# Patient Record
Sex: FEMALE | Race: WHITE | NOT HISPANIC OR LATINO | Employment: FULL TIME | URBAN - METROPOLITAN AREA
[De-identification: names, ages, dates, MRNs, and addresses within clinical notes are randomized per-mention and may not be internally consistent; named-entity substitution may affect disease eponyms.]

---

## 2017-03-08 ENCOUNTER — OFFICE VISIT (OUTPATIENT)
Dept: FAMILY MEDICINE | Facility: CLINIC | Age: 32
End: 2017-03-08
Payer: COMMERCIAL

## 2017-03-08 VITALS
TEMPERATURE: 97.9 F | BODY MASS INDEX: 23.3 KG/M2 | OXYGEN SATURATION: 97 % | SYSTOLIC BLOOD PRESSURE: 104 MMHG | HEIGHT: 66 IN | WEIGHT: 145 LBS | DIASTOLIC BLOOD PRESSURE: 76 MMHG | HEART RATE: 70 BPM | RESPIRATION RATE: 16 BRPM

## 2017-03-08 DIAGNOSIS — D22.9 ATYPICAL NEVUS: Primary | ICD-10-CM

## 2017-03-08 PROBLEM — Z13.6 CARDIOVASCULAR SCREENING; LDL GOAL LESS THAN 160: Status: ACTIVE | Noted: 2017-03-08

## 2017-03-08 PROCEDURE — 99203 OFFICE O/P NEW LOW 30 MIN: CPT | Performed by: NURSE PRACTITIONER

## 2017-03-08 NOTE — PROGRESS NOTES
SUBJECTIVE:                                                    Kathya Ac is a 31 year old female who presents to clinic today for the following health issues:    Infected Mole      Duration: notice it x 1.5 month    Description (location/character/radiation): a hard dark-black mole exploded with a brown color discharge under left breast.      Accompanying signs and symptoms: none    History (similar episodes/previous evaluation): None    Therapies tried and outcome: None     Had a small pinpoint mole for years, got scratched then infected.  Erupted with purulent drainage 2weeks ago, now chronically irritated.  2 close family members with unknown skin cancer in their 40's.  Would like this removed.      Problem list and histories reviewed & adjusted, as indicated.  Additional history: as documented    There is no problem list on file for this patient.    History reviewed. No pertinent past surgical history.    Social History   Substance Use Topics     Smoking status: Never Smoker     Smokeless tobacco: Not on file     Alcohol use No     Family History   Problem Relation Age of Onset     Family History Negative Mother      Chronic Obstructive Pulmonary Disease Father      Hypertension Father      Alzheimer Disease Maternal Grandmother      Colon Cancer Maternal Grandfather          No current outpatient prescriptions on file.     No Known Allergies    Reviewed and updated as needed this visit by clinical staff  Tobacco  Allergies  Meds  Med Hx  Surg Hx  Fam Hx  Soc Hx      Reviewed and updated as needed this visit by Provider  Tobacco  Allergies  Meds  Problems  Med Hx  Surg Hx  Fam Hx  Soc Hx          ROS:  Constitutional, HEENT, cardiovascular, pulmonary, gi and gu systems are negative, except as otherwise noted.    OBJECTIVE:                                                    /76 (BP Location: Left arm, Patient Position: Chair, Cuff Size: Adult Regular)  Pulse 70  Temp 97.9  F (36.6  C)  "(Oral)  Resp 16  Ht 5' 6.25\" (1.683 m)  Wt 145 lb (65.8 kg)  LMP  (LMP Unknown)  SpO2 97%  Breastfeeding? Yes  BMI 23.23 kg/m2  Body mass index is 23.23 kg/(m^2).  GENERAL: healthy, alert and no distress  RESP: lungs clear to auscultation - no rales, rhonchi or wheezes  CV: regular rate and rhythm, normal S1 S2, no S3 or S4, no murmur, click or rub, no peripheral edema and peripheral pulses strong  MS: no gross musculoskeletal defects noted, no edema  SKIN: 4mm bicolored brown atyp nevus with excoriation on left chest under breast          ASSESSMENT/PLAN:                                                        ICD-10-CM    1. 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    Will schedule procedure for 40 minutes.    See Patient Instructions    JNEIFER Stevens [facility]  "

## 2017-03-08 NOTE — NURSING NOTE
"Chief Complaint   Patient presents with     Mole       Initial /76 (BP Location: Left arm, Patient Position: Chair, Cuff Size: Adult Regular)  Pulse 70  Temp 97.9  F (36.6  C) (Oral)  Resp 16  Ht 5' 6.25\" (1.683 m)  Wt 145 lb (65.8 kg)  LMP  (LMP Unknown)  SpO2 97%  Breastfeeding? Yes  BMI 23.23 kg/m2 Estimated body mass index is 23.23 kg/(m^2) as calculated from the following:    Height as of this encounter: 5' 6.25\" (1.683 m).    Weight as of this encounter: 145 lb (65.8 kg).  Medication Reconciliation: complete     Zafar Parikh MA      "

## 2017-03-08 NOTE — Clinical Note
Please abstract the following data from this visit with this patient into the appropriate field in Epic:  Pap smear done on this date: 06/2016 (approximately), by this group: Samira Smiley, results were Normal.

## 2017-03-08 NOTE — MR AVS SNAPSHOT
"              After Visit Summary   3/8/2017    Kathya Ac    MRN: [id number]           Patient Information     Date Of Birth          1985        Visit Information        Provider Department      3/8/2017 12:00 PM Alondra Pham APRN CNP [facility]        Today's Diagnoses     Atypical nevus    -  1       Follow-ups after your visit        Your next 10 appointments already scheduled     Mar 15, 2017  8:20 AM CDT   Office Visit with JENIFER Stevens CNP   [facility] ([facility])    [address] [place] [id number] [phone]           Bring a current list of meds and any records pertaining to this visit.  For Physicals, please bring immunization records and any forms needing to be filled out.  Please arrive 10 minutes early to complete paperwork.              Who to contact     If you have questions or need follow up information about today's clinic visit or your schedule please contact [facility] directly at [phone].  Normal or non-critical lab and imaging results will be communicated to you by A4 Datahart, letter or phone within 4 business days after the clinic has received the results. If you do not hear from us within 7 days, please contact the clinic through [employer] or phone. If you have a critical or abnormal lab result, we will notify you by phone as soon as possible.  Submit refill requests through Cro[employer]Land or call your pharmacy and they will forward the refill request to us. Please allow 3 business days for your refill to be completed.          Additional Information About Your Visit        A4 DataharZivity Information     CroquetteLand lets you send messages to your doctor, view your test results, renew your prescriptions, schedule appointments and more. To sign up, go to [email or web address] . Click on \"Log in\" on the left side of the screen, which will take you to the Welcome page. Then click on \"Sign " "up Now\" on the right side of the page.     You will be asked to enter the access code listed below, as well as some personal information. Please follow the directions to create your username and password.     Your access code is: [email or web address]  Expires: 2017  1:19 PM     Your access code will  in 90 days. If you need help or a new code, please call your [facility] or [phone].        Care EveryWhere ID     This is your Care EveryWhere ID. This could be used by other organizations to access your [place] medical records  [id number]        Your Vitals Were     Pulse Temperature Respirations Height Last Period Pulse Oximetry    70 97.9  F (36.6  C) (Oral) 16 5' 6.25\" (1.683 m) (LMP Unknown) 97%    Breastfeeding? BMI (Body Mass Index)                Yes 23.23 kg/m2           Blood Pressure from Last 3 Encounters:   17 104/76    Weight from Last 3 Encounters:   17 145 lb (65.8 kg)              Today, you had the following     No orders found for display       Primary Care Provider    None Specified       No primary provider on file.        Thank you!     Thank you for choosing [facility]  for your care. Our goal is always to provide you with excellent care. Hearing back from our patients is one way we can continue to improve our services. Please take a few minutes to complete the written survey that you may receive in the mail after your visit with us. Thank you!             Your Updated Medication List - Protect others around you: Learn how to safely use, store and throw away your medicines at www.disposemymeds.org.      Notice  As of 3/8/2017  1:19 PM    You have not been prescribed any medications.      "

## 2017-03-15 ENCOUNTER — OFFICE VISIT (OUTPATIENT)
Dept: FAMILY MEDICINE | Facility: CLINIC | Age: 32
End: 2017-03-15
Payer: COMMERCIAL

## 2017-03-15 VITALS
WEIGHT: 143.25 LBS | OXYGEN SATURATION: 99 % | DIASTOLIC BLOOD PRESSURE: 72 MMHG | HEART RATE: 63 BPM | BODY MASS INDEX: 23.02 KG/M2 | SYSTOLIC BLOOD PRESSURE: 107 MMHG | TEMPERATURE: 97.7 F | HEIGHT: 66 IN | RESPIRATION RATE: 16 BRPM

## 2017-03-15 DIAGNOSIS — D22.9 ATYPICAL NEVUS: Primary | ICD-10-CM

## 2017-03-15 DIAGNOSIS — L91.8 SKIN TAG: ICD-10-CM

## 2017-03-15 PROCEDURE — 11200 RMVL SKIN TAGS UP TO&INC 15: CPT | Performed by: NURSE PRACTITIONER

## 2017-03-15 PROCEDURE — 11300 SHAVE SKIN LESION 0.5 CM/<: CPT | Mod: 59 | Performed by: NURSE PRACTITIONER

## 2017-03-15 NOTE — MR AVS SNAPSHOT
"              After Visit Summary   3/15/2017    Kathya Ac    MRN: [id number]           Patient Information     Date Of Birth          1985        Visit Information        Provider Department      3/15/2017 8:20 AM Alondra Pham APRN CNP [facility]        Today's Diagnoses     Atypical nevus    -  1    Skin tag           Follow-ups after your visit        Who to contact     If you have questions or need follow up information about today's clinic visit or your schedule please contact [facility] directly at [phone].  Normal or non-critical lab and imaging results will be communicated to you by Mixgarhart, letter or phone within 4 business days after the clinic has received the results. If you do not hear from us within 7 days, please contact the clinic through Mixgarhart or phone. If you have a critical or abnormal lab result, we will notify you by phone as soon as possible.  Submit refill requests through [employer]edia or call your pharmacy and they will forward the refill request to us. Please allow 3 business days for your refill to be completed.          Additional Information About Your Visit        MyChart Information     Geofeedia lets you send messages to your doctor, view your test results, renew your prescriptions, schedule appointments and more. To sign up, go to [email or web address] . Click on \"Log in\" on the left side of the screen, which will take you to the Welcome page. Then click on \"Sign up Now\" on the right side of the page.     You will be asked to enter the access code listed below, as well as some personal information. Please follow the directions to create your username and password.     Your access code is: [email or web address]  Expires: 2017  2:19 PM     Your access code will  in 90 days. If you need help or a new code, please call your [facility] or [phone].        Care EveryWhere ID     This is your Care EveryWhere ID. This could " "be used by other organizations to access your [place] medical records  [id number]        Your Vitals Were     Pulse Temperature Respirations Height Last Period Pulse Oximetry    63 97.7  F (36.5  C) (Oral) 16 5' 6.25\" (1.683 m) (LMP Unknown) 99%    Breastfeeding? BMI (Body Mass Index)                No 22.95 kg/m2           Blood Pressure from Last 3 Encounters:   03/15/17 107/72   03/08/17 104/76    Weight from Last 3 Encounters:   03/15/17 143 lb 4 oz (65 kg)   03/08/17 145 lb (65.8 kg)              We Performed the Following     BIOPSY SKIN/SUBQ/MUC MEM, SINGLE LESION     REMOVAL OF SKIN TAGS, FIRST 15        Primary Care Provider    None Specified       No primary provider on file.        Thank you!     Thank you for choosing [facility]  for your care. Our goal is always to provide you with excellent care. Hearing back from our patients is one way we can continue to improve our services. Please take a few minutes to complete the written survey that you may receive in the mail after your visit with us. Thank you!             Your Updated Medication List - Protect others around you: Learn how to safely use, store and throw away your medicines at www.disposemymeds.org.      Notice  As of 3/15/2017 11:59 PM    You have not been prescribed any medications.      "

## 2017-03-15 NOTE — PROGRESS NOTES
"  SUBJECTIVE:                                                    Kathya Ac is a 31 year old female who presents to clinic today for the following health issues:    1. Mole/Skin tag removal     S:  Kathya Ac is a.age female  who presents for is here for evaluation of nevus on her midline chest over her sternum. It has been present for 1 years and there has been a change in its appearance was pale brown now black.  Irritation has been present.    No surface changes such as bleeding crusting scaling or ulceration present. Pruritis, tenderness or pain have not been present.  No appearance of red white or blue color.    2 skin tags, left axilla and right neck.  Both approx 1 mm at the base.  Flesh toned.      No previous history of malignant skin lesions.  Past history of sun exposure.  Family history is Negative for skin malignancy.    O:  /72 (BP Location: Left arm, Patient Position: Chair, Cuff Size: Adult Regular)  Pulse 63  Temp 97.7  F (36.5  C) (Oral)  Resp 16  Ht 5' 6.25\" (1.683 m)  Wt 143 lb 4 oz (65 kg)  LMP  (LMP Unknown)  SpO2 99%  Breastfeeding? No  BMI 22.95 kg/m2    Medial chest wall Nevus is symmetric approximately 2mm in size, with regular borders and uniform black color.    2 skin tagsleft axilla and right neck, both 1mm at the base flesh toned.      suspicious    Procedure:  After informed consent was obtained from the patient, xylocaine 2% jelly was applied to the nevus on medial chest.  Area was then swabbed with betadine prep and infiltrated with 1% lidocaine with epinephrine. A shave biopsy was used to completely excise the lesion. Specimen was NOT submitted to pathology. Patient tolerated the procedure well. EBL minimal.  SKIN TA Small brown/skin colored excrescences attached by short broad to narrow stalk(s) consistent with benign skin tags    Instructed on skin care and sun avoidance.    Alondra Pham RN, CNP    "

## 2017-03-15 NOTE — NURSING NOTE
"Chief Complaint   Patient presents with     Lesion Removal       Initial /72 (BP Location: Left arm, Patient Position: Chair, Cuff Size: Adult Regular)  Pulse 63  Temp 97.7  F (36.5  C) (Oral)  Resp 16  Ht 5' 6.25\" (1.683 m)  Wt 143 lb 4 oz (65 kg)  LMP  (LMP Unknown)  SpO2 99%  Breastfeeding? No  BMI 22.95 kg/m2 Estimated body mass index is 22.95 kg/(m^2) as calculated from the following:    Height as of this encounter: 5' 6.25\" (1.683 m).    Weight as of this encounter: 143 lb 4 oz (65 kg).  Medication Reconciliation: complete     Zafar Parikh MA      "

## 2017-04-10 ENCOUNTER — TELEPHONE (OUTPATIENT)
Dept: FAMILY MEDICINE | Facility: CLINIC | Age: 32
End: 2017-04-10

## 2017-04-10 NOTE — TELEPHONE ENCOUNTER
Pt called in c/o 5 days of rt sided flank pain that comes and goes. Pain 8/10 when it hits-   Denies hematuria, fever, or chills- but she does get the shakes when the pain hits.  Also c/o epigastric pain that worsens with eating  Taking pepto-simethicone and miralax  Discussed poss gastritis and kidney stone-unsure how related as pt denies use of nsaids  Pt unable to come in today d/t meeting at work at 5  She will go to the [facility] as soon as she is done at work  Hours and address given to [facility]  Call back if worsening- in the meantime- go with clear liquids and try some maalox liquid for tummy. Avoid caffeine, solids or spicy.    Nirmala Guevara RN

## 2017-04-15 ENCOUNTER — OFFICE VISIT (OUTPATIENT)
Dept: URGENT CARE | Facility: URGENT CARE | Age: 32
End: 2017-04-15
Payer: COMMERCIAL

## 2017-04-15 VITALS
OXYGEN SATURATION: 99 % | HEART RATE: 96 BPM | DIASTOLIC BLOOD PRESSURE: 70 MMHG | WEIGHT: 138 LBS | TEMPERATURE: 98.2 F | SYSTOLIC BLOOD PRESSURE: 110 MMHG | BODY MASS INDEX: 22.11 KG/M2

## 2017-04-15 DIAGNOSIS — R10.13 ABDOMINAL PAIN, EPIGASTRIC: ICD-10-CM

## 2017-04-15 DIAGNOSIS — N10 ACUTE PYELONEPHRITIS: ICD-10-CM

## 2017-04-15 DIAGNOSIS — R10.13 DYSPEPSIA: Primary | ICD-10-CM

## 2017-04-15 LAB
ALBUMIN SERPL-MCNC: 4.1 G/DL (ref 3.4–5)
ALBUMIN UR-MCNC: NEGATIVE MG/DL
ALP SERPL-CCNC: 75 U/L (ref 40–150)
ALT SERPL W P-5'-P-CCNC: 15 U/L (ref 0–50)
ANION GAP SERPL CALCULATED.3IONS-SCNC: 10 MMOL/L (ref 3–14)
APPEARANCE UR: CLEAR
AST SERPL W P-5'-P-CCNC: 19 U/L (ref 0–45)
BACTERIA #/AREA URNS HPF: ABNORMAL /HPF
BASOPHILS # BLD AUTO: 0 10E9/L (ref 0–0.2)
BASOPHILS NFR BLD AUTO: 0.2 %
BILIRUB SERPL-MCNC: 0.8 MG/DL (ref 0.2–1.3)
BILIRUB UR QL STRIP: NEGATIVE
BUN SERPL-MCNC: 10 MG/DL (ref 7–30)
CALCIUM SERPL-MCNC: 9.7 MG/DL (ref 8.5–10.1)
CHLORIDE SERPL-SCNC: 100 MMOL/L (ref 94–109)
CO2 SERPL-SCNC: 29 MMOL/L (ref 20–32)
COLOR UR AUTO: YELLOW
CREAT SERPL-MCNC: 1.1 MG/DL (ref 0.52–1.04)
DIFFERENTIAL METHOD BLD: ABNORMAL
EOSINOPHIL # BLD AUTO: 1 10E9/L (ref 0–0.7)
EOSINOPHIL NFR BLD AUTO: 10.3 %
ERYTHROCYTE [DISTWIDTH] IN BLOOD BY AUTOMATED COUNT: 13.2 % (ref 10–15)
GFR SERPL CREATININE-BSD FRML MDRD: 59 ML/MIN/1.7M2
GLUCOSE SERPL-MCNC: 87 MG/DL (ref 70–99)
GLUCOSE UR STRIP-MCNC: NEGATIVE MG/DL
HCT VFR BLD AUTO: 47.6 % (ref 35–47)
HGB BLD-MCNC: 15.8 G/DL (ref 11.7–15.7)
HGB UR QL STRIP: NEGATIVE
KETONES UR STRIP-MCNC: ABNORMAL MG/DL
LEUKOCYTE ESTERASE UR QL STRIP: ABNORMAL
LYMPHOCYTES # BLD AUTO: 2.9 10E9/L (ref 0.8–5.3)
LYMPHOCYTES NFR BLD AUTO: 31.8 %
MCH RBC QN AUTO: 28.4 PG (ref 26.5–33)
MCHC RBC AUTO-ENTMCNC: 33.2 G/DL (ref 31.5–36.5)
MCV RBC AUTO: 86 FL (ref 78–100)
MONOCYTES # BLD AUTO: 0.6 10E9/L (ref 0–1.3)
MONOCYTES NFR BLD AUTO: 6.8 %
NEUTROPHILS # BLD AUTO: 4.7 10E9/L (ref 1.6–8.3)
NEUTROPHILS NFR BLD AUTO: 50.9 %
NITRATE UR QL: NEGATIVE
NON-SQ EPI CELLS #/AREA URNS LPF: ABNORMAL /LPF
PH UR STRIP: 8.5 PH (ref 5–7)
PLATELET # BLD AUTO: 310 10E9/L (ref 150–450)
POTASSIUM SERPL-SCNC: 4.2 MMOL/L (ref 3.4–5.3)
PROT SERPL-MCNC: 7.7 G/DL (ref 6.8–8.8)
RBC # BLD AUTO: 5.56 10E12/L (ref 3.8–5.2)
RBC #/AREA URNS AUTO: ABNORMAL /HPF (ref 0–2)
SODIUM SERPL-SCNC: 139 MMOL/L (ref 133–144)
SP GR UR STRIP: 1.02 (ref 1–1.03)
URN SPEC COLLECT METH UR: ABNORMAL
UROBILINOGEN UR STRIP-ACNC: 0.2 EU/DL (ref 0.2–1)
WBC # BLD AUTO: 9.3 10E9/L (ref 4–11)
WBC #/AREA URNS AUTO: ABNORMAL /HPF (ref 0–2)

## 2017-04-15 PROCEDURE — 81001 URINALYSIS AUTO W/SCOPE: CPT | Performed by: FAMILY MEDICINE

## 2017-04-15 PROCEDURE — 80053 COMPREHEN METABOLIC PANEL: CPT | Performed by: FAMILY MEDICINE

## 2017-04-15 PROCEDURE — 87338 HPYLORI STOOL AG IA: CPT | Performed by: FAMILY MEDICINE

## 2017-04-15 PROCEDURE — 85025 COMPLETE CBC W/AUTO DIFF WBC: CPT | Performed by: FAMILY MEDICINE

## 2017-04-15 PROCEDURE — 99214 OFFICE O/P EST MOD 30 MIN: CPT | Performed by: FAMILY MEDICINE

## 2017-04-15 PROCEDURE — 83690 ASSAY OF LIPASE: CPT | Performed by: FAMILY MEDICINE

## 2017-04-15 PROCEDURE — 36415 COLL VENOUS BLD VENIPUNCTURE: CPT | Performed by: FAMILY MEDICINE

## 2017-04-15 PROCEDURE — 82150 ASSAY OF AMYLASE: CPT | Performed by: FAMILY MEDICINE

## 2017-04-15 RX ORDER — SULFAMETHOXAZOLE/TRIMETHOPRIM 800-160 MG
1 TABLET ORAL 2 TIMES DAILY
Qty: 14 TABLET | Refills: 0 | Status: SHIPPED | OUTPATIENT
Start: 2017-04-15 | End: 2017-04-22

## 2017-04-15 NOTE — NURSING NOTE
"Kathya Ac is a 31 year old female.      Chief Complaint   Patient presents with     Urgent Care     Abdominal Pain     pt is here for abd pain that she has now had for 9 days - she has been taking Maalox and that is helping but is also giving her diarrhea   Pt is BREASTFEEDING and the baby is 7 months old.    Initial /70 (BP Location: Right arm, Patient Position: Chair, Cuff Size: Adult Regular)  Pulse 96  Temp 98.2  F (36.8  C) (Oral)  Wt 138 lb (62.6 kg)  SpO2 99%  BMI 22.11 kg/m2 Estimated body mass index is 22.11 kg/(m^2) as calculated from the following:    Height as of 3/15/17: 5' 6.25\" (1.683 m).    Weight as of this encounter: 138 lb (62.6 kg).  Medication Reconciliation: complete      Questioned patient about current smoking habits.  Pt. has never smoked.      Argenis Mireles CMA      "

## 2017-04-15 NOTE — PROGRESS NOTES
SUBJECTIVE  HPI:  Kathya Ac is a 31 year old female--she is breastfeeding-- who presents with the CC of 7-8 out of 10 sharp, stabbing off and on pain at the epigastric region of abdomen.  There has also been a constant sensation of gnawing at the painful area.     Pain is located in the epigastric area, with radiation to none.  However, patient has had off and on right flank pain for 15 days.  The pain at worst is a level 7-8 on a scale of 1-10.  Pain has been present for 9 days and is about the same.  EXACERBATING FACTORS: eating.    RELIEVING FACTORS: Maalox (partial relief).  ASSOCIATED SX: as listed above.   Patient has been eating bland foods without much relief. .    Past medical history:    No major medical problems.     No current outpatient prescriptions on file.     Social History   Substance Use Topics     Smoking status: Never Smoker     Smokeless tobacco: Not on file     Alcohol use No       ROS:  Review of systems negative except as stated above.    OBJECTIVE:  /70 (BP Location: Right arm, Patient Position: Chair, Cuff Size: Adult Regular)  Pulse 96  Temp 98.2  F (36.8  C) (Oral)  Wt 138 lb (62.6 kg)  SpO2 99%  BMI 22.11 kg/m2  GENERAL APPEARANCE: healthy, alert and no distress.  Patient is sitting comfortably.   EYES: Eyes grossly normal to inspection.  No obvious scleral icterus.   ABDOMEN: soft, normal bowel sounds, tenderness moderate (at the epigastrium), No hepatosplenomegaly.  No rebound/guarding/distension.   SKIN: no suspicious lesions or rashes  BACK;  No costovertebral angle pain with percussion.     LABS:   Results for orders placed or performed in visit on 04/15/17   *UA reflex to Microscopic and Culture ([place] [facility] (except [place] and [place])   Result Value Ref Range    Color Urine Yellow     Appearance Urine Clear     Glucose Urine Negative NEG mg/dL    Bilirubin Urine Negative NEG    Ketones Urine Trace (A) NEG mg/dL    Specific Gravity Urine 1.020  1.003 - 1.035    Blood Urine Negative NEG    pH Urine 8.5 (H) 5.0 - 7.0 pH    Protein Albumin Urine Negative NEG mg/dL    Urobilinogen Urine 0.2 0.2 - 1.0 EU/dL    Nitrite Urine Negative NEG    Leukocyte Esterase Urine Trace (A) NEG    Source Midstream Urine    CBC with platelets and differential   Result Value Ref Range    WBC 9.3 4.0 - 11.0 10e9/L    RBC Count 5.56 (H) 3.8 - 5.2 10e12/L    Hemoglobin 15.8 (H) 11.7 - 15.7 g/dL    Hematocrit 47.6 (H) 35.0 - 47.0 %    MCV 86 78 - 100 fl    MCH 28.4 26.5 - 33.0 pg    MCHC 33.2 31.5 - 36.5 g/dL    RDW 13.2 10.0 - 15.0 %    Platelet Count 310 150 - 450 10e9/L    Diff Method Automated Method     % Neutrophils 50.9 %    % Lymphocytes 31.8 %    % Monocytes 6.8 %    % Eosinophils 10.3 %    % Basophils 0.2 %    Absolute Neutrophil 4.7 1.6 - 8.3 10e9/L    Absolute Lymphocytes 2.9 0.8 - 5.3 10e9/L    Absolute Monocytes 0.6 0.0 - 1.3 10e9/L    Absolute Eosinophils 1.0 (H) 0.0 - 0.7 10e9/L    Absolute Basophils 0.0 0.0 - 0.2 10e9/L   Comprehensive metabolic panel   Result Value Ref Range    Sodium 139 133 - 144 mmol/L    Potassium 4.2 3.4 - 5.3 mmol/L    Chloride 100 94 - 109 mmol/L    Carbon Dioxide 29 20 - 32 mmol/L    Anion Gap 10 3 - 14 mmol/L    Glucose 87 70 - 99 mg/dL    Urea Nitrogen 10 7 - 30 mg/dL    Creatinine 1.10 (H) 0.52 - 1.04 mg/dL    GFR Estimate 59 (L) >60 mL/min/1.7m2    GFR Estimate If Black 71 >60 mL/min/1.7m2    Calcium 9.7 8.5 - 10.1 mg/dL    Bilirubin Total 0.8 0.2 - 1.3 mg/dL    Albumin 4.1 3.4 - 5.0 g/dL    Protein Total 7.7 6.8 - 8.8 g/dL    Alkaline Phosphatase 75 40 - 150 U/L    ALT 15 0 - 50 U/L    AST 19 0 - 45 U/L   Urine Microscopic   Result Value Ref Range    WBC Urine 2-5 (A) 0 - 2 /HPF    RBC Urine O - 2 0 - 2 /HPF    Squamous Epithelial /LPF Urine Few FEW /LPF    Bacteria Urine Few (A) NEG /HPF         ASSESSMENT:  Epigastric Pain  Dyspepsia  Right Flank Pain  Acute Pyelonephritis    PLAN:  Start Prilosec.  Continue eating a gentle  diet  follow up with the primary care provider in 7-10 days.   Rx:  Bactrim DS   See Orders in Epic  Pending labs:  H pylori Stool Antigen, Amylase, Lipase.   Go to the emergency room if there is worsening pain.     Flip Garland MD

## 2017-04-15 NOTE — PATIENT INSTRUCTIONS
follow up with primary care provider in 7-10 days.     Continue the gentle diet    Start Prilosec

## 2017-04-15 NOTE — MR AVS SNAPSHOT
"              After Visit Summary   4/15/2017    Kathya Ac    MRN: [id number]           Patient Information     Date Of Birth          1985        Visit Information        Provider Department      4/15/2017 2:40 PM Flip Garland MD [facility]        Today's Diagnoses     Dyspepsia    -  1    Abdominal pain, epigastric        Acute pyelonephritis          Care Instructions    follow up with primary care provider in 7-10 days.     Continue the gentle diet    Start Prilosec            Follow-ups after your visit        Who to contact     If you have questions or need follow up information about today's clinic visit or your schedule please contact [facility] directly at [phone].  Normal or non-critical lab and imaging results will be communicated to you by MyChart, letter or phone within 4 business days after the clinic has received the results. If you do not hear from us within 7 days, please contact the clinic through [employer] or phone. If you have a critical or abnormal lab result, we will notify you by phone as soon as possible.  Submit refill requests through Inf[employer]ami or call your pharmacy and they will forward the refill request to us. Please allow 3 business days for your refill to be completed.          Additional Information About Your Visit        MyChart Information     Infogami lets you send messages to your doctor, view your test results, renew your prescriptions, schedule appointments and more. To sign up, go to [email or web address] . Click on \"Log in\" on the left side of the screen, which will take you to the Welcome page. Then click on \"Sign up Now\" on the right side of the page.     You will be asked to enter the access code listed below, as well as some personal information. Please follow the directions to create your username and password.     Your access code is: [email or web address]  Expires: 2017  2:19 PM     Your access code will  in 90 days. If you " need help or a new code, please call your [place] clinic or [phone].        Care EveryWhere ID     This is your Care EveryWhere ID. This could be used by other organizations to access your [place] medical records  [id number]        Your Vitals Were     Pulse Temperature Pulse Oximetry BMI (Body Mass Index)          96 98.2  F (36.8  C) (Oral) 99% 22.11 kg/m2         Blood Pressure from Last 3 Encounters:   04/15/17 110/70   03/15/17 107/72   03/08/17 104/76    Weight from Last 3 Encounters:   04/15/17 138 lb (62.6 kg)   03/15/17 143 lb 4 oz (65 kg)   03/08/17 145 lb (65.8 kg)              We Performed the Following     *UA reflex to Microscopic and Culture ([place] and [facility] (except [place] and [place])     Amylase     CBC with platelets and differential     Comprehensive metabolic panel     H Pylori antigen, stool     Lipase     Urine Microscopic          Today's Medication Changes          These changes are accurate as of: 4/15/17  4:34 PM.  If you have any questions, ask your nurse or doctor.               Start taking these medicines.        Dose/Directions    sulfamethoxazole-trimethoprim 800-160 MG per tablet   Commonly known as:  BACTRIM DS/SEPTRA DS   Used for:  Acute pyelonephritis   Started by:  Flip Garland MD        Dose:  1 tablet   Take 1 tablet by mouth 2 times daily for 7 days   Quantity:  14 tablet   Refills:  0            Where to get your medicines      These medications were sent to [facility] [address] AT [facility] [address], [facility] [address]    Hours:  called pharm.  they do accept faxes Phone:  [phone]     sulfamethoxazole-trimethoprim 800-160 MG per tablet                Primary Care Provider    Fvl None       No address on file        Thank you!     Thank you for choosing [facility]  for your care. Our goal is always to provide you with excellent  care. Hearing back from our patients is one way we can continue to improve our services. Please take a few minutes to complete the written survey that you may receive in the mail after your visit with us. Thank you!             Your Updated Medication List - Protect others around you: Learn how to safely use, store and throw away your medicines at www.disposemymeds.org.          This list is accurate as of: 4/15/17  4:34 PM.  Always use your most recent med list.                   Brand Name Dispense Instructions for use    sulfamethoxazole-trimethoprim 800-160 MG per tablet    BACTRIM DS/SEPTRA DS    14 tablet    Take 1 tablet by mouth 2 times daily for 7 days

## 2017-04-16 ENCOUNTER — TELEPHONE (OUTPATIENT)
Dept: URGENT CARE | Facility: URGENT CARE | Age: 32
End: 2017-04-16

## 2017-04-16 LAB
AMYLASE SERPL-CCNC: 57 U/L (ref 30–110)
LIPASE SERPL-CCNC: 181 U/L (ref 73–393)

## 2017-04-16 NOTE — TELEPHONE ENCOUNTER
SUBJECTIVE:  The patient's April 15, 2017, Amylase was normal at 57 U/L (normal values are  U/L).      PLAN:  Please notify patient of this result.  Based on this lab, pancreatitis is less likely to be a cause of upper abdominal pain.      Flip Garland MD

## 2017-04-16 NOTE — TELEPHONE ENCOUNTER
[phone] ([place])   Called the patient and gave her the msg below - she will set up an appointment with a PMD to follow up on these issues.

## 2017-04-17 ENCOUNTER — TELEPHONE (OUTPATIENT)
Dept: FAMILY MEDICINE | Facility: CLINIC | Age: 32
End: 2017-04-17

## 2017-04-17 ENCOUNTER — HOSPITAL ENCOUNTER (EMERGENCY)
Facility: CLINIC | Age: 32
Discharge: HOME | End: 2017-04-17
Attending: EMERGENCY MEDICINE | Admitting: EMERGENCY MEDICINE
Payer: COMMERCIAL

## 2017-04-17 VITALS
BODY MASS INDEX: 22.18 KG/M2 | HEART RATE: 94 BPM | TEMPERATURE: 97.9 F | HEIGHT: 66 IN | OXYGEN SATURATION: 99 % | WEIGHT: 138 LBS | SYSTOLIC BLOOD PRESSURE: 119 MMHG | DIASTOLIC BLOOD PRESSURE: 82 MMHG | RESPIRATION RATE: 18 BRPM

## 2017-04-17 DIAGNOSIS — K29.00 ACUTE GASTRITIS WITHOUT HEMORRHAGE, UNSPECIFIED GASTRITIS TYPE: ICD-10-CM

## 2017-04-17 LAB
ALBUMIN SERPL-MCNC: 4.1 G/DL (ref 3.4–5)
ALBUMIN UR-MCNC: NEGATIVE MG/DL
ALP SERPL-CCNC: 93 U/L (ref 40–150)
ALT SERPL W P-5'-P-CCNC: 12 U/L (ref 0–50)
ANION GAP SERPL CALCULATED.3IONS-SCNC: 8 MMOL/L (ref 3–14)
APPEARANCE UR: CLEAR
AST SERPL W P-5'-P-CCNC: 5 U/L (ref 0–45)
BASOPHILS # BLD AUTO: 0 10E9/L (ref 0–0.2)
BASOPHILS NFR BLD AUTO: 0.3 %
BILIRUB SERPL-MCNC: 0.5 MG/DL (ref 0.2–1.3)
BILIRUB UR QL STRIP: NEGATIVE
BUN SERPL-MCNC: 11 MG/DL (ref 7–30)
CALCIUM SERPL-MCNC: 8.8 MG/DL (ref 8.5–10.1)
CHLORIDE SERPL-SCNC: 105 MMOL/L (ref 94–109)
CO2 SERPL-SCNC: 27 MMOL/L (ref 20–32)
COLOR UR AUTO: YELLOW
CREAT SERPL-MCNC: 0.97 MG/DL (ref 0.52–1.04)
DIFFERENTIAL METHOD BLD: ABNORMAL
EOSINOPHIL # BLD AUTO: 0.6 10E9/L (ref 0–0.7)
EOSINOPHIL NFR BLD AUTO: 7.4 %
ERYTHROCYTE [DISTWIDTH] IN BLOOD BY AUTOMATED COUNT: 13.2 % (ref 10–15)
GFR SERPL CREATININE-BSD FRML MDRD: 68 ML/MIN/1.7M2
GLUCOSE SERPL-MCNC: 74 MG/DL (ref 70–99)
GLUCOSE UR STRIP-MCNC: NEGATIVE MG/DL
H PYLORI AG STL QL IA: NORMAL
HCG UR QL: NEGATIVE
HCT VFR BLD AUTO: 45.1 % (ref 35–47)
HGB BLD-MCNC: 15.2 G/DL (ref 11.7–15.7)
HGB UR QL STRIP: NEGATIVE
IMM GRANULOCYTES # BLD: 0 10E9/L (ref 0–0.4)
IMM GRANULOCYTES NFR BLD: 0.1 %
INTERNAL QC OK POCT: YES
KETONES UR STRIP-MCNC: NEGATIVE MG/DL
LEUKOCYTE ESTERASE UR QL STRIP: NEGATIVE
LIPASE SERPL-CCNC: 105 U/L (ref 73–393)
LYMPHOCYTES # BLD AUTO: 2.9 10E9/L (ref 0.8–5.3)
LYMPHOCYTES NFR BLD AUTO: 39.5 %
MCH RBC QN AUTO: 29 PG (ref 26.5–33)
MCHC RBC AUTO-ENTMCNC: 33.7 G/DL (ref 31.5–36.5)
MCV RBC AUTO: 86 FL (ref 78–100)
MICRO REPORT STATUS: NORMAL
MONOCYTES # BLD AUTO: 0.3 10E9/L (ref 0–1.3)
MONOCYTES NFR BLD AUTO: 3.7 %
MUCOUS THREADS #/AREA URNS LPF: PRESENT /LPF
NEUTROPHILS # BLD AUTO: 3.6 10E9/L (ref 1.6–8.3)
NEUTROPHILS NFR BLD AUTO: 49 %
NITRATE UR QL: NEGATIVE
NRBC # BLD AUTO: 0 10*3/UL
NRBC BLD AUTO-RTO: 0 /100
PH UR STRIP: 7 PH (ref 5–7)
PLATELET # BLD AUTO: 263 10E9/L (ref 150–450)
POTASSIUM SERPL-SCNC: 4.3 MMOL/L (ref 3.4–5.3)
PROT SERPL-MCNC: 7.8 G/DL (ref 6.8–8.8)
RBC # BLD AUTO: 5.25 10E12/L (ref 3.8–5.2)
RBC #/AREA URNS AUTO: 3 /HPF (ref 0–2)
SODIUM SERPL-SCNC: 140 MMOL/L (ref 133–144)
SP GR UR STRIP: 1.02 (ref 1–1.03)
SPECIMEN SOURCE: NORMAL
SQUAMOUS #/AREA URNS AUTO: <1 /HPF (ref 0–1)
URN SPEC COLLECT METH UR: ABNORMAL
UROBILINOGEN UR STRIP-MCNC: NORMAL MG/DL (ref 0–2)
WBC # BLD AUTO: 7.4 10E9/L (ref 4–11)
WBC #/AREA URNS AUTO: <1 /HPF (ref 0–2)

## 2017-04-17 PROCEDURE — 25000128 H RX IP 250 OP 636: Performed by: EMERGENCY MEDICINE

## 2017-04-17 PROCEDURE — 99284 EMERGENCY DEPT VISIT MOD MDM: CPT | Mod: Z6 | Performed by: EMERGENCY MEDICINE

## 2017-04-17 PROCEDURE — 96360 HYDRATION IV INFUSION INIT: CPT | Performed by: EMERGENCY MEDICINE

## 2017-04-17 PROCEDURE — 85025 COMPLETE CBC W/AUTO DIFF WBC: CPT | Performed by: EMERGENCY MEDICINE

## 2017-04-17 PROCEDURE — 83690 ASSAY OF LIPASE: CPT | Performed by: EMERGENCY MEDICINE

## 2017-04-17 PROCEDURE — 25000132 ZZH RX MED GY IP 250 OP 250 PS 637: Performed by: EMERGENCY MEDICINE

## 2017-04-17 PROCEDURE — 81001 URINALYSIS AUTO W/SCOPE: CPT | Performed by: EMERGENCY MEDICINE

## 2017-04-17 PROCEDURE — 25000125 ZZHC RX 250: Performed by: EMERGENCY MEDICINE

## 2017-04-17 PROCEDURE — 80053 COMPREHEN METABOLIC PANEL: CPT | Performed by: EMERGENCY MEDICINE

## 2017-04-17 PROCEDURE — 99283 EMERGENCY DEPT VISIT LOW MDM: CPT | Mod: 25 | Performed by: EMERGENCY MEDICINE

## 2017-04-17 PROCEDURE — 81025 URINE PREGNANCY TEST: CPT | Performed by: EMERGENCY MEDICINE

## 2017-04-17 RX ORDER — SUCRALFATE 1 G/1
1 TABLET ORAL
Qty: 28 TABLET | Refills: 0 | Status: SHIPPED | OUTPATIENT
Start: 2017-04-17 | End: 2017-04-24

## 2017-04-17 RX ORDER — SUCRALFATE ORAL 1 G/10ML
1 SUSPENSION ORAL ONCE
Status: COMPLETED | OUTPATIENT
Start: 2017-04-17 | End: 2017-04-17

## 2017-04-17 RX ADMIN — LIDOCAINE HYDROCHLORIDE 30 ML: 20 SOLUTION ORAL; TOPICAL at 12:32

## 2017-04-17 RX ADMIN — SUCRALFATE 1 G: 1 SUSPENSION ORAL at 14:29

## 2017-04-17 RX ADMIN — SODIUM CHLORIDE 1000 ML: 9 INJECTION, SOLUTION INTRAVENOUS at 13:43

## 2017-04-17 ASSESSMENT — ENCOUNTER SYMPTOMS
ABDOMINAL PAIN: 1
COUGH: 0
BLOOD IN STOOL: 0
FEVER: 0
ANAL BLEEDING: 0
SHORTNESS OF BREATH: 0

## 2017-04-17 NOTE — ED AVS SNAPSHOT
[facility], Emergency Department    [address] [address]    Phone:  [phone]                                       Kathya Ac   MRN: [id number]    Department:  [facility], Emergency Department   Date of Visit:  4/17/2017           Patient Information     Date Of Birth          1985        Your diagnoses for this visit were:     Acute gastritis without hemorrhage, unspecified gastritis type        You were seen by Samy Tran MD.        Discharge Instructions       Please make an appointment to follow up with Your Primary Care Provider in 1-2 weeks for recheck.    Continue your Prilosec and other medications as directed.    Discharge References/Attachments     GASTRITIS (ADULT) (ENGLISH)      24 Hour Appointment Hotline       To make an appointment at any [facility] clinic, call [phone] ([phone]). If you don't have a family doctor or clinic, we will help you find one. [facility] clinics are conveniently located to serve the needs of you and your family.             Review of your medicines      START taking        Dose / Directions Last dose taken    sucralfate 1 GM tablet   Commonly known as:  CARAFATE   Dose:  1 g   Quantity:  28 tablet        Take 1 tablet (1 g) by mouth 4 times daily (before meals and nightly) for 7 days   Refills:  0          Our records show that you are taking the medicines listed below. If these are incorrect, please call your family doctor or clinic.        Dose / Directions Last dose taken    sulfamethoxazole-trimethoprim 800-160 MG per tablet   Commonly known as:  BACTRIM DS/SEPTRA DS   Dose:  1 tablet   Quantity:  14 tablet        Take 1 tablet by mouth 2 times daily for 7 days   Refills:  0                Prescriptions were sent or printed at these locations (1 Prescription)                   [facility] [address] [place] [address] - [address] AT [facility] [address],  "[place] [facility] [address]    Telephone:  [phone]   Fax:  [phone]   Hours:  called pharm.  they do accept faxes                E-Prescribed (1 of 1)         sucralfate (CARAFATE) 1 GM tablet                Procedures and tests performed during your visit     CBC with platelets differential    Comprehensive metabolic panel    Lipase    Peripheral IV: Standard    UA with Microscopic reflex to Culture    hCG qual urine POCT      Orders Needing Specimen Collection     None      Pending Results     No orders found from 4/15/2017 to 2017.            Pending Culture Results     No orders found from 4/15/2017 to 2017.            Thank you for choosing [facility]       Thank you for choosing [facility] for your care. Our goal is always to provide you with excellent care. Hearing back from our patients is one way we can continue to improve our services. Please take a few minutes to complete the written survey that you may receive in the mail after you visit with us. Thank you!        [employer] Information     [employer] lets you send messages to your doctor, view your test results, renew your prescriptions, schedule appointments and more. To sign up, go to [email or web address] . Click on \"Log in\" on the left side of the screen, which will take you to the Welcome page. Then click on \"Sign up Now\" on the right side of the page.     You will be asked to enter the access code listed below, as well as some personal information. Please follow the directions to create your username and password.     Your access code is: [email or web address]  Expires: 2017  2:19 PM     Your access code will  in 90 days. If you need help or a new code, please call your [facility] clinic or [phone].        Care EveryWhere ID     This is your Care EveryWhere ID. This could be used by other organizations to access your [facility] medical records  [id number]        After Visit Summary       This is your record. Keep this with you and " show to your community pharmacist(s) and doctor(s) at your next visit.

## 2017-04-17 NOTE — TELEPHONE ENCOUNTER
"Pt was seen in UC 4/15 and dx'd with acute pyelonephritis..Also started on omeprazole for stomach pain.     \"Pain pretty severe, yesterday I almost went to the ED\"    She has abd pain - can't eat anything - feels \"right underneath stomach\". Maalox not helping. Rates pain at 8-9 on 1-10 scale last night. . Pain now at 4. If eats pain worsens . Has had pain for 10 days.   Also right sided flank pain - rates this pain as 6 on 1-10 scale. Nausea started yesterday. Feels bloated.     She is drinking fluids. Urination is normal but orange in color    Advised to ED for pain with dx of pyelonephritis. She agrees.     Nidia Ac, RN, BSN       "

## 2017-04-17 NOTE — ED NOTES
Pt reports 10+ days intermitent acute/sharp epigastric pain, that is much worse w/ any PO intake. + pt was diagnosed w/ pylonephritis Saturday and started on Bactrim. + continued R flank pain. Denies urinary complaints.

## 2017-04-17 NOTE — ED AVS SNAPSHOT
[facility], [place], Emergency Department    [address] [place] [address]    Phone:  [phone]                                       Kathya Ac   MRN: [id number]    Department:  [facility], Emergency Department   Date of Visit:  4/17/2017           After Visit Summary Signature Page     I have received my discharge instructions, and my questions have been answered. I have discussed any challenges I see with this plan with the nurse or doctor.    ..........................................................................................................................................  Patient/Patient Representative Signature      ..........................................................................................................................................  Patient Representative Print Name and Relationship to Patient    ..................................................               ................................................  Date                                            Time    ..........................................................................................................................................  Reviewed by Signature/Title    ...................................................              ..............................................  Date                                                            Time

## 2017-04-17 NOTE — ED PROVIDER NOTES
History     Chief Complaint   Patient presents with     Abdominal Pain     Pt reports 10+ days intermitent acute/sharp epigastric pain, that is much worse w/ any PO intake. + pt was diagnosed w/ pylonephritis Saturday and started on Bactrim. + continued R flank pain. Denies urinary complaints.      HPI  Kathya Ac is a 31 year old female who presents to the ER with complaints of persistent epigastric and right flank pain. Patient states that her symptoms started approximately 10 days ago and states that she had been taking some Mylanta initially with some relief of her epigastric distress. Patient states, however, that this did not continue to work and she wound up seeing an urgent care center who tested her for a number of different things including H pylori. They finally diagnosed her with a pyelonephritis and placed her on Prilosec. The patient states that she has not been getting better over the last 2 days since starting the medications and presents here to the ER for evaluation. Patient states that she wound up lying in a fetal position for 2 hours because of her epigastric and substernal pain. She denies any coughing or shortness of breath. She denies any melena or bright blood per rectum. Patient denies any fevers.     I have reviewed the Medications, Allergies, Past Medical and Surgical History, and Social History in the Epic system.    History reviewed. No pertinent past medical history.    History reviewed. No pertinent surgical history.    Family History   Problem Relation Age of Onset     Family History Negative Mother      Chronic Obstructive Pulmonary Disease Father      Hypertension Father      Alzheimer Disease Maternal Grandmother      Colon Cancer Maternal Grandfather        Social History   Substance Use Topics     Smoking status: Never Smoker     Smokeless tobacco: Not on file     Alcohol use No     Previous Medications    SULFAMETHOXAZOLE-TRIMETHOPRIM (BACTRIM DS/SEPTRA DS) 800-160 MG PER  "TABLET    Take 1 tablet by mouth 2 times daily for 7 days   Prilosec     No Known Allergies    Review of Systems   Constitutional: Negative for fever.   Respiratory: Negative for cough and shortness of breath.    Gastrointestinal: Positive for abdominal pain (epigastric). Negative for anal bleeding and blood in stool.   All other systems reviewed and are negative.      Physical Exam   BP: 98/77  Pulse: 94  Temp: 97.3  F (36.3  C)  Resp: 16  Height: 167.6 cm (5' 6\")  Weight: 62.6 kg (138 lb)  SpO2: 95 %  Physical Exam   Constitutional: She is oriented to person, place, and time. She appears well-nourished. No distress.   HENT:   Head: Atraumatic.   Eyes: EOM are normal. Pupils are equal, round, and reactive to light.   Neck: Neck supple.   Cardiovascular: Normal heart sounds.    Pulmonary/Chest: Breath sounds normal.   Abdominal: Soft. There is tenderness (epigastrium). There is no rebound and no guarding.   Musculoskeletal: She exhibits no edema or tenderness.   Neurological: She is alert and oriented to person, place, and time. No cranial nerve deficit.   Skin: No rash noted.   Psychiatric: She has a normal mood and affect.       ED Course     ED Course     Procedures        Results for orders placed or performed during the hospital encounter of 04/17/17   CBC with platelets differential   Result Value Ref Range    WBC 7.4 4.0 - 11.0 10e9/L    RBC Count 5.25 (H) 3.8 - 5.2 10e12/L    Hemoglobin 15.2 11.7 - 15.7 g/dL    Hematocrit 45.1 35.0 - 47.0 %    MCV 86 78 - 100 fl    MCH 29.0 26.5 - 33.0 pg    MCHC 33.7 31.5 - 36.5 g/dL    RDW 13.2 10.0 - 15.0 %    Platelet Count 263 150 - 450 10e9/L    Diff Method Automated Method     % Neutrophils 49.0 %    % Lymphocytes 39.5 %    % Monocytes 3.7 %    % Eosinophils 7.4 %    % Basophils 0.3 %    % Immature Granulocytes 0.1 %    Nucleated RBCs 0 0 /100    Absolute Neutrophil 3.6 1.6 - 8.3 10e9/L    Absolute Lymphocytes 2.9 0.8 - 5.3 10e9/L    Absolute Monocytes 0.3 0.0 - 1.3 " 10e9/L    Absolute Eosinophils 0.6 0.0 - 0.7 10e9/L    Absolute Basophils 0.0 0.0 - 0.2 10e9/L    Abs Immature Granulocytes 0.0 0 - 0.4 10e9/L    Absolute Nucleated RBC 0.0    Comprehensive metabolic panel   Result Value Ref Range    Sodium 140 133 - 144 mmol/L    Potassium 4.3 3.4 - 5.3 mmol/L    Chloride 105 94 - 109 mmol/L    Carbon Dioxide 27 20 - 32 mmol/L    Anion Gap 8 3 - 14 mmol/L    Glucose 74 70 - 99 mg/dL    Urea Nitrogen 11 7 - 30 mg/dL    Creatinine 0.97 0.52 - 1.04 mg/dL    GFR Estimate 68 >60 mL/min/1.7m2    GFR Estimate If Black 82 >60 mL/min/1.7m2    Calcium 8.8 8.5 - 10.1 mg/dL    Bilirubin Total 0.5 0.2 - 1.3 mg/dL    Albumin 4.1 3.4 - 5.0 g/dL    Protein Total 7.8 6.8 - 8.8 g/dL    Alkaline Phosphatase 93 40 - 150 U/L    ALT 12 0 - 50 U/L    AST 5 0 - 45 U/L   Lipase   Result Value Ref Range    Lipase 105 73 - 393 U/L   UA with Microscopic reflex to Culture   Result Value Ref Range    Color Urine Yellow     Appearance Urine Clear     Glucose Urine Negative NEG mg/dL    Bilirubin Urine Negative NEG    Ketones Urine Negative NEG mg/dL    Specific Gravity Urine 1.016 1.003 - 1.035    Blood Urine Negative NEG    pH Urine 7.0 5.0 - 7.0 pH    Protein Albumin Urine Negative NEG mg/dL    Urobilinogen mg/dL Normal 0.0 - 2.0 mg/dL    Nitrite Urine Negative NEG    Leukocyte Esterase Urine Negative NEG    Source Catheterized Urine     WBC Urine <1 0 - 2 /HPF    RBC Urine 3 (H) 0 - 2 /HPF    Squamous Epithelial /HPF Urine <1 0 - 1 /HPF    Mucous Urine Present (A) NEG /LPF   hCG qual urine POCT   Result Value Ref Range    HCG Qual Urine Negative neg    Internal QC OK Yes        Labs Ordered and Resulted from Time of ED Arrival Up to the Time of Departure from the ED   CBC WITH PLATELETS DIFFERENTIAL - Abnormal; Notable for the following:        Result Value    RBC Count 5.25 (*)     All other components within normal limits   ROUTINE UA WITH MICROSCOPIC REFLEX TO CULTURE - Abnormal; Notable for the following:      RBC Urine 3 (*)     Mucous Urine Present (*)     All other components within normal limits   HCG QUAL URINE POCT - Normal   COMPREHENSIVE METABOLIC PANEL   LIPASE   PERIPHERAL IV CATHETER       Assessments & Plan (with Medical Decision Making)     I have reviewed the nursing notes.    Medications   sodium chloride (PF) 0.9% PF flush 3 mL (not administered)   sodium chloride (PF) 0.9% PF flush 3 mL (3 mLs Intracatheter Given 4/17/17 1344)   lidocaine (XYLOCAINE) 2 % 15 mL, alum & mag hydroxide-simethicone (MYLANTA ES/MAALOX  ES) 15 mL GI Cocktail (30 mLs Oral Given 4/17/17 1232)   sucralfate (CARAFATE) suspension 1 g (1 g Oral Given 4/17/17 1429)   0.9% sodium chloride BOLUS (0 mLs Intravenous Stopped 4/17/17 1429)       GI cocktail did improve her pain from a 4 to a 2 on a 1-10 scale.    There was a mistake between the lab and the ER and patient's lab results were delayed.  Patient left the ER prior to discharge.  I did call the patient to inform her of her lab results and to escribe a prescription in for her.    I have reviewed the findings, diagnosis, plan and need for follow up with the patient.    New Prescriptions    SUCRALFATE (CARAFATE) 1 GM TABLET    Take 1 tablet (1 g) by mouth 4 times daily (before meals and nightly) for 7 days    E-scribed to her local pharmacy    Final diagnoses:   Acute gastritis without hemorrhage, unspecified gastritis type     Please make an appointment to follow up with Your Primary Care Provider in 1-2 weeks for recheck.    Continue your Prilosec and other medications as directed.      Samy Tran MD    IPamela, am serving as a trained medical scribe to document services personally performed by Samy Tran MD, based on the provider's statements to me.      ISamy MD, was physically present and have reviewed and verified the accuracy of this note documented by Pamela Pickard.       4/17/2017   [facility], EMERGENCY DEPARTMENT      Samy Tran MD  04/17/17 8439

## 2017-04-17 NOTE — DISCHARGE INSTRUCTIONS
Please make an appointment to follow up with Your Primary Care Provider in 1-2 weeks for recheck.    Continue your Prilosec and other medications as directed.

## 2017-04-19 ENCOUNTER — OFFICE VISIT (OUTPATIENT)
Dept: FAMILY MEDICINE | Facility: CLINIC | Age: 32
End: 2017-04-19
Payer: COMMERCIAL

## 2017-04-19 VITALS
OXYGEN SATURATION: 98 % | WEIGHT: 135 LBS | BODY MASS INDEX: 21.79 KG/M2 | TEMPERATURE: 97.9 F | DIASTOLIC BLOOD PRESSURE: 66 MMHG | SYSTOLIC BLOOD PRESSURE: 110 MMHG | HEART RATE: 96 BPM

## 2017-04-19 DIAGNOSIS — K29.00 ACUTE GASTRITIS, PRESENCE OF BLEEDING UNSPECIFIED, UNSPECIFIED GASTRITIS TYPE: Primary | ICD-10-CM

## 2017-04-19 DIAGNOSIS — R10.11 RUQ ABDOMINAL PAIN: ICD-10-CM

## 2017-04-19 PROCEDURE — 99214 OFFICE O/P EST MOD 30 MIN: CPT | Performed by: FAMILY MEDICINE

## 2017-04-19 RX ORDER — DIPHENHYDRAMINE HYDROCHLORIDE AND LIDOCAINE HYDROCHLORIDE AND ALUMINUM HYDROXIDE AND MAGNESIUM HYDRO
5-10 KIT 3 TIMES DAILY PRN
Qty: 237 ML | Refills: 0 | Status: SHIPPED | OUTPATIENT
Start: 2017-04-19 | End: 2018-03-09

## 2017-04-19 RX ORDER — OMEPRAZOLE 40 MG/1
40 CAPSULE, DELAYED RELEASE ORAL
Qty: 60 CAPSULE | Refills: 1 | Status: SHIPPED | OUTPATIENT
Start: 2017-04-19 | End: 2017-04-20

## 2017-04-19 ASSESSMENT — ENCOUNTER SYMPTOMS
SORE THROAT: 0
BLOOD IN STOOL: 0
DIAPHORESIS: 0
FEVER: 0
CHILLS: 0
HEARTBURN: 1
NAUSEA: 1
VOMITING: 1
DYSURIA: 0
FLANK PAIN: 1
HEMATURIA: 0
COUGH: 0
DIARRHEA: 0
CONSTIPATION: 0
ABDOMINAL PAIN: 1
WEIGHT LOSS: 1
FREQUENCY: 0

## 2017-04-19 NOTE — NURSING NOTE
"Chief Complaint   Patient presents with     Abdominal Pain     follow-up       Initial /66 (BP Location: Right arm, Patient Position: Chair, Cuff Size: Adult Regular)  Pulse 96  Temp 97.9  F (36.6  C) (Oral)  Wt 135 lb (61.2 kg)  SpO2 98%  BMI 21.79 kg/m2 Estimated body mass index is 21.79 kg/(m^2) as calculated from the following:    Height as of 4/17/17: 5' 6\" (1.676 m).    Weight as of this encounter: 135 lb (61.2 kg).  Medication Reconciliation: complete     Lucille Parikh MA    "

## 2017-04-19 NOTE — PROGRESS NOTES
HPI  ED/UC Followup:    Facility:  [facility]-ED  Date of visit: 4/17/17  Reason for visit: Abdominal pain  Current Status: Pt still has pain and she has not eaten in the last 4 days. Keeping water down with small sips.  Tried to eat bread last night, vomited back up, felt severe sharp epigastric/right flank pain.  No black or bloody stools, no hematemesis.  She is stooling normally (for her is maybe twice per week).        She was seen in UC 4/15/17, at which point she had had some intermittent right flank pain and epigastric pain for 9 days.  She was advised to start prilosec, follow a bland diet, and was prescribed bactrim for possible pyelonephritis.  She continued to have pain, so she was seen in the ER on 4/17/17; she states that she was given carafate and GI cocktail, which mildly improved her symptoms.  She denies any fevers, chills, dysuria, frequency, hematuria.  She has a constant gnawing epigastric/right upper quadrant pain that becomes much more severe, sharp, and radiates to her right flank when she tries to eat anything.  She was in the fetal position last night after trying some bread, which she then vomited up.  She felt some relief after vomiting.  She has not been able to take the bactrim.  She is taking the prilosec 20mg once daily, but not seeing any improvement.      Review of Systems   Constitutional: Positive for malaise/fatigue and weight loss. Negative for chills, diaphoresis and fever.   HENT: Negative for congestion and sore throat.    Respiratory: Negative for cough.    Cardiovascular: Negative for chest pain.   Gastrointestinal: Positive for abdominal pain, heartburn, nausea and vomiting. Negative for blood in stool, constipation, diarrhea and melena.   Genitourinary: Positive for flank pain. Negative for dysuria, frequency, hematuria and urgency.   Skin: Negative for rash.       No Known Allergies  Current Outpatient Prescriptions   Medication     omeprazole (PRILOSEC) 40 MG capsule      magic mouthwash suspension (diphenhydramine, lidocaine, aluminum-magnesium & simethicone)     sucralfate (CARAFATE) 1 GM tablet     sulfamethoxazole-trimethoprim (BACTRIM DS/SEPTRA DS) 800-160 MG per tablet     No current facility-administered medications for this visit.      Active Ambulatory Problems     Diagnosis Date Noted     CARDIOVASCULAR SCREENING; LDL GOAL LESS THAN 160 03/08/2017     Resolved Ambulatory Problems     Diagnosis Date Noted     No Resolved Ambulatory Problems     No Additional Past Medical History       Physical Exam   Constitutional:   Ill-appearing, but not toxic.   HENT:   Nose: Nose normal.   Mouth/Throat: Oropharynx is clear and moist.   Eyes: Conjunctivae are normal. Pupils are equal, round, and reactive to light. Right eye exhibits no discharge. Left eye exhibits no discharge. No scleral icterus.   Neck: Normal range of motion. Neck supple. No thyromegaly present.   Cardiovascular: Normal rate, regular rhythm and normal heart sounds.  Exam reveals no gallop and no friction rub.    No murmur heard.  Pulmonary/Chest: Effort normal and breath sounds normal. No respiratory distress. She has no wheezes. She has no rales.   Abdominal: Soft. Bowel sounds are normal. She exhibits no distension and no mass. There is tenderness (epigastric and RUQ). There is no rebound and no guarding.   No CVA tenderness     Musculoskeletal: She exhibits no edema.   Lymphadenopathy:     She has no cervical adenopathy.   Neurological: She is alert.   Skin: Skin is warm and dry. No rash noted. She is not diaphoretic.   Vitals reviewed.    /66 (BP Location: Right arm, Patient Position: Chair, Cuff Size: Adult Regular)  Pulse 96  Temp 97.9  F (36.6  C) (Oral)  Wt 135 lb (61.2 kg)  SpO2 98%  BMI 21.79 kg/m2    A/P  Epigastric/RUQ pain: I reviewed her UC and ER notes and lab results; her urine in the UC  Showed no blood, no nitrites, and trace LE; her micro showed 2-5 WBCs, few squamous cells, few bacteria,  no RBCs.  Her creatinine was mildly elevated at 1.10, lipase and amylase were normal, cbc was normal other than mildly elevated h/h (probably due to volume contraction with dehydration), normal LFTs and lytes.  Her ER labs for CBC, CMP, lipase and UA were normal, her pregnancy test was negative.  She also had an h. Pylori stool test that was negative.  I think this is most likely to be severe gastritis vs possible PUD with constant gnawing pain that gets worse after eating, a little less likely to be GBD, less likely to be pancreatitis, and less likely to be pyelonephritis, given her urine samples.  I also considered bowel obstruction, but this seems less likely as well, given that she is passing stool/gas from below and no known risk factors.  Her abdominal exam today showed no rebound or guarding, her vitals are stable, and I do not think this is a surgical abdomen.  I advised it was fine to not take the bactrim.  I am increasing her PPI to 40mg BID; advised carafate 3-4 times daily to try and help her tolerate PO intake.  I will send in some GI cocktail as well to try and help her pain.  I reviewed reasons to go back to the ER, such as dizziness, unrelenting abd pain, fever, black or bloody stools, inability to pass stools, or if unable to tolerate fluids.  I will check an ultrasound to eval for GBD.  I will put in a referral for consultation with GI as well.

## 2017-04-19 NOTE — MR AVS SNAPSHOT
After Visit Summary   4/19/2017    Kathya Ac    MRN: [id number]           Patient Information     Date Of Birth          1985        Visit Information        Provider Department      4/19/2017 12:40 PM Adriana Sales MD [facility]        Today's Diagnoses     Acute gastritis, presence of bleeding unspecified, unspecified gastritis type    -  1    RUQ abdominal pain           Follow-ups after your visit        Additional Services     GASTROENTEROLOGY ADULT REF CONSULT ONLY       Preferred Location: [facility], [facility] [phone] and [facility] [phone]      Please be aware that coverage of these services is subject to the terms and limitations of your health insurance plan.  Call member services at your health plan with any benefit or coverage questions.  Any procedures must be performed at a [facility] facility OR coordinated by your clinic's referral office.    Please bring the following with you to your appointment:    (1) Any X-Rays, CTs or MRIs which have been performed.  Contact the facility where they were done to arrange for  prior to your scheduled appointment.    (2) List of current medications   (3) This referral request   (4) Any documents/labs given to you for this referral                  Who to contact     If you have questions or need follow up information about today's clinic visit or your schedule please contact [facility] directly at [phone].  Normal or non-critical lab and imaging results will be communicated to you by MyChart, letter or phone within 4 business days after the clinic has received the results. If you do not hear from us within 7 days, please contact the clinic through MyChart or phone. If you have a critical or abnormal lab result, we will notify you by phone as soon as possible.  Submit refill requests through [employer] or call your pharmacy and they will forward the refill request to us. Please allow 3  "business days for your refill to be completed.          Additional Information About Your Visit        MyChart Information     [employer] lets you send messages to your doctor, view your test results, renew your prescriptions, schedule appointments and more. To sign up, go to [email or web address] . Click on \"Log in\" on the left side of the screen, which will take you to the Welcome page. Then click on \"Sign up Now\" on the right side of the page.     You will be asked to enter the access code listed below, as well as some personal information. Please follow the directions to create your username and password.     Your access code is: [email or web address]  Expires: 2017  2:19 PM     Your access code will  in 90 days. If you need help or a new code, please call your [place] clinic or [phone].        Care EveryWhere ID     This is your Care EveryWhere ID. This could be used by other organizations to access your [place] medical records  [id number]        Your Vitals Were     Pulse Temperature Pulse Oximetry BMI (Body Mass Index)          96 97.9  F (36.6  C) (Oral) 98% 21.79 kg/m2         Blood Pressure from Last 3 Encounters:   17 110/66   17 119/82   04/15/17 110/70    Weight from Last 3 Encounters:   17 135 lb (61.2 kg)   17 138 lb (62.6 kg)   04/15/17 138 lb (62.6 kg)              We Performed the Following     GASTROENTEROLOGY ADULT REF CONSULT ONLY          Today's Medication Changes          These changes are accurate as of: 17 11:59 PM.  If you have any questions, ask your nurse or doctor.               Start taking these medicines.        Dose/Directions    lidocaine visc 2% & diphenhydramine 12.5mg/5mL & maalox/mylanta w/simethicone (1:1:1 v/v/v) Susp compounding kit   Used for:  Acute gastritis, presence of bleeding unspecified, unspecified gastritis type   Started by:  Adriana Sales MD        Dose:  5-10 mL   Take 5-10 mLs by mouth 3 times daily as needed for " mouth sores   Quantity:  237 mL   Refills:  0       omeprazole 40 MG capsule   Commonly known as:  priLOSEC   Used for:  Acute gastritis, presence of bleeding unspecified, unspecified gastritis type   Started by:  Adriana Sales MD        Dose:  40 mg   Take 1 capsule (40 mg) by mouth 2 times daily (before meals) Take 30-60 minutes before a meal.   Quantity:  60 capsule   Refills:  1         Stop taking these medicines if you haven't already. Please contact your care team if you have questions.     PRILOSEC OTC PO   Stopped by:  Adriana Sales MD                Where to get your medicines      These medications were sent to [facility] - [place], [place] - [address] AT [facility] OF [facility] [address], [facility] [address]    Hours:  called pharm.  they do accept faxes Phone:  [phone]     lidocaine visc 2% & diphenhydramine 12.5mg/5mL & maalox/mylanta w/simethicone (1:1:1 v/v/v) Susp compounding kit    omeprazole 40 MG capsule                Primary Care Provider    Fvl None       No address on file        Thank you!     Thank you for choosing [facility]  for your care. Our goal is always to provide you with excellent care. Hearing back from our patients is one way we can continue to improve our services. Please take a few minutes to complete the written survey that you may receive in the mail after your visit with us. Thank you!             Your Updated Medication List - Protect others around you: Learn how to safely use, store and throw away your medicines at www.disposemymeds.org.          This list is accurate as of: 4/19/17 11:59 PM.  Always use your most recent med list.                   Brand Name Dispense Instructions for use    lidocaine visc 2% & diphenhydramine 12.5mg/5mL & maalox/mylanta w/simethicone (1:1:1 v/v/v) Susp compounding kit     237 mL    Take 5-10 mLs by mouth 3 times daily as needed for mouth  sores       omeprazole 40 MG capsule    priLOSEC    60 capsule    Take 1 capsule (40 mg) by mouth 2 times daily (before meals) Take 30-60 minutes before a meal.       sucralfate 1 GM tablet    CARAFATE    28 tablet    Take 1 tablet (1 g) by mouth 4 times daily (before meals and nightly) for 7 days       sulfamethoxazole-trimethoprim 800-160 MG per tablet    BACTRIM DS/SEPTRA DS    14 tablet    Take 1 tablet by mouth 2 times daily for 7 days

## 2017-04-20 ENCOUNTER — TELEPHONE (OUTPATIENT)
Dept: GENERAL RADIOLOGY | Facility: CLINIC | Age: 32
End: 2017-04-20

## 2017-04-20 DIAGNOSIS — K27.9 PUD (PEPTIC ULCER DISEASE): Primary | ICD-10-CM

## 2017-04-20 DIAGNOSIS — K29.00 ACUTE GASTRITIS, PRESENCE OF BLEEDING UNSPECIFIED, UNSPECIFIED GASTRITIS TYPE: ICD-10-CM

## 2017-04-20 RX ORDER — OMEPRAZOLE 40 MG/1
40 CAPSULE, DELAYED RELEASE ORAL
Qty: 60 CAPSULE | Refills: 1 | Status: SHIPPED | OUTPATIENT
Start: 2017-04-20 | End: 2021-10-08

## 2017-04-20 NOTE — TELEPHONE ENCOUNTER
PA.  Reason is peptic ulcer disease.  Has tried sulcralfate, omeprazole 20mg daily and GI cocktail.  Just a two week course of bid dosing would be ok.

## 2017-04-20 NOTE — TELEPHONE ENCOUNTER
Rx was denied for below medication/s:    Med Prescribed:  OMEPRAZOLE 40 MG  Reason:  PA NEEDED  Recommendations from Insurance:  PA  Insurance Plan:   NOT GIVEN  Patient ID:  [id number]  BIN/RX#:  [id number]  Phone:  [phone]  Fax:       On current med list:  YES    Would you like to change Rx or start PA. If you would like to start PA please include any pertinent information as to why it is needed, other medications taken and reasons why other medication were discontinued.      Please forward to your MA pool.      Thank you,  Taylor Anderson RT (R)(M)

## 2017-04-20 NOTE — TELEPHONE ENCOUNTER
OCTAVIO and asked her ot call clinic back and ask for triage nurse. Message from her provider below.   Taylor Marquis RN

## 2017-04-20 NOTE — TELEPHONE ENCOUNTER
Please call her and check on her symptoms of epigastric abdominal pain.   Let me know if she has had further vomiting; I will likely recommend a CT scan.  She was going to make an appt with GI, please see if she was able to do that.   I will try a different dx code for the omeprazole.

## 2017-04-20 NOTE — TELEPHONE ENCOUNTER
Rx was denied for below medication/s:    Med Prescribed:  OMEPRAZOLE 40 MG  Reason:  PLAN DOES NOT COVER THIS MED AT BID DOSING  Recommendations from Insurance:  PA  Insurance Plan:  NOT GIVEN  Patient ID:  [id number]  BIN/RX#:  [id number]  Phone:  [phone]  Fax:       On current med list:  YES    Would you like to change Rx or start PA. If you would like to start PA please include any pertinent information as to why it is needed, other medications taken and reasons why other medication were discontinued.      Please forward to your MA pool.      Thank you,  Taylor ZAMORA (R)(M)

## 2017-04-21 NOTE — TELEPHONE ENCOUNTER
PA sent to [employer] via CMM. Waiting on approval/denial.    LOJA: [id number]    Deysi Parikh MA

## 2017-04-21 NOTE — TELEPHONE ENCOUNTER
Adriana RODRIGUEZ updates:  Vomiting stopped about 1 1/2 day ago but she will continue to monitor and let you know if it comes back.    Attempted to schedule with [facility] but so far unable. Will try [facility].    Let her know we would try a different DX code for the omeprazole so a PA may not need to be done.  Taylor Marquis RN

## 2017-04-27 NOTE — TELEPHONE ENCOUNTER
Prior authorization was submitting today.  Will hear back within 24 hours.    Lilia Burdick, [facility]

## 2017-04-27 NOTE — TELEPHONE ENCOUNTER
[facility] sent approval for Rx Omeprazole DR 40 mg from 4/27/2017 - 4/27/2018. Will need new PA in 2018 if still needs this med. Put in abstract. Gia Quan CMA

## 2017-04-27 NOTE — TELEPHONE ENCOUNTER
Sandra is the RX provider for this patient, [phone], option 2.  Called Prior Authorization line.  The PA is under review.  Will hear determination via fax within 24 hours.    Lilia Burdick, CMA

## 2017-08-21 ENCOUNTER — NURSE TRIAGE (OUTPATIENT)
Dept: NURSING | Facility: CLINIC | Age: 32
End: 2017-08-21

## 2017-08-21 ENCOUNTER — OFFICE VISIT (OUTPATIENT)
Dept: FAMILY MEDICINE | Facility: CLINIC | Age: 32
End: 2017-08-21
Payer: COMMERCIAL

## 2017-08-21 VITALS
OXYGEN SATURATION: 97 % | RESPIRATION RATE: 18 BRPM | HEART RATE: 79 BPM | WEIGHT: 136.38 LBS | BODY MASS INDEX: 22.01 KG/M2 | TEMPERATURE: 98.4 F | DIASTOLIC BLOOD PRESSURE: 72 MMHG | SYSTOLIC BLOOD PRESSURE: 114 MMHG

## 2017-08-21 DIAGNOSIS — N64.52 NIPPLE DISCHARGE, BLOODY: Primary | ICD-10-CM

## 2017-08-21 DIAGNOSIS — R23.3 EASY BRUISING: ICD-10-CM

## 2017-08-21 DIAGNOSIS — R63.4 WEIGHT LOSS: ICD-10-CM

## 2017-08-21 LAB
ERYTHROCYTE [DISTWIDTH] IN BLOOD BY AUTOMATED COUNT: 12.6 % (ref 10–15)
HCT VFR BLD AUTO: 41.8 % (ref 35–47)
HGB BLD-MCNC: 13.8 G/DL (ref 11.7–15.7)
INR PPP: 0.99 (ref 0.86–1.14)
MCH RBC QN AUTO: 28.6 PG (ref 26.5–33)
MCHC RBC AUTO-ENTMCNC: 33 G/DL (ref 31.5–36.5)
MCV RBC AUTO: 87 FL (ref 78–100)
PLATELET # BLD AUTO: 249 10E9/L (ref 150–450)
RBC # BLD AUTO: 4.82 10E12/L (ref 3.8–5.2)
WBC # BLD AUTO: 9.2 10E9/L (ref 4–11)

## 2017-08-21 PROCEDURE — 36415 COLL VENOUS BLD VENIPUNCTURE: CPT | Performed by: NURSE PRACTITIONER

## 2017-08-21 PROCEDURE — 85027 COMPLETE CBC AUTOMATED: CPT | Performed by: NURSE PRACTITIONER

## 2017-08-21 PROCEDURE — 85610 PROTHROMBIN TIME: CPT | Performed by: NURSE PRACTITIONER

## 2017-08-21 PROCEDURE — 84443 ASSAY THYROID STIM HORMONE: CPT | Performed by: NURSE PRACTITIONER

## 2017-08-21 PROCEDURE — 99214 OFFICE O/P EST MOD 30 MIN: CPT | Performed by: NURSE PRACTITIONER

## 2017-08-21 NOTE — MR AVS SNAPSHOT
After Visit Summary   8/21/2017    Kathya Ac    MRN: [id number]           Patient Information     Date Of Birth          1985        Visit Information        Provider Department      8/21/2017 1:30 PM Michaela Sierra NP [facility]        Today's Diagnoses     Nipple discharge, bloody    -  1    Easy bruising        Weight loss           Follow-ups after your visit        Additional Services     BREAST CENTER REFERRAL       Your provider has referred you to: [facility]: Breast Center at [facility] [phone]   [email or web address]    Please be aware that coverage of these services is subject to the terms and limitations of your health insurance plan.  Call member services at your health plan with any benefit or coverage questions.      Please bring the following with you to your appointment:    (1) Any X-Rays, CTs or MRIs which have been performed.  Contact the facility where they were done to arrange for  prior to your scheduled appointment.    (2) List of current medications   (3) This referral request   (4) Any documents/labs given to you for this referral                  Future tests that were ordered for you today     Open Future Orders        Priority Expected Expires Ordered    US Breast Left Complete 4 Quadrants Routine  8/21/2018 8/21/2017            Who to contact     If you have questions or need follow up information about today's clinic visit or your schedule please contact [facility] directly at [phone].  Normal or non-critical lab and imaging results will be communicated to you by MyChart, letter or phone within 4 business days after the clinic has received the results. If you do not hear from us within 7 days, please contact the clinic through MyChart or phone. If you have a critical or abnormal lab result, we will notify you by phone as soon as  "possible.  Submit refill requests through [employer] or call your pharmacy and they will forward the refill request to us. Please allow 3 business days for your refill to be completed.          Additional Information About Your Visit        [email or web address] Information     [employer] lets you send messages to your doctor, view your test results, renew your prescriptions, schedule appointments and more. To sign up, go to [email or web address] . Click on \"Log in\" on the left side of the screen, which will take you to the Welcome page. Then click on \"Sign up Now\" on the right side of the page.     You will be asked to enter the access code listed below, as well as some personal information. Please follow the directions to create your username and password.     Your access code is: [email or web address]  Expires: 2017  2:17 PM     Your access code will  in 90 days. If you need help or a new code, please call your [place] clinic or [phone].        Care EveryWhere ID     This is your Care EveryWhere ID. This could be used by other organizations to access your [facility] medical records  [id number]        Your Vitals Were     Pulse Temperature Respirations Pulse Oximetry BMI (Body Mass Index)       79 98.4  F (36.9  C) (Oral) 18 97% 22.01 kg/m2        Blood Pressure from Last 3 Encounters:   17 114/72   17 110/66   17 119/82    Weight from Last 3 Encounters:   17 136 lb 6 oz (61.9 kg)   17 135 lb (61.2 kg)   17 138 lb (62.6 kg)              We Performed the Following     BREAST CENTER REFERRAL     CBC with platelets     INR     TSH with free T4 reflex        Primary Care Provider Office Phone # Fax #    Adriana Sales [phone] [address] [place] [address]        Equal Access to Services     THIAGO RILEY : Maycol De Oliveira, waaxda luqadaha, qaybta kaalmateena ellis . So [facility] " [phone].    ATENCIÓN: Si pillo lutz, tiene a allen disposición servicios gratuitos de asistencia lingüística. Destini miguel [phone].    We comply with applicable federal civil rights laws and [place] laws. We do not discriminate on the basis of race, color, national origin, age, disability sex, sexual orientation or gender identity.            Thank you!     Thank you for choosing [facility]  for your care. Our goal is always to provide you with excellent care. Hearing back from our patients is one way we can continue to improve our services. Please take a few minutes to complete the written survey that you may receive in the mail after your visit with us. Thank you!             Your Updated Medication List - Protect others around you: Learn how to safely use, store and throw away your medicines at www.disposemymeds.org.          This list is accurate as of: 8/21/17  2:17 PM.  Always use your most recent med list.                   Brand Name Dispense Instructions for use Diagnosis    lidocaine visc 2% & diphenhydramine 12.5mg/5mL & maalox/mylanta w/simethicone (1:1:1 v/v/v) Susp compounding kit     237 mL    Take 5-10 mLs by mouth 3 times daily as needed for mouth sores    Acute gastritis, presence of bleeding unspecified, unspecified gastritis type       omeprazole 40 MG capsule    priLOSEC    60 capsule    Take 1 capsule (40 mg) by mouth 2 times daily (before meals) Take 30-60 minutes before a meal.    PUD (peptic ulcer disease)

## 2017-08-21 NOTE — NURSING NOTE
"Chief Complaint   Patient presents with     Breast Problem     this morning      Bleeding/Bruising     Bruising on legs x several months        Initial /72  Pulse 79  Temp 98.4  F (36.9  C) (Oral)  Resp 18  Wt 136 lb 6 oz (61.9 kg)  SpO2 97%  BMI 22.01 kg/m2 Estimated body mass index is 22.01 kg/(m^2) as calculated from the following:    Height as of 4/17/17: 5' 6\" (1.676 m).    Weight as of this encounter: 136 lb 6 oz (61.9 kg).  Medication Reconciliation: complete     Diamond Atkins MA      "

## 2017-08-21 NOTE — PROGRESS NOTES
SUBJECTIVE:   Kathya Ac is a 31 year old female who presents to clinic today for the following health issues:      Breast Problem   Leg Problem       Duration: blood this morning and bruising for several months     Description (location/character/radiation): bruising on leg and breast feeding; blood leakage.     Pt went to pump this morning and blood was leaking from breast, bright red and a lot.    Random large bruises on legs without explanation.    No epistaxis, hematuria, melena or hematochezia.  She has always had bleeding gums around a crowned front tooth when she brushes.    She takes a multivitamin.        She pumped late morning today and noticed blood in the breast milk, some stranding clots.  No nipple rashes, cracks, breast pain, lumps.    She has been breast feeding for 11 months.    No family history of breast cancer.              Problem list and histories reviewed & adjusted, as indicated.  Additional history: as documented        Reviewed and updated as needed this visit by clinical staff     Reviewed and updated as needed this visit by Provider         ROS:  C: NEGATIVE for fever, chills, change in weight  E/M: NEGATIVE for ear, mouth and throat problems  R: NEGATIVE for significant cough or SOB  BREAST: see HPI  CV: NEGATIVE for chest pain, palpitations or peripheral edema  GI: NEGATIVE for nausea, abdominal pain, heartburn, or change in bowel habits  MUSCULOSKELETAL: NEGATIVE for significant arthralgias or myalgia  NEURO: NEGATIVE for weakness, dizziness or paresthesias    OBJECTIVE:     /72  Pulse 79  Temp 98.4  F (36.9  C) (Oral)  Resp 18  Wt 136 lb 6 oz (61.9 kg)  SpO2 97%  BMI 22.01 kg/m2  Body mass index is 22.01 kg/(m^2).  GENERAL: healthy, alert and no distress  RESP: lungs clear to auscultation - no rales, rhonchi or wheezes  BREAST: normal without masses, tenderness or nipple discharge and no palpable axillary masses or adenopathy  CV: regular rate and rhythm, normal S1  S2, no S3 or S4, no murmur, click or rub, no peripheral edema and peripheral pulses strong  ABDOMEN: soft, nontender, no hepatosplenomegaly, no masses and bowel sounds normal  MS: no gross musculoskeletal defects noted, no edema  SKIN: few ecchymoses on legs  NEURO: Normal strength and tone, mentation intact and speech normal  PSYCH: mentation appears normal, affect normal/bright    Diagnostic Test Results:  No results found for this or any previous visit (from the past 24 hour(s)).    ASSESSMENT/PLAN:             1. Nipple discharge, bloody  Discussed that this is typically a benign finding in breastfeeding women, but will evaluate further with a breast US and consult at the Breast Center.    - US Breast Left Complete 4 Quadrants; Future  - BREAST CENTER REFERRAL    2. Easy bruising    - CBC with platelets  - INR    3. Weight loss    - TSH with free T4 reflex        Michaela Sierra NP  [facility]

## 2017-08-21 NOTE — TELEPHONE ENCOUNTER
Additional Information    Patient is breastfeeding    Negative: Baby is difficult to awaken or keep awake (Exception: baby needs normal sleep)    Negative: [1] Baby < 1 year old AND [2] very weak (doesn't move or make eye contact)    Negative: Sounds like a life-threatening emergency to the triager    Negative: Baby is formula fed    Negative: Baby spitting up is the main concern    Negative: Baby jaundice up is the main concern    Negative: [1] Baby < 12 weeks old AND [2] fever  > 100.4 F (38.0 C) rectally    Negative: Baby has suspected dehydration (e.g., no urine > 8 hours, brick dust urine 3 or more times, sunken soft spot, very dry mouth) (Exception: no urine > 12 hours on day 2 of life OR > 8 hours on day 3-4 of life and without other signs of dehydration)    Negative: [1] Baby day 2 of life AND [2] no urine > 12 hours AND [3] after given supplemental feeding    Negative: [1] Baby day 3-4 of life AND [2] no urine > 8 hours AND [3] after given supplemental feeding    Negative: [1] Baby < 1 month old ()  AND [2] starts to look or act sick in any way    Negative: [1] Baby < 1 month old AND [2] refuses to breastfeed AND [3] for > 6 hours    Negative: [1] Baby < 12 months AND [2] weak suck / weak muscles AND [3] new onset    Negative: Baby sounds very sick to the triager    Negative: Mother (patient) sounds very sick or weak to the triager    Negative: [1] Baby refuses to drink anything (including supplemental formula or expressed breastmilk) AND [2] for > 8 hours    Negative: Baby's skin and whites of the eyes look deep yellow or orange    Negative: [1] Breast looks infected (e.g., area of redness) AND [2] fever > 100.4 F (38.0 C)    Negative: [1] Baby day 2 of life AND [2] no urine > 12 hours    Negative: [1] Baby day 3 or 4 of life AND [2] no urine > 8 hours    Negative: [1] Baby day 2 of life AND [2] requires supplemental feeding to urinate every 12 hours    Negative: [1] Baby day 2 or 3 of life AND  [2] no stool in 24 hours AND [3] exclusively     Negative: [1] Baby day 3 or 4 of life AND [2] requires supplemental feeding to urinate every 8 hours    Negative: [1] Baby day 4 of life or later AND [2] bowel movements are < 3 per day (should be yellow-colored by day 5) (Exception:  normal infrequent stools after 4 weeks)    Negative: Wet diapers are < 6 per day  (Exception:  can be normal while milk volume is increasing during 1- 4 days of life)    Negative: [1] Baby < 1 month old AND [2] seems hungry after feedings (cries after nursing OR wants to feed over 12 times/day)    Negative: [1] Baby < 1 month old AND [2] needs to be repeatedly awakened for feedings (every 3 hours during the day) BUT [3] alert and feeds well when awakened    Negative: [1] Breast looks infected (e.g., area of redness) AND [2] no fever    Negative: [1] Breast pain AND [2] present > 7 days    Negative: Baby needs a formula or expressed breastmilk supplement during 1st month    Negative: [1] Baby 5 to 30 days old AND [2] doesn't seem to be gaining weight    Negative: [1] Baby > 1 month old AND [2] seems hungry after feedings OR doesn't seem to be gaining weight    Negative: Frequency of feedings to establish adequate milk volume, questions about    Negative: Length of feedings to establish adequate milk volume, questions about    Negative: Signs of an adequate milk volume, questions about    Negative: How to increase milk volume, questions about    Negative: Supplemental formula, questions about    Negative: Extra water, questions about    Negative: Engorgement (generalized swelling and pain) of both breasts, questions about    Negative: Blocked milk ducts (tender lumps in the breast), questions about    Negative: Sore or cracked nipples, questions about    Negative: [1] Spit up blood AND [2] sore, bleeding nipples    Negative: Maternal medications, questions about    Negative: Maternal smoking or tobacco use, questions about     "Negative: Maternal diet, questions about    Negative: Sick infants who are breastfeeding, questions about    Negative: Maternal Illness (mother is sick) and continuing breastfeeding, questions about    Negative: [1] Age > 4 weeks AND [2] normal infrequent stools (all triage questions negative)    Negative: Stools, all other questions about    Negative: Leaking milk from breast, questions about    Negative: Vitamin D and fluoride, questions about    Negative: Storage of pumped breastmilk, questions about    Negative: Overactive letdown that causes pulling away, coughing or choking    Negative: All breastfeeding topics (all Care Advice), questions about    Protocols used: BREAST SYMPTOMS-ADULT-AH, POSTPARTUM - BREASTFEEDING QUESTIONS-ADULT-AH  Patient states she has an 11 month old baby and is breast feeding.  \"This morning while I was pumping my left breast, all that came out was a couple of blood clots and bright red blood.\"  Patient denies any injury to nipple, no inflammation.  Patient also states she has some right leg bruising \"that just appeared\", denies any injury.  Transferred to central scheduling.  "

## 2017-08-22 LAB — TSH SERPL DL<=0.005 MIU/L-ACNC: 2.34 MU/L (ref 0.4–4)

## 2017-09-07 ENCOUNTER — OFFICE VISIT (OUTPATIENT)
Dept: URGENT CARE | Facility: URGENT CARE | Age: 32
End: 2017-09-07
Payer: COMMERCIAL

## 2017-09-07 VITALS
BODY MASS INDEX: 21.79 KG/M2 | TEMPERATURE: 98.1 F | SYSTOLIC BLOOD PRESSURE: 106 MMHG | HEART RATE: 68 BPM | WEIGHT: 135 LBS | OXYGEN SATURATION: 98 % | RESPIRATION RATE: 12 BRPM | DIASTOLIC BLOOD PRESSURE: 72 MMHG

## 2017-09-07 DIAGNOSIS — H92.01 EAR PAIN, RIGHT: Primary | ICD-10-CM

## 2017-09-07 PROCEDURE — 99213 OFFICE O/P EST LOW 20 MIN: CPT | Performed by: PHYSICIAN ASSISTANT

## 2017-09-07 RX ORDER — AMOXICILLIN 875 MG
875 TABLET ORAL 2 TIMES DAILY
Qty: 20 TABLET | Refills: 0 | Status: SHIPPED | OUTPATIENT
Start: 2017-09-07 | End: 2017-09-17

## 2017-09-08 NOTE — PROGRESS NOTES
HPI:  Kathya is a 32 yo female who presents for right ear pain and pressure x 2 weeks.  She reports sx are worsening.  She denies cold sx, runny nose or cough,  No fever.  She tried sudafed for 1 day for relief, but it did not resolve it.      ROS:  See HPI      PE:  Vitals & nursing notes reviewed.  B/P: 106/72, T: 98.1, P: 68, R: 12  Constitutional:  Alert, well nourished, well-developed, NAD  Head:  Atraumatic, normocephalic  Eyes:  Perrla, EOMI, conjunctiva:  pink  Sclera:  Anicteric  Ears:  Canals clear BL, RT TM - Mild erythema with opaque, likely purulent fluid seen  behind TM.  LT TM - Mild erythema    pearly BL  Throat:  No erythema, exudates, or edema to postoropharynx      ASSESSMENT:  1. Right ear pain  Comment:  Minor erythema but there does appear to be some purulent fluid seen behind TM.  Decision to tx.  Likely has some Eustachian tube dysfx as well.  Plan:  Amoxicillin  - see order  Sudafed for ETD.  Tylenol or advil for pain & fever PRN   F/U with  ENT if sx persist or worsen.

## 2017-09-08 NOTE — NURSING NOTE
"Chief Complaint   Patient presents with     Urgent Care     Ear Problem     right ear pain x 2 weeks and it seems to be geting worse.  Feels some pressure in her ear.       Initial /72 (BP Location: Right arm, Patient Position: Sitting, Cuff Size: Adult Regular)  Pulse 68  Temp 98.1  F (36.7  C) (Oral)  Resp 12  Wt 135 lb (61.2 kg)  SpO2 98%  BMI 21.79 kg/m2 Estimated body mass index is 21.79 kg/(m^2) as calculated from the following:    Height as of 4/17/17: 5' 6\" (1.676 m).    Weight as of this encounter: 135 lb (61.2 kg)..  BP completed using cuff size: regular  Deepthi Lawton R.N.    "

## 2017-09-28 ENCOUNTER — RADIANT APPOINTMENT (OUTPATIENT)
Dept: GENERAL RADIOLOGY | Facility: CLINIC | Age: 32
End: 2017-09-28
Attending: FAMILY MEDICINE
Payer: COMMERCIAL

## 2017-09-28 ENCOUNTER — OFFICE VISIT (OUTPATIENT)
Dept: FAMILY MEDICINE | Facility: CLINIC | Age: 32
End: 2017-09-28
Payer: COMMERCIAL

## 2017-09-28 VITALS
RESPIRATION RATE: 18 BRPM | HEART RATE: 73 BPM | OXYGEN SATURATION: 97 % | BODY MASS INDEX: 22.24 KG/M2 | WEIGHT: 137.8 LBS | SYSTOLIC BLOOD PRESSURE: 107 MMHG | DIASTOLIC BLOOD PRESSURE: 73 MMHG | TEMPERATURE: 97.9 F

## 2017-09-28 DIAGNOSIS — L40.9 PSORIASIS: ICD-10-CM

## 2017-09-28 DIAGNOSIS — Z23 NEED FOR PROPHYLACTIC VACCINATION AND INOCULATION AGAINST INFLUENZA: ICD-10-CM

## 2017-09-28 DIAGNOSIS — R07.81 RIB PAIN: ICD-10-CM

## 2017-09-28 DIAGNOSIS — H92.01 RIGHT EAR PAIN: ICD-10-CM

## 2017-09-28 DIAGNOSIS — Z00.00 ROUTINE GENERAL MEDICAL EXAMINATION AT A HEALTH CARE FACILITY: Primary | ICD-10-CM

## 2017-09-28 DIAGNOSIS — M54.6 ACUTE MIDLINE THORACIC BACK PAIN: ICD-10-CM

## 2017-09-28 DIAGNOSIS — G89.29 CHRONIC PAIN OF LEFT KNEE: ICD-10-CM

## 2017-09-28 DIAGNOSIS — M25.562 CHRONIC PAIN OF LEFT KNEE: ICD-10-CM

## 2017-09-28 DIAGNOSIS — R61 NIGHT SWEATS: ICD-10-CM

## 2017-09-28 PROBLEM — N80.9 ENDOMETRIOSIS: Status: ACTIVE | Noted: 2017-09-28

## 2017-09-28 LAB
BASOPHILS # BLD AUTO: 0 10E9/L (ref 0–0.2)
BASOPHILS NFR BLD AUTO: 0.4 %
CHOLEST SERPL-MCNC: 139 MG/DL
DIFFERENTIAL METHOD BLD: NORMAL
EOSINOPHIL # BLD AUTO: 0.2 10E9/L (ref 0–0.7)
EOSINOPHIL NFR BLD AUTO: 3.3 %
ERYTHROCYTE [DISTWIDTH] IN BLOOD BY AUTOMATED COUNT: 12.5 % (ref 10–15)
ERYTHROCYTE [SEDIMENTATION RATE] IN BLOOD BY WESTERGREN METHOD: 4 MM/H (ref 0–20)
GLUCOSE SERPL-MCNC: 72 MG/DL (ref 70–99)
HCT VFR BLD AUTO: 41 % (ref 35–47)
HDLC SERPL-MCNC: 74 MG/DL
HGB BLD-MCNC: 13.4 G/DL (ref 11.7–15.7)
LDLC SERPL CALC-MCNC: 59 MG/DL
LYMPHOCYTES # BLD AUTO: 2.1 10E9/L (ref 0.8–5.3)
LYMPHOCYTES NFR BLD AUTO: 40.6 %
MCH RBC QN AUTO: 28.8 PG (ref 26.5–33)
MCHC RBC AUTO-ENTMCNC: 32.7 G/DL (ref 31.5–36.5)
MCV RBC AUTO: 88 FL (ref 78–100)
MONOCYTES # BLD AUTO: 0.3 10E9/L (ref 0–1.3)
MONOCYTES NFR BLD AUTO: 5 %
NEUTROPHILS # BLD AUTO: 2.7 10E9/L (ref 1.6–8.3)
NEUTROPHILS NFR BLD AUTO: 50.7 %
NONHDLC SERPL-MCNC: 65 MG/DL
PLATELET # BLD AUTO: 245 10E9/L (ref 150–450)
RBC # BLD AUTO: 4.65 10E12/L (ref 3.8–5.2)
TRIGL SERPL-MCNC: 29 MG/DL
WBC # BLD AUTO: 5.2 10E9/L (ref 4–11)

## 2017-09-28 PROCEDURE — 82947 ASSAY GLUCOSE BLOOD QUANT: CPT | Performed by: FAMILY MEDICINE

## 2017-09-28 PROCEDURE — 36415 COLL VENOUS BLD VENIPUNCTURE: CPT | Performed by: FAMILY MEDICINE

## 2017-09-28 PROCEDURE — 71101 X-RAY EXAM UNILAT RIBS/CHEST: CPT | Mod: RT

## 2017-09-28 PROCEDURE — 85025 COMPLETE CBC W/AUTO DIFF WBC: CPT | Performed by: FAMILY MEDICINE

## 2017-09-28 PROCEDURE — 99395 PREV VISIT EST AGE 18-39: CPT | Mod: 25 | Performed by: FAMILY MEDICINE

## 2017-09-28 PROCEDURE — 85652 RBC SED RATE AUTOMATED: CPT | Performed by: FAMILY MEDICINE

## 2017-09-28 PROCEDURE — 72070 X-RAY EXAM THORAC SPINE 2VWS: CPT

## 2017-09-28 PROCEDURE — 80061 LIPID PANEL: CPT | Performed by: FAMILY MEDICINE

## 2017-09-28 PROCEDURE — 99213 OFFICE O/P EST LOW 20 MIN: CPT | Mod: 25 | Performed by: FAMILY MEDICINE

## 2017-09-28 PROCEDURE — 90686 IIV4 VACC NO PRSV 0.5 ML IM: CPT | Performed by: FAMILY MEDICINE

## 2017-09-28 PROCEDURE — 73562 X-RAY EXAM OF KNEE 3: CPT | Mod: 50

## 2017-09-28 PROCEDURE — 90471 IMMUNIZATION ADMIN: CPT | Performed by: FAMILY MEDICINE

## 2017-09-28 NOTE — PROGRESS NOTES
Injectable Influenza Immunization Documentation    1.  Is the person to be vaccinated sick today?   No    2. Does the person to be vaccinated have an allergy to a component   of the vaccine?   No    3. Has the person to be vaccinated ever had a serious reaction   to influenza vaccine in the past?   No    4. Has the person to be vaccinated ever had Guillain-Barré syndrome?   No    Form completed by patient.      Shelia Parikh MA

## 2017-09-28 NOTE — PROGRESS NOTES
"   SUBJECTIVE:   CC: Kathya Ac is an 31 year old woman who presents for preventive health visit.     Physical   Annual:     Getting at least 3 servings of Calcium per day::  NO    Bi-annual eye exam::  Yes    Dental care twice a year::  Yes    Sleep apnea or symptoms of sleep apnea::  None    Diet::  Regular (no restrictions)    Frequency of exercise::  2-3 days/week    Duration of exercise::  15-30 minutes    Taking medications regularly::  Yes    Medication side effects::  None    Additional concerns today::  YES (pain on right rib, and right back pain and right ear plugged )  psoriasis  Endometriosis  migraines    Right ear has been plugged for about one month; she went to a walk in clinic and was advised she had fluid behind her ear drum and was prescribed amoxicillin.  She took the antibiotics and did not have improvement in her symptoms.  Her hearing on that side is muffled; she has a constant discomfort in the ear, and occasional sharp pain elisa when changing elevation.  No discharge or itching.  No sinus symptoms.  No fevers or chills, but she has had a lot of night sweats over the past 6 weeks.   Sudafed has been mildly helpful for temporary relief.      Right rib and mid back pain: she has two very discrete points of pain that started at the same time, in her right lower anterior rib and in the middle of her back over her tspine.  No radiation of the pain.  Not pleuritic.  She can't think of any injury.  It is very tender to press on these spots, but otherwise the pain is constant.      Bilateral knee pain and stiffness L>R: intermittent for many years, the right knee occasionally gets stiff, the left knee gets stiff and also pops and is very painful intermittently.  She has a h/o psoriasis and states her rheumatologist \"Ran a lot of tests that all came back negative.\"  She denies any current rash and is not taking any medication at this time for her psoriasis.  She denies stiffness/arthralgias in other " joints.  She has had x-rays of the knees done in other health systems.     She is an RN.  She has a one year old daughter (who is also seen here) and is breastfeeding.  She has a four year old son with autism.  She is .   In her ROS she also states that she can't seem to keep on any weight; she states her thyroid was recently tested and was normal.      CV: no family h/o early CAD  Malignancy: her father was diagnosed this year with prostate cancer at age 56.  Her MGF had colon cancer.  Her maternal aunt and her MGM have had melanoma.  She denies any moles of concern.  Her pap is up to date.  Bone health: no current risk factors  Immunizations; needs flu shot; tdap done last year with birth of baby  Sexual health:  has had vasectomy.  No symptoms of concern.  Periods are regular.    Depression screen:    Today's PHQ-2 Score:   PHQ-2 ( 1999 Pfizer) 9/28/2017   Q1: Little interest or pleasure in doing things 0   Q2: Feeling down, depressed or hopeless 0   PHQ-2 Score 0   Q1: Little interest or pleasure in doing things Not at all   Q2: Feeling down, depressed or hopeless Not at all   PHQ-2 Score 0       Abuse: Current or Past(Physical, Sexual or Emotional)- No  Do you feel safe in your environment - Yes    Social History   Substance Use Topics     Smoking status: Never Smoker     Smokeless tobacco: Never Used     Alcohol use No     The patient does not drink >3 drinks per day nor >7 drinks per week.    Reviewed orders with patient.  Reviewed health maintenance and updated orders accordingly - Yes  Patient Active Problem List   Diagnosis     CARDIOVASCULAR SCREENING; LDL GOAL LESS THAN 160     History reviewed. No pertinent surgical history.    Social History   Substance Use Topics     Smoking status: Never Smoker     Smokeless tobacco: Never Used     Alcohol use No     Family History   Problem Relation Age of Onset     Family History Negative Mother      colon polyps     Chronic Obstructive Pulmonary  Disease Father      Hypertension Father      Prostate Cancer Father 56     Alzheimer Disease Maternal Grandmother      Melanoma Maternal Grandmother      Colon Cancer Maternal Grandfather      Melanoma Maternal Aunt          Current Outpatient Prescriptions   Medication Sig Dispense Refill     omeprazole (PRILOSEC) 40 MG capsule Take 1 capsule (40 mg) by mouth 2 times daily (before meals) Take 30-60 minutes before a meal. 60 capsule 1     magic mouthwash suspension (diphenhydramine, lidocaine, aluminum-magnesium & simethicone) Take 5-10 mLs by mouth 3 times daily as needed for mouth sores (Patient not taking: Reported on 9/28/2017) 237 mL 0     No Known Allergies      Mammogram not appropriate for this patient based on age.    Pertinent mammograms are reviewed under the imaging tab.  History of abnormal Pap smear: NO - age 21-29 PAP every 3 years recommended    Reviewed and updated as needed this visit by clinical staffTobacco  Allergies  Meds  Med Hx  Surg Hx  Fam Hx  Soc Hx        Reviewed and updated as needed this visit by Provider            ROS:  C: NEGATIVE for fever, chills; patient reporting night sweats x 6 weeks and trouble gaining weight (is breastfeeding)  I: NEGATIVE for worrisome rashes, moles or lesions  E: NEGATIVE for vision changes or irritation  ENT: NEGATIVE for mouth and throat problems; see HPI for ear symptoms  R: NEGATIVE for significant cough or SOB  B: NEGATIVE for masses, tenderness or discharge  CV: NEGATIVE for chest pain, palpitations or peripheral edema  GI: NEGATIVE for nausea, abdominal pain, heartburn, or change in bowel habits  : NEGATIVE for unusual urinary or vaginal symptoms. Periods are regular.  M: see HPI  N: NEGATIVE for weakness, dizziness or paresthesias  P: NEGATIVE for changes in mood or affect     OBJECTIVE:   /73 (BP Location: Right arm, Patient Position: Sitting, Cuff Size: Adult Regular)  Pulse 73  Temp 97.9  F (36.6  C) (Tympanic)  Resp 18  Wt 137  lb 12.8 oz (62.5 kg)  SpO2 97%  BMI 22.24 kg/m2  EXAM:  GENERAL: healthy, alert and no distress  EYES: Eyes grossly normal to inspection, PERRL and conjunctivae and sclerae normal  HENT: ear canals and TM's normal, nose and mouth without ulcers or lesions  NECK: no adenopathy, no asymmetry, masses, or scars and thyroid normal to palpation  RESP: lungs clear to auscultation - no rales, rhonchi or wheezes  BREAST: normal without masses, tenderness or nipple discharge and no palpable axillary masses or adenopathy  CV: regular rate and rhythm, normal S1 S2, no S3 or S4, no murmur, click or rub, no peripheral edema and peripheral pulses strong  ABDOMEN: soft, nontender, no hepatosplenomegaly, no masses and bowel sounds normal  MS: left knee with some tenderness lateral to the patella and over the quad tendon, full ROM, some clicking noted no joint line tenderness, no laxity with anterior/posterior drawer testing, no pain with varus/valgus stress, negative MacMurray.  Right knee exam is normal.  She has point tenderness over her right anterior lower rib and midline t spine no overlying edema, erythema, bruising or other abnormalities; otherwise no gross musculoskeletal defects noted, no edema  SKIN: no suspicious lesions or rashes  NEURO: Normal strength and tone, mentation intact and speech normal  PSYCH: mentation appears normal, affect normal/bright    ASSESSMENT/PLAN:   1. Routine general medical examination at a health care facility  CV: fasting labs per her request, no risk factors  Malignancy: normal pap 2016, repeat 2019.    Bone health: no risk factors  Immunizations: flu shot today  Sexual health: no concerns  Depression screen: neg  - Lipid Profile  - Glucose  - CBC with platelets and differential    2. Right ear pain  Discussed effusion can take several weeks to resolve; can try decongestants/short term afrin.  F/u with ENT if not resolving in the next 1-2 weeks.   - OTOLARYNGOLOGY REFERRAL    3. Rib  "pain  Unclear what this may be, musculoskeletal strain perhaps; with night sweats and weight loss will check plain films for bony lesion  - XR Ribs & Chest Right G/E 3 Views; Future    4. Chronic pain of left knee  Checking plain films, discussed would probably need to see her back or have her f/u with ortho given lack of time to further evaluate today.    5. Acute midline thoracic back pain  Plain films, as above.    6. Night sweats  Could be related to hormones/breastfeeding, reviewed her normal recent thyroid labs, could be related to rheumatologic disease, or anxiety.  Malignancy is considered and we are evaluating her other concerns accordingly.  - CBC with platelets and differential    7. Need for prophylactic vaccination and inoculation against influenza    - FLU VAC, SPLIT VIRUS IM > 3 YO (QUADRIVALENT) [05638]  - Vaccine Administration, Initial [19323]    COUNSELING:  Reviewed preventive health counseling, as reflected in patient instructions       Regular exercise       Healthy diet/nutrition       Colon cancer screening         reports that she has never smoked. She has never used smokeless tobacco.    Estimated body mass index is 22.24 kg/(m^2) as calculated from the following:    Height as of 4/17/17: 5' 6\" (1.676 m).    Weight as of this encounter: 137 lb 12.8 oz (62.5 kg).       Counseling Resources:  ATP IV Guidelines  Pooled Cohorts Equation Calculator  Breast Cancer Risk Calculator  FRAX Risk Assessment  ICSI Preventive Guidelines  Dietary Guidelines for Americans, 2010  USDA's MyPlate  ASA Prophylaxis  Lung CA Screening    Adriana Sales MD  [facility]  Answers for HPI/ROS submitted by the patient on 9/28/2017   PHQ-2 Score: 0    "

## 2017-09-28 NOTE — MR AVS SNAPSHOT
After Visit Summary   9/28/2017    Kathya Ac    MRN: [id number]           Patient Information     Date Of Birth          1985        Visit Information        Provider Department      9/28/2017 9:00 AM Adriana Sales MD [facility]        Today's Diagnoses     Routine general medical examination at a health care facility    -  1    Right ear pain        Rib pain        Chronic pain of left knee        Acute midline thoracic back pain        Night sweats          Care Instructions      Preventive Health Recommendations  Female Ages 26 - 39  Yearly exam:   See your health care provider every year in order to    Review health changes.     Discuss preventive care.      Review your medicines if you your doctor has prescribed any.    Until age 30: Get a Pap test every three years (more often if you have had an abnormal result).    After age 30: Talk to your doctor about whether you should have a Pap test every 3 years or have a Pap test with HPV screening every 5 years.   You do not need a Pap test if your uterus was removed (hysterectomy) and you have not had cancer.  You should be tested each year for STDs (sexually transmitted diseases), if you're at risk.   Talk to your provider about how often to have your cholesterol checked.  If you are at risk for diabetes, you should have a diabetes test (fasting glucose).  Shots: Get a flu shot each year. Get a tetanus shot every 10 years.   Nutrition:     Eat at least 5 servings of fruits and vegetables each day.    Eat whole-grain bread, whole-wheat pasta and brown rice instead of white grains and rice.    Talk to your provider about Calcium and Vitamin D.     Lifestyle    Exercise at least 150 minutes a week (30 minutes a day, 5 days of the week). This will help you control your weight and prevent disease.    Limit alcohol to one drink per day.    No smoking.     Wear sunscreen to prevent skin cancer.    See your dentist every  six months for an exam and cleaning.            Follow-ups after your visit        Additional Services     OTOLARYNGOLOGY REFERRAL       Your provider has referred you to: [facility]: [facility] (Otolaryngology) - [place] [phone]  [email or web address]  [facility]: [facility] Otolaryngology - [place] [phone]   [email or web address]    Please be aware that coverage of these services is subject to the terms and limitations of your health insurance plan.  Call member services at your health plan with any benefit or coverage questions.      Please bring the following with you to your appointment:    (1) Any X-Rays, CTs or MRIs which have been performed.  Contact the facility where they were done to arrange for  prior to your scheduled appointment.   (2) List of current medications  (3) This referral request   (4) Any documents/labs given to you for this referral                  Future tests that were ordered for you today     Open Future Orders        Priority Expected Expires Ordered    XR Ribs & Chest Right G/E 3 Views Routine 9/28/2017 9/28/2018 9/28/2017    XR Thoracic Spine 1 View Routine 9/28/2017 9/28/2018 9/28/2017    XR Knee Bilateral 1/2 Views Routine 9/28/2017 9/28/2018 9/28/2017            Who to contact     If you have questions or need follow up information about today's clinic visit or your schedule please contact [facility] directly at [phone].  Normal or non-critical lab and imaging results will be communicated to you by MyChart, letter or phone within 4 business days after the clinic has received the results. If you do not hear from us within 7 days, please contact the clinic through [facility] or phone. If you have a critical or abnormal lab result, we will notify you by phone as soon as possible.  Submit refill requests through mygola or call your pharmacy and they will forward the refill request to  us. Please allow 3 business days for your refill to be completed.          Additional Information About Your Visit        MyChart Information     [facility] gives you secure access to your electronic health record. If you see a primary care provider, you can also send messages to your care team and make appointments. If you have questions, please call your primary care clinic.  If you do not have a primary care provider, please call [phone] and they will assist you.        Care EveryWhere ID     This is your Care EveryWhere ID. This could be used by other organizations to access your [facility] medical records  [id number]        Your Vitals Were     Pulse Temperature Respirations Pulse Oximetry BMI (Body Mass Index)       73 97.9  F (36.6  C) (Tympanic) 18 97% 22.24 kg/m2        Blood Pressure from Last 3 Encounters:   09/28/17 107/73   09/07/17 106/72   08/21/17 114/72    Weight from Last 3 Encounters:   09/28/17 137 lb 12.8 oz (62.5 kg)   09/07/17 135 lb (61.2 kg)   08/21/17 136 lb 6 oz (61.9 kg)              We Performed the Following     CBC with platelets and differential     Glucose     Lipid Profile     OTOLARYNGOLOGY REFERRAL        Primary Care Provider Office Phone # Fax #    Adriana Sales [phone] [address] [place] [address]        Equal Access to Services     JESUS RILEY : Hadii aad ku hadasho Soomaali, waaxda luqadaha, qaybta kaalmada red, teena bliss . So [facility] [phone].    ATENCIÓN: Si habla español, tiene a allen disposición servicios gratuitos de asistencia lingüística. Destini al [phone].    We comply with applicable federal civil rights laws and [place] laws. We do not discriminate on the basis of race, color, national origin, age, disability sex, sexual orientation or gender identity.            Thank you!     Thank you for choosing [facility]  for your care. Our goal is always to provide  you with excellent care. Hearing back from our patients is one way we can continue to improve our services. Please take a few minutes to complete the written survey that you may receive in the mail after your visit with us. Thank you!             Your Updated Medication List - Protect others around you: Learn how to safely use, store and throw away your medicines at www.disposemymeds.org.          This list is accurate as of: 9/28/17  9:32 AM.  Always use your most recent med list.                   Brand Name Dispense Instructions for use Diagnosis    FIRST-MOUTHWASH BLM MT Susp compounding kit     237 mL    Take 5-10 mLs by mouth 3 times daily as needed for mouth sores    Acute gastritis, presence of bleeding unspecified, unspecified gastritis type       omeprazole 40 MG capsule    priLOSEC    60 capsule    Take 1 capsule (40 mg) by mouth 2 times daily (before meals) Take 30-60 minutes before a meal.    PUD (peptic ulcer disease)

## 2017-09-28 NOTE — NURSING NOTE
"Chief Complaint   Patient presents with     Physical       Initial /73 (BP Location: Right arm, Patient Position: Sitting, Cuff Size: Adult Regular)  Pulse 73  Temp 97.9  F (36.6  C) (Tympanic)  Resp 18  Wt 137 lb 12.8 oz (62.5 kg)  SpO2 97%  BMI 22.24 kg/m2 Estimated body mass index is 22.24 kg/(m^2) as calculated from the following:    Height as of 4/17/17: 5' 6\" (1.676 m).    Weight as of this encounter: 137 lb 12.8 oz (62.5 kg).  Medication Reconciliation: complete         hSelia Parikh MA      "

## 2018-03-09 ENCOUNTER — OFFICE VISIT (OUTPATIENT)
Dept: PEDIATRICS | Facility: CLINIC | Age: 33
End: 2018-03-09
Payer: COMMERCIAL

## 2018-03-09 VITALS
OXYGEN SATURATION: 100 % | HEART RATE: 63 BPM | TEMPERATURE: 98.6 F | BODY MASS INDEX: 25.01 KG/M2 | SYSTOLIC BLOOD PRESSURE: 106 MMHG | DIASTOLIC BLOOD PRESSURE: 62 MMHG | WEIGHT: 155.6 LBS | HEIGHT: 66 IN

## 2018-03-09 DIAGNOSIS — M25.562 ARTHRALGIA OF BOTH KNEES: Primary | ICD-10-CM

## 2018-03-09 DIAGNOSIS — L98.9 HAND LESION: ICD-10-CM

## 2018-03-09 DIAGNOSIS — M25.561 ARTHRALGIA OF BOTH KNEES: Primary | ICD-10-CM

## 2018-03-09 LAB
BASOPHILS # BLD AUTO: 0 10E9/L (ref 0–0.2)
BASOPHILS NFR BLD AUTO: 0.1 %
DIFFERENTIAL METHOD BLD: NORMAL
EOSINOPHIL # BLD AUTO: 0.1 10E9/L (ref 0–0.7)
EOSINOPHIL NFR BLD AUTO: 1.6 %
ERYTHROCYTE [DISTWIDTH] IN BLOOD BY AUTOMATED COUNT: 12.3 % (ref 10–15)
ERYTHROCYTE [SEDIMENTATION RATE] IN BLOOD BY WESTERGREN METHOD: 5 MM/H (ref 0–20)
HCT VFR BLD AUTO: 41.5 % (ref 35–47)
HGB BLD-MCNC: 13.5 G/DL (ref 11.7–15.7)
LYMPHOCYTES # BLD AUTO: 2.8 10E9/L (ref 0.8–5.3)
LYMPHOCYTES NFR BLD AUTO: 41.5 %
MCH RBC QN AUTO: 28.4 PG (ref 26.5–33)
MCHC RBC AUTO-ENTMCNC: 32.5 G/DL (ref 31.5–36.5)
MCV RBC AUTO: 87 FL (ref 78–100)
MONOCYTES # BLD AUTO: 0.4 10E9/L (ref 0–1.3)
MONOCYTES NFR BLD AUTO: 6.5 %
NEUTROPHILS # BLD AUTO: 3.4 10E9/L (ref 1.6–8.3)
NEUTROPHILS NFR BLD AUTO: 50.3 %
PLATELET # BLD AUTO: 243 10E9/L (ref 150–450)
RBC # BLD AUTO: 4.76 10E12/L (ref 3.8–5.2)
WBC # BLD AUTO: 6.7 10E9/L (ref 4–11)

## 2018-03-09 PROCEDURE — 36415 COLL VENOUS BLD VENIPUNCTURE: CPT | Performed by: PHYSICIAN ASSISTANT

## 2018-03-09 PROCEDURE — 85652 RBC SED RATE AUTOMATED: CPT | Performed by: PHYSICIAN ASSISTANT

## 2018-03-09 PROCEDURE — 86060 ANTISTREPTOLYSIN O TITER: CPT | Performed by: PHYSICIAN ASSISTANT

## 2018-03-09 PROCEDURE — 85025 COMPLETE CBC W/AUTO DIFF WBC: CPT | Performed by: PHYSICIAN ASSISTANT

## 2018-03-09 PROCEDURE — 86140 C-REACTIVE PROTEIN: CPT | Performed by: PHYSICIAN ASSISTANT

## 2018-03-09 PROCEDURE — 80053 COMPREHEN METABOLIC PANEL: CPT | Performed by: PHYSICIAN ASSISTANT

## 2018-03-09 PROCEDURE — 99214 OFFICE O/P EST MOD 30 MIN: CPT | Performed by: PHYSICIAN ASSISTANT

## 2018-03-09 RX ORDER — TRAMADOL HYDROCHLORIDE 50 MG/1
50 TABLET ORAL EVERY 6 HOURS PRN
Qty: 15 TABLET | Refills: 0 | Status: SHIPPED | OUTPATIENT
Start: 2018-03-09 | End: 2019-05-22

## 2018-03-09 NOTE — MR AVS SNAPSHOT
After Visit Summary   3/9/2018    Kathya Ac    MRN: [id number]           Patient Information     Date Of Birth          1985        Visit Information        Provider Department      3/9/2018 1:30 PM Andrade Morrison PA-C [facility]        Today's Diagnoses     Arthralgia of both knees    -  1    Hand lesion           Follow-ups after your visit        Additional Services     RHEUMATOLOGY REFERRAL       Your provider has referred you to: [facility]:  [facility]   [phone] [email or web address]    Please be aware that coverage of these services is subject to the terms and limitations of your health insurance plan.  Call member services at your health plan with any benefit or coverage questions.      Please bring the following with you to your appointment:    (1) Any X-Rays, CTs or MRIs which have been performed.  Contact the facility where they were done to arrange for  prior to your scheduled appointment.    (2) List of current medications   (3) This referral request   (4) Any documents/labs given to you for this referral                  Your next 10 appointments already scheduled     Apr 30, 2018  8:30 AM CDT   New Visit with Ar Whitney MD   [facility] ([facility])    [address] [address] [phone]              Who to contact     If you have questions or need follow up information about today's clinic visit or your schedule please contact [facility] directly at [phone].  Normal or non-critical lab and imaging results will be communicated to you by MyChart, letter or phone within 4 business days after the clinic has received the results. If you do not hear from us within 7 days, please contact the clinic through MyChart or phone. If you have a critical or abnormal lab result, we will notify you by phone as soon as possible.  Submit refill  "requests through [employer] or call your pharmacy and they will forward the refill request to us. Please allow 3 business days for your refill to be completed.          Additional Information About Your Visit        Cura TV[employer] Information     [employer] gives you secure access to your electronic health record. If you see a primary care provider, you can also send messages to your care team and make appointments. If you have questions, please call your primary care clinic.  If you do not have a primary care provider, please call [phone] and they will assist you.        Care EveryWhere ID     This is your Care EveryWhere ID. This could be used by other organizations to access your [place] medical records  [id number]        Your Vitals Were     Pulse Temperature Height Pulse Oximetry BMI (Body Mass Index)       63 98.6  F (37  C) (Oral) 5' 6\" (1.676 m) 100% 25.11 kg/m2        Blood Pressure from Last 3 Encounters:   03/09/18 106/62   09/28/17 107/73   09/07/17 106/72    Weight from Last 3 Encounters:   03/09/18 155 lb 9.6 oz (70.6 kg)   09/28/17 137 lb 12.8 oz (62.5 kg)   09/07/17 135 lb (61.2 kg)              We Performed the Following     Antistreptolysin O     CBC with platelets differential     Comprehensive metabolic panel     CRP inflammation     Erythrocyte sedimentation rate auto     RHEUMATOLOGY REFERRAL          Today's Medication Changes          These changes are accurate as of 3/9/18  4:12 PM.  If you have any questions, ask your nurse or doctor.               Start taking these medicines.        Dose/Directions    traMADol 50 MG tablet   Commonly known as:  ULTRAM   Used for:  Arthralgia of both knees   Started by:  Andrade Morrison PA-C        Dose:  50 mg   Take 1 tablet (50 mg) by mouth every 6 hours as needed for moderate pain   Quantity:  15 tablet   Refills:  0            Where to get your medicines      Some of these will need a paper prescription and others can be bought over the " counter.  Ask your nurse if you have questions.     Bring a paper prescription for each of these medications     traMADol 50 MG tablet                Primary Care Provider Office Phone # Fax #    Adriana Sales -[phone] [address]  [place] [address]        Equal Access to Services     THIAGO RILEY : Hadii aad ku hadasho Soomaali, waaxda luqadaha, qaybta kaalmada adeegyada, waxeren leivaezequielsilvino nunez. So [facility] [phone].    ATENCIÓN: Si habla español, tiene a allen disposición servicios gratuitos de asistencia lingüística. [facility] [phone].    We comply with applicable federal civil rights laws and [place] laws. We do not discriminate on the basis of race, color, national origin, age, disability, sex, sexual orientation, or gender identity.            Thank you!     Thank you for choosing [facility]  for your care. Our goal is always to provide you with excellent care. Hearing back from our patients is one way we can continue to improve our services. Please take a few minutes to complete the written survey that you may receive in the mail after your visit with us. Thank you!             Your Updated Medication List - Protect others around you: Learn how to safely use, store and throw away your medicines at www.disposemymeds.org.          This list is accurate as of 3/9/18  4:12 PM.  Always use your most recent med list.                   Brand Name Dispense Instructions for use Diagnosis    omeprazole 40 MG capsule    priLOSEC    60 capsule    Take 1 capsule (40 mg) by mouth 2 times daily (before meals) Take 30-60 minutes before a meal.    PUD (peptic ulcer disease)       traMADol 50 MG tablet    ULTRAM    15 tablet    Take 1 tablet (50 mg) by mouth every 6 hours as needed for moderate pain    Arthralgia of both knees

## 2018-03-09 NOTE — PROGRESS NOTES
"  SUBJECTIVE:   Kathya Ac is a 32 year old female who presents to clinic today for the following health issues:    Joint Pain    Onset: on/off several years    Description:   Location: bilateral knees  Character: Sharp and Dull ache    Intensity: moderate    Progression of Symptoms: worse    Accompanying Signs & Symptoms:  Other symptoms: blisters on hands/painful and occurs with exacerbation of knee pain    History:   Previous similar pain: YES--episodes occurs at least a few times yearly    Precipitating factors:   Trauma or overuse: no     Alleviating factors:  Improved by: nothing  Therapies Tried and outcome: none    Patient seen and evaluated by Rheum in [place] approximately one year ago.   Entire panel of labs completed and xrays of knees NEGATIVE.     History of psoriasis.   History of gastric ulcers due to ibuprofen use.  PMD.     ROS:  CONSTITUTIONAL: NEGATIVE for fever, chills, recent illnesses  ENT/MOUTH: NEGATIVE for ear, mouth and throat problems  RESP: NEGATIVE for significant cough or SOB  CV: NEGATIVE for chest pain, palpitations  GI: NEGATIVE for nausea, abdominal pain  MUSCULOSKELETAL: NEGATIVE for significant arthralgias or myalgia  NEURO: NEGATIVE for weakness, dizziness or paresthesias    OBJECTIVE:                                                    /62 (BP Location: Right arm, Cuff Size: Adult Regular)  Pulse 63  Temp 98.6  F (37  C) (Oral)  Ht 5' 6\" (1.676 m)  Wt 155 lb 9.6 oz (70.6 kg)  SpO2 100%  BMI 25.11 kg/m2  Body mass index is 25.11 kg/(m^2).   GENERAL: alert, no distress  Eyes:  Eyes grossly normal to inspection, PERRL and conjunctivae and sclerae normal  HENT: Mouth- no ulcers, no lesions  NECK: no tenderness, no adenopathy  RESP: lungs clear to auscultation - no rales, no rhonchi, no wheezes  CV: regular rates and rhythm, normal S1 S2, no S3 or S4 and no murmur, no click or rub  ABDOMEN: soft, no tenderness  SKIN:inspection of the right palmar aspect of hand reveals " multiple raised skin colored lesions     Diagnostic test results:  Results for orders placed or performed in visit on 03/09/18 (from the past 24 hour(s))   CBC with platelets differential   Result Value Ref Range    WBC 6.7 4.0 - 11.0 10e9/L    RBC Count 4.76 3.8 - 5.2 10e12/L    Hemoglobin 13.5 11.7 - 15.7 g/dL    Hematocrit 41.5 35.0 - 47.0 %    MCV 87 78 - 100 fl    MCH 28.4 26.5 - 33.0 pg    MCHC 32.5 31.5 - 36.5 g/dL    RDW 12.3 10.0 - 15.0 %    Platelet Count 243 150 - 450 10e9/L    Diff Method Automated Method     % Neutrophils 50.3 %    % Lymphocytes 41.5 %    % Monocytes 6.5 %    % Eosinophils 1.6 %    % Basophils 0.1 %    Absolute Neutrophil 3.4 1.6 - 8.3 10e9/L    Absolute Lymphocytes 2.8 0.8 - 5.3 10e9/L    Absolute Monocytes 0.4 0.0 - 1.3 10e9/L    Absolute Eosinophils 0.1 0.0 - 0.7 10e9/L    Absolute Basophils 0.0 0.0 - 0.2 10e9/L   Erythrocyte sedimentation rate auto   Result Value Ref Range    Sed Rate 5 0 - 20 mm/h        ASSESSMENT/PLAN:                                                    (M25.561,  M25.562) Arthralgia of both knees  (primary encounter diagnosis)  Comment: proceed with labs and rheum consultation. Patient given small quantity of pain medication to help relieve symptoms.   Plan: Comprehensive metabolic panel, CBC with         platelets differential, CRP inflammation,         Erythrocyte sedimentation rate auto,         Antistreptolysin O, traMADol (ULTRAM) 50 MG         tablet, RHEUMATOLOGY REFERRAL            (L98.9) Hand lesion  Comment:   Plan:     See Patient Instructions    Andrade Morrison PA-C  [facility]

## 2018-03-10 LAB
ALBUMIN SERPL-MCNC: 4 G/DL (ref 3.4–5)
ALP SERPL-CCNC: 48 U/L (ref 40–150)
ALT SERPL W P-5'-P-CCNC: 13 U/L (ref 0–50)
ANION GAP SERPL CALCULATED.3IONS-SCNC: 6 MMOL/L (ref 3–14)
AST SERPL W P-5'-P-CCNC: 14 U/L (ref 0–45)
BILIRUB SERPL-MCNC: 0.4 MG/DL (ref 0.2–1.3)
BUN SERPL-MCNC: 7 MG/DL (ref 7–30)
CALCIUM SERPL-MCNC: 8.4 MG/DL (ref 8.5–10.1)
CHLORIDE SERPL-SCNC: 108 MMOL/L (ref 94–109)
CO2 SERPL-SCNC: 28 MMOL/L (ref 20–32)
CREAT SERPL-MCNC: 0.81 MG/DL (ref 0.52–1.04)
CRP SERPL-MCNC: <2.9 MG/L (ref 0–8)
GFR SERPL CREATININE-BSD FRML MDRD: 82 ML/MIN/1.7M2
GLUCOSE SERPL-MCNC: 68 MG/DL (ref 70–99)
POTASSIUM SERPL-SCNC: 3.8 MMOL/L (ref 3.4–5.3)
PROT SERPL-MCNC: 7.1 G/DL (ref 6.8–8.8)
SODIUM SERPL-SCNC: 142 MMOL/L (ref 133–144)

## 2018-03-12 LAB — ASO AB SERPL-ACNC: <25 IU/ML (ref 0–120)

## 2018-09-11 ENCOUNTER — NURSE TRIAGE (OUTPATIENT)
Dept: NURSING | Facility: CLINIC | Age: 33
End: 2018-09-11

## 2018-09-11 ENCOUNTER — OFFICE VISIT (OUTPATIENT)
Dept: FAMILY MEDICINE | Facility: CLINIC | Age: 33
End: 2018-09-11
Payer: COMMERCIAL

## 2018-09-11 VITALS
TEMPERATURE: 98.4 F | RESPIRATION RATE: 16 BRPM | OXYGEN SATURATION: 97 % | HEIGHT: 66 IN | SYSTOLIC BLOOD PRESSURE: 99 MMHG | WEIGHT: 159 LBS | BODY MASS INDEX: 25.55 KG/M2 | HEART RATE: 78 BPM | DIASTOLIC BLOOD PRESSURE: 67 MMHG

## 2018-09-11 DIAGNOSIS — R19.7 DIARRHEA, UNSPECIFIED TYPE: Primary | ICD-10-CM

## 2018-09-11 LAB
BASOPHILS # BLD AUTO: 0 10E9/L (ref 0–0.2)
BASOPHILS NFR BLD AUTO: 0.3 %
DIFFERENTIAL METHOD BLD: NORMAL
EOSINOPHIL # BLD AUTO: 0.1 10E9/L (ref 0–0.7)
EOSINOPHIL NFR BLD AUTO: 1.3 %
ERYTHROCYTE [DISTWIDTH] IN BLOOD BY AUTOMATED COUNT: 13 % (ref 10–15)
HCT VFR BLD AUTO: 38.7 % (ref 35–47)
HGB BLD-MCNC: 12.5 G/DL (ref 11.7–15.7)
LYMPHOCYTES # BLD AUTO: 2.1 10E9/L (ref 0.8–5.3)
LYMPHOCYTES NFR BLD AUTO: 34.1 %
MCH RBC QN AUTO: 27.7 PG (ref 26.5–33)
MCHC RBC AUTO-ENTMCNC: 32.3 G/DL (ref 31.5–36.5)
MCV RBC AUTO: 86 FL (ref 78–100)
MONOCYTES # BLD AUTO: 0.5 10E9/L (ref 0–1.3)
MONOCYTES NFR BLD AUTO: 7.8 %
NEUTROPHILS # BLD AUTO: 3.4 10E9/L (ref 1.6–8.3)
NEUTROPHILS NFR BLD AUTO: 56.5 %
PLATELET # BLD AUTO: 273 10E9/L (ref 150–450)
RBC # BLD AUTO: 4.51 10E12/L (ref 3.8–5.2)
WBC # BLD AUTO: 6 10E9/L (ref 4–11)

## 2018-09-11 PROCEDURE — 36415 COLL VENOUS BLD VENIPUNCTURE: CPT | Performed by: NURSE PRACTITIONER

## 2018-09-11 PROCEDURE — 99213 OFFICE O/P EST LOW 20 MIN: CPT | Performed by: NURSE PRACTITIONER

## 2018-09-11 PROCEDURE — 85025 COMPLETE CBC W/AUTO DIFF WBC: CPT | Performed by: NURSE PRACTITIONER

## 2018-09-11 PROCEDURE — 80048 BASIC METABOLIC PNL TOTAL CA: CPT | Performed by: NURSE PRACTITIONER

## 2018-09-11 PROCEDURE — 87506 IADNA-DNA/RNA PROBE TQ 6-11: CPT | Performed by: NURSE PRACTITIONER

## 2018-09-11 PROCEDURE — 87177 OVA AND PARASITES SMEARS: CPT | Performed by: NURSE PRACTITIONER

## 2018-09-11 PROCEDURE — 87209 SMEAR COMPLEX STAIN: CPT | Performed by: NURSE PRACTITIONER

## 2018-09-11 NOTE — PROGRESS NOTES
"  SUBJECTIVE:   Kathya Ac is a 32 year old female who presents to clinic today for the following health issues:      \"On the first of September I got hit with lightheadedness.\" Symptoms starting 11 days ago with loose stools, abdominal pain, bloating. Reporting pain in mid upper abd today \"constant dull\" ache. \"Crampy.\" Reporting a couple of small loose to formed soft stools per day. Abdominal bloating. Intermittent chills. Symptoms waking from sleep during night.  Patient has tried antacids with no relief.  Rating current pain as \"mild.\" Intermittent nausea. Has tried imodium, tums and pepto bismol..       As above.  Kathya reports that when her symptoms started it seemed \"viral.\"  Her symptoms have not gone away.  Now her diarrhea stools are \"multiple times a day.\"  She has had 4 loose stools today so far.  No fever.  No vomiting.  No blood in her stool.  She is eating a regular diet.    Intermittent episodes of feeling lightheaded, dizzy, and nausea.    Gastritis last fall and she took omeprazole.  She is not taking omeprazole daily at this time.      Rheumatology:  Pain in joints, knees, and blisters in hands and mouth that corresond with the pain.  Her plan is to go back to RHeumatology.     Problem list and histories reviewed & adjusted, as indicated.  Additional history: as documented    Patient Active Problem List   Diagnosis     CARDIOVASCULAR SCREENING; LDL GOAL LESS THAN 160     Psoriasis     Migraine with aura and without status migrainosus, not intractable     Endometriosis     No past surgical history on file.    Social History   Substance Use Topics     Smoking status: Never Smoker     Smokeless tobacco: Never Used     Alcohol use No     Family History   Problem Relation Age of Onset     Family History Negative Mother      colon polyps     Chronic Obstructive Pulmonary Disease Father      Hypertension Father      Prostate Cancer Father 56     Alzheimer Disease Maternal Grandmother      Melanoma " "Maternal Grandmother      Colon Cancer Maternal Grandfather      Melanoma Maternal Aunt          Current Outpatient Prescriptions   Medication Sig Dispense Refill     omeprazole (PRILOSEC) 40 MG capsule Take 1 capsule (40 mg) by mouth 2 times daily (before meals) Take 30-60 minutes before a meal. (Patient not taking: Reported on 9/11/2018) 60 capsule 1     traMADol (ULTRAM) 50 MG tablet Take 1 tablet (50 mg) by mouth every 6 hours as needed for moderate pain (Patient not taking: Reported on 9/11/2018) 15 tablet 0     No Known Allergies  Recent Labs   Lab Test  03/09/18   1403  09/28/17   0939  08/21/17   1436  04/17/17   1333  04/15/17   1530   LDL   --   59   --    --    --    HDL   --   74   --    --    --    TRIG   --   29   --    --    --    ALT  13   --    --   12  15   CR  0.81   --    --   0.97  1.10*   GFRESTIMATED  82   --    --   68  59*   GFRESTBLACK  >90   --    --   82  71   POTASSIUM  3.8   --    --   4.3  4.2   TSH   --    --   2.34   --    --       BP Readings from Last 3 Encounters:   09/11/18 99/67   03/09/18 106/62   09/28/17 107/73    Wt Readings from Last 3 Encounters:   09/11/18 159 lb (72.1 kg)   03/09/18 155 lb 9.6 oz (70.6 kg)   09/28/17 137 lb 12.8 oz (62.5 kg)           Labs reviewed in EPIC    Reviewed and updated as needed this visit by clinical staff       Reviewed and updated as needed this visit by Provider         ROS:  Constitutional, HEENT, cardiovascular, pulmonary, GI, , musculoskeletal, neuro, skin, endocrine and psych systems are negative, except as otherwise noted.    OBJECTIVE:     BP 99/67  Pulse 78  Temp 98.4  F (36.9  C)  Resp 16  Ht 5' 6\" (1.676 m)  Wt 159 lb (72.1 kg)  LMP 08/27/2018  SpO2 97%  Breastfeeding? No  BMI 25.66 kg/m2  Body mass index is 25.66 kg/(m^2).  GENERAL: healthy, alert and no distress  EYES: Eyes grossly normal to inspection, PERRL and conjunctivae and sclerae normal  HENT: ear canals and TM's normal, nose and mouth without ulcers or " lesions  NECK: no adenopathy, no asymmetry, masses, or scars and thyroid normal to palpation  RESP: lungs clear to auscultation - no rales, rhonchi or wheezes  CV: regular rate and rhythm, normal S1 S2, no S3 or S4, no murmur, click or rub, no peripheral edema and peripheral pulses strong  ABDOMEN: soft, nontender, no hepatosplenomegaly, no masses and bowel sounds normal  SKIN: warm and dry  NEURO: Normal strength and tone, mentation intact and speech normal  PSYCH: mentation appears normal, affect normal/bright    Diagnostic Test Results:  Ordered/pending    ASSESSMENT/PLAN:     (R19.7) Diarrhea, unspecified type  (primary encounter diagnosis)  Comment:   Plan: CBC with platelets and differential, Basic         metabolic panel, Ova and Parasite Exam Routine,        Enteric Bacteria and Virus Panel by HERBETR Stool          Patient Instructions   Add flogen 3 one tablet per day for now.  Continue a BRATY diet: bananas, rice/pasta, applesauce, toast/bread, yogurt.  Clear fluids: water, gatorade, powerade.  Watch your symptoms.  If your symptoms are not back to normal within a few weeks, please be re-evaluated.      Treating Diarrhea    Diarrhea happens when you have loose, watery, or frequent bowel movements. It is a common problem with many causes. Most cases of diarrhea clear up on their own. But certain cases may need treatment. Be sure to see your healthcare provider if your symptoms do not improve within a few days.  Getting relief  Treatment of diarrhea depends on its cause. Diarrhea caused by bacterial or parasite infection is often treated with antibiotics. Diarrhea caused by other factors, such as a stomach virus, often improves with simple home treatment. The tips below may also help relieve your symptoms.    Drink plenty of fluids. This helps prevent too much fluid loss (dehydration). Water, clear soups, and electrolyte solutions are good choices. Avoid alcohol, coffee, tea, and milk. These can irritate your  intestines and make symptoms worse.    Suck on ice chips if drinking makes you queasy.    Return to your normal diet slowly. You may want to eat bland foods at first, such as rice and toast. Also, you may need to avoid certain foods for a while, such as dairy products. These can make symptoms worse. Ask your healthcare provider if there are any other foods you should avoid.    If you were prescribed antibiotics, take them as directed.    Do not take anti-diarrhea medicines without asking your healthcare provider first.  Call your healthcare provider   Call your healthcare provider if you have any of the following:     A fever of 100.4 F (38.0 C) or higher, or as directed by your healthcare provider    Severe pain    Worsening diarrhea or diarrhea for more than 2 days    Bloody vomit or stool    Signs of dehydration (dizziness, dry mouth and tongue, rapid pulse, dark urine)  Date Last Reviewed: 7/1/2016 2000-2017 [employer]. [address] [place] [address], [place] [address]. All rights reserved. This information is not intended as a substitute for professional medical care. Always follow your healthcare professional's instructions.                JENIFER Arzola [facility]

## 2018-09-11 NOTE — MR AVS SNAPSHOT
After Visit Summary   9/11/2018    Kathya Ac    MRN: [id number]           Patient Information     Date Of Birth          1985        Visit Information        Provider Department      9/11/2018 3:40 PM Arianna Bartholomew APRN [facility]        Today's Diagnoses     Diarrhea, unspecified type    -  1      Care Instructions    Add flogen 3 one tablet per day for now.  Continue a BRATY diet: bananas, rice/pasta, applesauce, toast/bread, yogurt.  Clear fluids: water, gatorade, powerade.  Watch your symptoms.  If your symptoms are not back to normal within a few weeks, please be re-evaluated.      Treating Diarrhea    Diarrhea happens when you have loose, watery, or frequent bowel movements. It is a common problem with many causes. Most cases of diarrhea clear up on their own. But certain cases may need treatment. Be sure to see your healthcare provider if your symptoms do not improve within a few days.  Getting relief  Treatment of diarrhea depends on its cause. Diarrhea caused by bacterial or parasite infection is often treated with antibiotics. Diarrhea caused by other factors, such as a stomach virus, often improves with simple home treatment. The tips below may also help relieve your symptoms.    Drink plenty of fluids. This helps prevent too much fluid loss (dehydration). Water, clear soups, and electrolyte solutions are good choices. Avoid alcohol, coffee, tea, and milk. These can irritate your intestines and make symptoms worse.    Suck on ice chips if drinking makes you queasy.    Return to your normal diet slowly. You may want to eat bland foods at first, such as rice and toast. Also, you may need to avoid certain foods for a while, such as dairy products. These can make symptoms worse. Ask your healthcare provider if there are any other foods you should avoid.    If you were prescribed antibiotics, take them as directed.    Do not take anti-diarrhea  medicines without asking your healthcare provider first.  Call your healthcare provider   Call your healthcare provider if you have any of the following:     A fever of 100.4 F (38.0 C) or higher, or as directed by your healthcare provider    Severe pain    Worsening diarrhea or diarrhea for more than 2 days    Bloody vomit or stool    Signs of dehydration (dizziness, dry mouth and tongue, rapid pulse, dark urine)  Date Last Reviewed: 7/1/2016 2000-2017 [employer]. [address] [place] [address]. All rights reserved. This information is not intended as a substitute for professional medical care. Always follow your healthcare professional's instructions.                Follow-ups after your visit        Your next 10 appointments already scheduled     Sep 12, 2018 11:00 AM CDT   Office Visit with Adriana Sales MD   [facility] ([facility])    [address] [place] [address] [phone]           Bring a current list of meds and any records pertaining to this visit. For Physicals, please bring immunization records and any forms needing to be filled out. Please arrive 10 minutes early to complete paperwork.              Future tests that were ordered for you today     Open Future Orders        Priority Expected Expires Ordered    Ova and Parasite Exam Routine Routine  9/11/2019 9/11/2018    Enteric Bacteria and Virus Panel by HERBERT Stool Routine  9/11/2019 9/11/2018            Who to contact     If you have questions or need follow up information about today's clinic visit or your schedule please contact [facility] directly at [phone].  Normal or non-critical lab and imaging results will be communicated to you by MyChart, letter or phone within 4 business days after the clinic has received the results. If you do not hear from us within 7 days, please contact the clinic through MyChart or phone. If you have a  "critical or abnormal lab result, we will notify you by phone as soon as possible.  Submit refill requests through [employer] or call your pharmacy and they will forward the refill request to us. Please allow 3 business days for your refill to be completed.          Additional Information About Your Visit        [employer] Information     [employer] gives you secure access to your electronic health record. If you see a primary care provider, you can also send messages to your care team and make appointments. If you have questions, please call your primary care clinic.  If you do not have a primary care provider, please call [phone] and they will assist you.        Care EveryWhere ID     This is your Care EveryWhere ID. This could be used by other organizations to access your [place] medical records  [id number]        Your Vitals Were     Pulse Temperature Respirations Height Last Period Pulse Oximetry    78 98.4  F (36.9  C) 16 5' 6\" (1.676 m) 08/27/2018 97%    Breastfeeding? BMI (Body Mass Index)                No 25.66 kg/m2           Blood Pressure from Last 3 Encounters:   09/11/18 99/67   03/09/18 106/62   09/28/17 107/73    Weight from Last 3 Encounters:   09/11/18 159 lb (72.1 kg)   03/09/18 155 lb 9.6 oz (70.6 kg)   09/28/17 137 lb 12.8 oz (62.5 kg)              We Performed the Following     Basic metabolic panel     CBC with platelets and differential        Primary Care Provider Office Phone # Fax #    Adriana Sales [phone]       [address] [place] [address]        Equal Access to Services     [facility]: Hadii aad ku hadasho Soomaali, waaxda luqadaha, qaybta kaalmada teena moon . So [facility] [phone].    ATENCIÓN: Si habla español, tiene a allen disposición servicios gratuitos de asistencia lingüística. Llame al [phone].    We comply with applicable federal civil rights laws and [place] laws. We do not discriminate on " the basis of race, color, national origin, age, disability, sex, sexual orientation, or gender identity.            Thank you!     Thank you for choosing [facility]  for your care. Our goal is always to provide you with excellent care. Hearing back from our patients is one way we can continue to improve our services. Please take a few minutes to complete the written survey that you may receive in the mail after your visit with us. Thank you!             Your Updated Medication List - Protect others around you: Learn how to safely use, store and throw away your medicines at www.disposemymeds.org.          This list is accurate as of 9/11/18  4:08 PM.  Always use your most recent med list.                   Brand Name Dispense Instructions for use Diagnosis    omeprazole 40 MG capsule    priLOSEC    60 capsule    Take 1 capsule (40 mg) by mouth 2 times daily (before meals) Take 30-60 minutes before a meal.    PUD (peptic ulcer disease)       traMADol 50 MG tablet    ULTRAM    15 tablet    Take 1 tablet (50 mg) by mouth every 6 hours as needed for moderate pain    Arthralgia of both knees

## 2018-09-11 NOTE — TELEPHONE ENCOUNTER
"\"On the first of September I got hit with lightheadedness.\" Symptoms starting 11 days ago with loose stools, abdominal pain, bloating. Reporting pain in mid upper abd today \"constant dull\" ache. \"Crampy.\" Reporting a couple of small loose to formed soft stools per day. Abdominal bloating. Intermittent chills. Symptoms waking from sleep during night.  Patient has tried antacids with no relief.  Rating current pain as \"mild.\" Intermittent nausea  Per guidelines advised to be seen with in 4 hours. Caller verbalized understanding. Denies further questions.    Alondra Tran RN  [facility]        .Reason for Disposition    [1] MILD-MODERATE pain AND [2] constant AND [3] present > 2 hours    Additional Information    Negative: Severe difficulty breathing (e.g., struggling for each breath, speaks in single words)    Negative: Shock suspected (e.g., cold/pale/clammy skin, too weak to stand, low BP, rapid pulse)    Negative: Difficult to awaken or acting confused  (e.g., disoriented, slurred speech)    Negative: Passed out (i.e., lost consciousness, collapsed and was not responding)    Negative: Visible sweat on face or sweat dripping down face    Negative: Sounds like a life-threatening emergency to the triager    Negative: Followed an abdomen (stomach) injury    Negative: Chest pain    Negative: [1] SEVERE pain (e.g., excruciating) AND [2] present > 1 hour    Negative: [1] Pain lasts > 10 minutes AND [2] age > 50    Negative: [1] Pain lasts > 10 minutes AND [2] age > 40 AND [3] associated chest, arm, neck, upper back or jaw pain    Negative: [1] Pain lasts > 10 minutes AND [2] age > 35 AND [3] at least one cardiac risk factor (i.e., hypertension, diabetes, obesity, smoker or strong family history of heart disease)    Negative: [1] Pain lasts > 10 minutes AND [2] history of heart disease (i.e., heart attack, bypass surgery, angina, angioplasty, CHF; not just a heart murmur)    Negative: [1] Pain lasts > 10 " "minutes AND [2] difficulty breathing    Negative: [1] Vomiting AND [2] contains red blood  (Exception: few streaks and only occurred once)    Negative: [1] Vomiting AND [2] contains black (\"coffee ground\") material    Negative: Blood in bowel movements  (Exception: Blood on surface of BM with constipation)    Negative: Black or tarry bowel movements  (Exception: chronic-unchanged  black-grey bowel movements AND is taking iron pills or Pepto-bismol)    Negative: [1] Pregnant > 24 weeks AND [2] hand or face swelling    Negative: Patient sounds very sick or weak to the triager    Protocols used: ABDOMINAL PAIN - UPPER-ADULT-AH      "

## 2018-09-11 NOTE — PATIENT INSTRUCTIONS
Add flogen 3 one tablet per day for now.  Continue a BRATY diet: bananas, rice/pasta, applesauce, toast/bread, yogurt.  Clear fluids: water, gatorade, powerade.  Watch your symptoms.  If your symptoms are not back to normal within a few weeks, please be re-evaluated.      Treating Diarrhea    Diarrhea happens when you have loose, watery, or frequent bowel movements. It is a common problem with many causes. Most cases of diarrhea clear up on their own. But certain cases may need treatment. Be sure to see your healthcare provider if your symptoms do not improve within a few days.  Getting relief  Treatment of diarrhea depends on its cause. Diarrhea caused by bacterial or parasite infection is often treated with antibiotics. Diarrhea caused by other factors, such as a stomach virus, often improves with simple home treatment. The tips below may also help relieve your symptoms.    Drink plenty of fluids. This helps prevent too much fluid loss (dehydration). Water, clear soups, and electrolyte solutions are good choices. Avoid alcohol, coffee, tea, and milk. These can irritate your intestines and make symptoms worse.    Suck on ice chips if drinking makes you queasy.    Return to your normal diet slowly. You may want to eat bland foods at first, such as rice and toast. Also, you may need to avoid certain foods for a while, such as dairy products. These can make symptoms worse. Ask your healthcare provider if there are any other foods you should avoid.    If you were prescribed antibiotics, take them as directed.    Do not take anti-diarrhea medicines without asking your healthcare provider first.  Call your healthcare provider   Call your healthcare provider if you have any of the following:     A fever of 100.4 F (38.0 C) or higher, or as directed by your healthcare provider    Severe pain    Worsening diarrhea or diarrhea for more than 2 days    Bloody vomit or stool    Signs of dehydration (dizziness, dry mouth and  tongue, rapid pulse, dark urine)  Date Last Reviewed: 7/1/2016 2000-2017 [employer]. [address] [place] [address], [place] [address]. All rights reserved. This information is not intended as a substitute for professional medical care. Always follow your healthcare professional's instructions.

## 2018-09-12 LAB
ANION GAP SERPL CALCULATED.3IONS-SCNC: 10 MMOL/L (ref 3–14)
BUN SERPL-MCNC: 5 MG/DL (ref 7–30)
CALCIUM SERPL-MCNC: 8.1 MG/DL (ref 8.5–10.1)
CHLORIDE SERPL-SCNC: 109 MMOL/L (ref 94–109)
CO2 SERPL-SCNC: 23 MMOL/L (ref 20–32)
CREAT SERPL-MCNC: 0.68 MG/DL (ref 0.52–1.04)
GFR SERPL CREATININE-BSD FRML MDRD: >90 ML/MIN/1.7M2
GLUCOSE SERPL-MCNC: 95 MG/DL (ref 70–99)
O+P STL MICRO: NORMAL
O+P STL MICRO: NORMAL
POTASSIUM SERPL-SCNC: 3.4 MMOL/L (ref 3.4–5.3)
SODIUM SERPL-SCNC: 142 MMOL/L (ref 133–144)
SPECIMEN SOURCE: NORMAL

## 2018-09-14 NOTE — PROGRESS NOTES
Kimnai Cantu,    This note is to let you know the results of your recent lab studies.    Your stool testing came back negative or normal.  There is no sign of an infection.  On your blood test, your CBC is normal.      On your metabolic panel, your electrolytes and blood sugar are normal.  1 of your kidney function studies, the urea nitrogen, came back slightly low at 5.  This is actually concerning when the level is high.  Your calcium level is slightly low.  I do recommend that you eat a diet rich in calcium sources.      I hope by now you are feeling better.  Let me know if there is anything else I can do for you, otherwise I expect things will get better on their own.    Arianna BURGESS CNP    This note was created using the Dragon [employer] Dictating Software and therefore should be read with the understanding of the potential for subtle errors in transcription.

## 2019-03-04 DIAGNOSIS — Z82.79 FAMILY HISTORY OF CHROMOSOMAL ANOMALY: ICD-10-CM

## 2019-03-04 DIAGNOSIS — Z81.8 FAMILY HISTORY OF AUTISM: ICD-10-CM

## 2019-03-04 DIAGNOSIS — Z82.79 FAMILY HISTORY OF CHROMOSOMAL ANOMALY: Primary | ICD-10-CM

## 2019-03-04 PROCEDURE — 36415 COLL VENOUS BLD VENIPUNCTURE: CPT | Performed by: PEDIATRICS

## 2019-03-04 PROCEDURE — 40000891 ZZHCL STATISTIC CGH PARENTAL FOLLOW UP 81228: Performed by: PEDIATRICS

## 2019-03-04 PROCEDURE — 40000803 ZZHCL STATISTIC DNA ISOL HIGH PURITY: Performed by: PEDIATRICS

## 2019-03-14 ENCOUNTER — TELEPHONE (OUTPATIENT)
Dept: CONSULT | Facility: CLINIC | Age: 34
End: 2019-03-14

## 2019-03-14 LAB — COPATH REPORT: NORMAL

## 2019-03-14 NOTE — TELEPHONE ENCOUNTER
I contacted Kathya to discuss the results of her recent genetic testing.  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  Her 's lab appointment will be cancelled as it is no longer needed.  Because Kathya is healthy with no history of developmental or social challenges, we believe this duplication is most likely a benign variant with no effect.  However, we cannot exclude the possibility of variable expression and/or reduced penetrance.  This leaves us without a clear answer for her children's concerns and further testing, specifically an autism panel, may be indicated.  Kathya expressed understanding and agreement with this plan.  On the family's behalf I will submit an insurance authorization for this testing and contact the family again at that time.  We remain available for additional questions or concerns in the meantime.       Argenis Hansen MS [facility]  Genetic Counselor  Division of Genetics and Metabolism

## 2019-05-21 ENCOUNTER — OFFICE VISIT (OUTPATIENT)
Dept: FAMILY MEDICINE | Facility: CLINIC | Age: 34
End: 2019-05-21
Payer: COMMERCIAL

## 2019-05-21 VITALS
RESPIRATION RATE: 16 BRPM | SYSTOLIC BLOOD PRESSURE: 113 MMHG | WEIGHT: 162 LBS | BODY MASS INDEX: 26.15 KG/M2 | HEART RATE: 76 BPM | OXYGEN SATURATION: 99 % | DIASTOLIC BLOOD PRESSURE: 78 MMHG | TEMPERATURE: 98.4 F

## 2019-05-21 DIAGNOSIS — R53.83 FATIGUE, UNSPECIFIED TYPE: ICD-10-CM

## 2019-05-21 DIAGNOSIS — M25.561 ARTHRALGIA OF BOTH KNEES: ICD-10-CM

## 2019-05-21 DIAGNOSIS — F41.9 ANXIETY: Primary | ICD-10-CM

## 2019-05-21 DIAGNOSIS — M25.562 ARTHRALGIA OF BOTH KNEES: ICD-10-CM

## 2019-05-21 DIAGNOSIS — F41.0 PANIC ATTACK: ICD-10-CM

## 2019-05-21 LAB
ALBUMIN SERPL-MCNC: 3.8 G/DL (ref 3.4–5)
ALP SERPL-CCNC: 43 U/L (ref 40–150)
ALT SERPL W P-5'-P-CCNC: 15 U/L (ref 0–50)
ANION GAP SERPL CALCULATED.3IONS-SCNC: 7 MMOL/L (ref 3–14)
AST SERPL W P-5'-P-CCNC: 12 U/L (ref 0–45)
BILIRUB SERPL-MCNC: 0.3 MG/DL (ref 0.2–1.3)
BUN SERPL-MCNC: 11 MG/DL (ref 7–30)
CALCIUM SERPL-MCNC: 9.1 MG/DL (ref 8.5–10.1)
CHLORIDE SERPL-SCNC: 108 MMOL/L (ref 94–109)
CO2 SERPL-SCNC: 26 MMOL/L (ref 20–32)
CREAT SERPL-MCNC: 0.91 MG/DL (ref 0.52–1.04)
ERYTHROCYTE [DISTWIDTH] IN BLOOD BY AUTOMATED COUNT: 14 % (ref 10–15)
GFR SERPL CREATININE-BSD FRML MDRD: 84 ML/MIN/{1.73_M2}
GLUCOSE SERPL-MCNC: 87 MG/DL (ref 70–99)
HCT VFR BLD AUTO: 40.4 % (ref 35–47)
HGB BLD-MCNC: 13.3 G/DL (ref 11.7–15.7)
MCH RBC QN AUTO: 27.9 PG (ref 26.5–33)
MCHC RBC AUTO-ENTMCNC: 32.9 G/DL (ref 31.5–36.5)
MCV RBC AUTO: 85 FL (ref 78–100)
PLATELET # BLD AUTO: 301 10E9/L (ref 150–450)
POTASSIUM SERPL-SCNC: 4 MMOL/L (ref 3.4–5.3)
PROT SERPL-MCNC: 7.4 G/DL (ref 6.8–8.8)
RBC # BLD AUTO: 4.76 10E12/L (ref 3.8–5.2)
SODIUM SERPL-SCNC: 141 MMOL/L (ref 133–144)
T4 FREE SERPL-MCNC: 0.9 NG/DL (ref 0.76–1.46)
TSH SERPL DL<=0.005 MIU/L-ACNC: 1.46 MU/L (ref 0.4–4)
WBC # BLD AUTO: 6.8 10E9/L (ref 4–11)

## 2019-05-21 PROCEDURE — 84439 ASSAY OF FREE THYROXINE: CPT | Performed by: FAMILY MEDICINE

## 2019-05-21 PROCEDURE — 80053 COMPREHEN METABOLIC PANEL: CPT | Performed by: FAMILY MEDICINE

## 2019-05-21 PROCEDURE — 85027 COMPLETE CBC AUTOMATED: CPT | Performed by: FAMILY MEDICINE

## 2019-05-21 PROCEDURE — 84443 ASSAY THYROID STIM HORMONE: CPT | Performed by: FAMILY MEDICINE

## 2019-05-21 PROCEDURE — 99214 OFFICE O/P EST MOD 30 MIN: CPT | Performed by: FAMILY MEDICINE

## 2019-05-21 PROCEDURE — 36415 COLL VENOUS BLD VENIPUNCTURE: CPT | Performed by: FAMILY MEDICINE

## 2019-05-21 RX ORDER — CLONAZEPAM 0.5 MG/1
0.5 TABLET, ORALLY DISINTEGRATING ORAL 2 TIMES DAILY PRN
Qty: 30 TABLET | Refills: 1 | Status: SHIPPED | OUTPATIENT
Start: 2019-05-21 | End: 2020-06-12

## 2019-05-21 ASSESSMENT — ANXIETY QUESTIONNAIRES
5. BEING SO RESTLESS THAT IT IS HARD TO SIT STILL: MORE THAN HALF THE DAYS
7. FEELING AFRAID AS IF SOMETHING AWFUL MIGHT HAPPEN: SEVERAL DAYS
2. NOT BEING ABLE TO STOP OR CONTROL WORRYING: NEARLY EVERY DAY
6. BECOMING EASILY ANNOYED OR IRRITABLE: MORE THAN HALF THE DAYS
7. FEELING AFRAID AS IF SOMETHING AWFUL MIGHT HAPPEN: SEVERAL DAYS
3. WORRYING TOO MUCH ABOUT DIFFERENT THINGS: NEARLY EVERY DAY
1. FEELING NERVOUS, ANXIOUS, OR ON EDGE: MORE THAN HALF THE DAYS
4. TROUBLE RELAXING: MORE THAN HALF THE DAYS
GAD7 TOTAL SCORE: 15
GAD7 TOTAL SCORE: 15

## 2019-05-21 NOTE — PROGRESS NOTES
Subjective     Kathya Ac is a 33 year old female who presents to clinic today for the following health issues:    HPI   Abnormal Mood Symptoms      Duration: ongoing     Description:  Depression: no   Anxiety: YES  Panic attacks:yes       Accompanying signs and symptoms: see PHQ-9 and ZACK scores    History (similar episodes/previous evaluation): pt report she was diagnose with anxiety before and took Paxil (side effects, felt hot and dizzy), zoloft (can't remember) and xanax (was too strong).    Precipitating or alleviating factors: None    Therapies tried and outcome: none    Additional: no energy and fatigue.     Worsening anxiety over the past 5 months; her two children are on the autism spectrum, and she is so busy going to their appointments and family counseling, she hasn't had time for her own.  She is having panic attacks, sometimes out of the blue, with associated dizziness, palpitations.  Afterwards, she feels fatigued.  She is not sleeping well secondary to her anxiety, which compounds her stress.  Things at work are changing, and she finds it more difficult to cope with this.  She requests a prescription for as-needed medication to help her cope with panic attacks and sleep.  See med history above.  No unexpected weight changes, no fevers, chills, no skin/hair/nail changes, no bowel changes.      Bilateral knee pain: she saw a rheumatologist once about two years ago; her serologies were reportedly negative but she had been advised to f/u and has not yet.  She continues to have the same bilateral knee pain and stiffness which is chronic, and she would like to re-consult.  She does have a h/o psoriasis.      {  Patient Active Problem List   Diagnosis     CARDIOVASCULAR SCREENING; LDL GOAL LESS THAN 160     Psoriasis     Migraine with aura and without status migrainosus, not intractable     Endometriosis     History reviewed. No pertinent surgical history.    Social History     Tobacco Use     Smoking  status: Never Smoker     Smokeless tobacco: Never Used   Substance Use Topics     Alcohol use: No     Family History   Problem Relation Age of Onset     Family History Negative Mother         colon polyps     Chronic Obstructive Pulmonary Disease Father      Hypertension Father      Prostate Cancer Father 56     Alzheimer Disease Maternal Grandmother      Melanoma Maternal Grandmother      Colon Cancer Maternal Grandfather      Melanoma Maternal Aunt          Current Outpatient Medications   Medication Sig Dispense Refill     clonazePAM (KLONOPIN) 0.5 MG ODT Take 1 tablet (0.5 mg) by mouth 2 times daily as needed for anxiety 30 tablet 1     omeprazole (PRILOSEC) 40 MG capsule Take 1 capsule (40 mg) by mouth 2 times daily (before meals) Take 30-60 minutes before a meal. 60 capsule 1     No Known Allergies    Reviewed and updated as needed this visit by Provider         Review of Systems   ROS COMP: Constitutional, HEENT, cardiovascular, pulmonary, GI, , musculoskeletal, neuro, skin, endocrine and psych systems are negative, except as otherwise noted.      Objective    /78 (BP Location: Right arm, Patient Position: Sitting, Cuff Size: Adult Regular)   Pulse 76   Temp 98.4  F (36.9  C) (Oral)   Resp 16   Wt 73.5 kg (162 lb)   SpO2 99%   BMI 26.15 kg/m    Body mass index is 26.15 kg/m .  Physical Exam   GENERAL APPEARANCE: healthy, alert and no distress  EYES: Eyes grossly normal to inspection, PERRL and conjunctivae and sclerae normal  HENT: ear canals and TM's normal and nose and mouth without ulcers or lesions  NECK: no adenopathy, no asymmetry, masses, or scars and thyroid normal to palpation  RESP: lungs clear to auscultation - no rales, rhonchi or wheezes  CV: regular rates and rhythm, normal S1 S2, no S3 or S4 and no murmur, click or rub  ABDOMEN: soft, nontender, without hepatosplenomegaly or masses and bowel sounds normal  SKIN: no suspicious lesions or rashes  NEURO: Normal strength and tone,  mentation intact and speech normal  PSYCH: mentation appears normal and affect normal/bright, mood is anxious, normal speech rate and rhythm, normal thought process and content, normal judgement and insight.           Assessment & Plan     1. Anxiety  In part, at least, situational.  Perhaps could consider FMLA to be able to accommodate all her appointments so she can have her own therapy appts as well.  At this time, she requests trying another as needed medication for panic attacks; will choose klonopin rather than xanax; could also consider hydroxyzine.  Advised to start with 1/2 tab to see how it will affect her.  Advised not to drive while taking this medication.  I reviewed the potential for addiction and abuse with this class of medication.  I discussed that monotherapy with benzodiazepines is not usually indicated, and if she is needing to take this medication more than about 1-2 times per week, then an serotonin specific reuptake inhibitor or SNRI should be considered.  If she's not done well on two SSRIs she mentioned, could try venlafaxine or similar, for example.  buspar would be another option.    - clonazePAM (KLONOPIN) 0.5 MG ODT; Take 1 tablet (0.5 mg) by mouth 2 times daily as needed for anxiety  Dispense: 30 tablet; Refill: 1  - TSH  - T4, free  - CBC with platelets    2. Panic attack  As above    3. Fatigue, unspecified type  Most likely secondary to mood and sleep symptoms as above, however, the differential is of course broad, and I will evaluate further for thyroid disease, anemia, and less likely renal or hepatic dysfunction.    - TSH  - T4, free  - CBC with platelets  - Comprehensive metabolic panel    4. Arthralgia of both knees  F/u with rheumatology as advised  - RHEUMATOLOGY REFERRAL           No follow-ups on file.    Adriana Sales MD  [facility]          Answers for HPI/ROS submitted by the patient on 5/21/2019   ZACK 7 TOTAL SCORE: 15

## 2019-05-22 ASSESSMENT — ANXIETY QUESTIONNAIRES: GAD7 TOTAL SCORE: 15

## 2019-12-30 ENCOUNTER — RECORDS - HEALTHEAST (OUTPATIENT)
Dept: GENERAL RADIOLOGY | Facility: CLINIC | Age: 34
End: 2019-12-30

## 2019-12-30 ENCOUNTER — OFFICE VISIT - HEALTHEAST (OUTPATIENT)
Dept: FAMILY MEDICINE | Facility: CLINIC | Age: 34
End: 2019-12-30

## 2019-12-30 DIAGNOSIS — M25.572 PAIN IN JOINT INVOLVING ANKLE AND FOOT, LEFT: ICD-10-CM

## 2019-12-30 DIAGNOSIS — M25.572 PAIN IN LEFT ANKLE AND JOINTS OF LEFT FOOT: ICD-10-CM

## 2019-12-30 DIAGNOSIS — F41.9 ANXIETY: ICD-10-CM

## 2019-12-30 ASSESSMENT — PATIENT HEALTH QUESTIONNAIRE - PHQ9: SUM OF ALL RESPONSES TO PHQ QUESTIONS 1-9: 6

## 2020-03-10 ENCOUNTER — HEALTH MAINTENANCE LETTER (OUTPATIENT)
Age: 35
End: 2020-03-10

## 2020-03-22 ENCOUNTER — VIRTUAL VISIT (OUTPATIENT)
Dept: FAMILY MEDICINE | Facility: OTHER | Age: 35
End: 2020-03-22

## 2020-03-23 ENCOUNTER — NURSE TRIAGE (OUTPATIENT)
Dept: NURSING | Facility: CLINIC | Age: 35
End: 2020-03-23

## 2020-03-23 NOTE — TELEPHONE ENCOUNTER
Patient states when she called [facility] per Virtual Visit recommendation she was directed to [facility].  [facility] told patient to request [facility] nurse be paged when she speaks with them and directly connected her to that number.    Additional Information    Negative: Bluish (or gray) lips or face    Negative: Severe difficulty breathing (e.g., struggling for each breath, speaks in single words)    Negative: Rapid onset of cough and has hives    Negative: Coughing started suddenly after medicine, an allergic food or bee sting    Negative: Difficulty breathing after exposure to flames, smoke, or fumes    Negative: Sounds like a life-threatening emergency to the triager    Negative: Previous asthma attacks and this feels like asthma attack    Negative: Chest pain present when not coughing    Negative: Difficulty breathing    Negative: Passed out (i.e., fainted, collapsed and was not responding)    Negative: Patient sounds very sick or weak to the triager    Negative: Coughed up > 1 tablespoon (15 ml) blood (Exception: blood-tinged sputum)    Negative: Fever > 103 F (39.4 C)    Negative: Fever > 101 F (38.3 C) and over 60 years of age    Negative: Fever > 100.0 F (37.8 C) and has diabetes mellitus or a weak immune system (e.g., HIV positive, cancer chemotherapy, organ transplant, splenectomy, chronic steroids)    Negative: Fever > 100.0 F (37.8 C) and bedridden (e.g., nursing home patient, stroke, chronic illness, recovering from surgery)    Negative: Increasing ankle swelling    Negative: Wheezing is present    Negative: SEVERE coughing spells (e.g., whooping sound after coughing, vomiting after coughing)    Negative: Coughing up corin-colored (reddish-brown) or blood-tinged sputum    Fever present > 3 days (72 hours)    Protocols used: COUGH-A-OH

## 2020-03-23 NOTE — PROGRESS NOTES
"Date: 2020 21:58:45  Clinician: Kia Anne  Clinician NPI: [id number]  Patient: Kathya Chino  Patient : 1985  Patient Address: [address] [place] [address]  Patient Phone: [phone]  Visit Protocol: URI  Patient Summary:  Kathya is a 34 year old ( : 1985 ) female who initiated a Visit for cold, sinus infection, or influenza. When asked the question \"Please sign me up to receive news, health information and promotions from [employer].\", Kathya responded \"Yes\".    Kathya states her symptoms started 1-2 days ago.   Her symptoms consist of myalgia, a cough, and chills. Kathya also feels feverish.   Symptom details     Cough: Kathya coughs every 5-10 minutes and her cough is not more bothersome at night. Phlegm does not come into her throat when she coughs. She does not believe her cough is caused by post-nasal drip.     Temperature: Her current temperature is 99.5 degrees Fahrenheit.      Kathya denies having ear pain, rhinitis, enlarged lymph nodes, facial pain or pressure, wheezing, sore throat, nasal congestion, malaise, teeth pain, and headache. She also denies taking antibiotic medication for the symptoms and having recent facial or sinus surgery in the past 60 days. She is not experiencing dyspnea.   Precipitating events  She has not recently been exposed to someone with influenza. Kathya has not been in close contact with any high risk individuals.   Pertinent COVID-19 (Coronavirus) information  Kathya has traveled internationally or to the areas where COVID-19 (Coronavirus) is widespread, including cruise ship travel in the last 14 days before the start of her symptoms. Countries or locations traveled as reported by the patient (free text): Jillian Rasheed has not had a close contact with a laboratory-confirmed COVID-19 patient within 14 days of symptom onset. She also has not had a close contact with a suspected COVID-19 patient within 14 days of symptom onset.   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 She does not provide direct patient care.   Pertinent medical history  Kathya does not get yeast infections when she takes antibiotics.   Kathya needs a return to work/school note.   Weight: 165 lbs   aKthya does not smoke or use smokeless tobacco.   She denies pregnancy and denies breastfeeding. She has menstruated in the past month.   Weight: 165 lbs    MEDICATIONS: ibuprofen oral, Tylenol oral, sulfasalazine oral, vitamin B complex-folic acid oral, methotrexate sodium oral, ALLERGIES: NKDA  Clinician Response:  Dear Kathya,   Based on the information you have provided, you do have symptoms that are consistent with Coronavirus (COVID-19).  The coronavirus causes mild to severe respiratory illness with the most common symptoms including fever, cough and difficulty breathing. Unfortunately, many viruses cause similar symptoms and it can be difficult to distinguish between viruses, especially in mild cases, so we are presuming that anyone with cough or fever has coronavirus at this time.  Coronavirus/COVID-19 has reached the point of community spread in [place], meaning that we are finding the virus in people with no known exposure risk for javier the virus. Given the increasing commonness of coronavirus in the community we are no longer testing patients who are not critically ill.  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  For everyone else who has cough or fever, you should assume you are infected with coronavirus. Since you will not be tested but have symptoms that may be consistent with coronavirus, the CDC recommends you stay in self-isolation until these three things have happened:    You have had no fever for at least 72 hours (that is three full days of no fever without the use of medicine that reduces fevers)    AND   Other symptoms have improved (for example, when  your cough or shortness of breath have improved)   AND   At least 7 days have passed since your symptoms first appeared.   How to Isolate:   Isolate yourself at home.  Do Not allow any visitors  Do Not go to work or school  Do Not go to [employer],  centers, shopping, or other public places.  Do Not shake hands.  Avoid close contact with others (hugging, kissing).   Protect Others:   Cover Your Mouth and Nose with a mask, disposable tissue or wash cloth to avoid spreading germs to others.  Wash your hands and face frequently with soap and water.   We know it can be scary to hear that you might have COVID-19. Our team can help track your symptoms and make sure you are doing ok over the next two weeks using a program called [employer] to keep in touch. When you receive an email from [employer], please consider enrolling in our monitoring program. There is no cost to you for monitoring. Here is a URL where you can learn more: [email or web address]  Managing Symptoms:   At this time, we primarily recommend Tylenol (Acetaminophen) for fever or pain. If you have liver or kidney problems, contact your primary care provider for instructions on use of tylenol. Adults can take 650 mg (two 325 mg pills) by mouth every 4-6 hours as needed OR 1,000 mg (two 500 mg pills) every 8 hours as needed. MAXIMUM DAILY DOSE: 3,000mg. For children, refer to dosing on bottle based on age or weight.   If you develop significant shortness of breath that prevents you from doing normal activities, please call 911 or proceed to the nearest emergency room and alert them immediately that you have been in self-isolation for possible coronavirus.  For more information about COVID19 and options for caring for yourself at home, please visit the CDC website at https://www.cdc.gov/coronavirus/2019-ncov/about/steps-when-sick.htmlFor more options for care at [facility], please visit our website at  [email or web address]    COVID-19 (Coronavirus) General Information  With the increase in the number of COVID-19 (Coronavirus) cases, we understand you may have some questions. Below is some helpful information on COVID-19 (Coronavirus).  How can I protect myself and others from the COVID-19 (Coronavirus)?  Because there is currently no vaccine to prevent infection, the best way to protect yourself is to avoid being exposed to this virus. Put distance between yourself and other people if COVID-19 (Coronavirus) is spreading in your community. The virus is thought to spread mainly from person-to-person.     Between people who are in close contact with one another (within about 6 about) for a prolonged period (10 minutes or longer).    Through respiratory droplets produced when an infected person coughs or sneezes.     The CDC recommends the following additional steps to protect yourself and others:     Wash your hands often with soap and water for at least 20 seconds, especially after blowing your nose, coughing, or sneezing; going to the bathroom; and before eating or preparing food.  Use an alcohol-based hand  that contains at least 60 percent alcohol if soap and water are not available.        Avoid touching your eyes, nose and mouth with unwashed hands.    Avoid close contact with people who are sick.    Stay home when you are sick.    Cover your cough or sneeze with a tissue, then throw the tissue in the trash.    Clean and disinfect frequently touched objects and surfaces.     You can help stop COVID-19 (Coronavirus) by knowing the signs and symptoms:     Fever    Cough    Shortness of breath     Contact your healthcare provider if   Develop symptoms   AND   Have been in close contact with a person known to have COVID-19 (Coronavirus) or live in or have recently traveled from an area with ongoing spread of COVID-19 (Coronavirus). Call ahead before you go to a doctor's office  or emergency room. Tell them about your recent travel and your symptoms.   For the most up to date information, visit the CDC's website.  Self-monitoring  Self-monitoring means people should monitor themselves for fever by taking their temperatures twice a day and remain alert for a cough or difficulty breathing.  It is important to check your health two times each day for 14 days after a potential exposure to a person with COVID-19 (Coronavirus) or after travel from a location where COVID-19 (Coronavirus) is widespread. If you have been exposed to a person with COVID-19 (Coronavirus), it may take up to 14 days to know if you will get sick. Follow the steps below to check and record your health.     Take your temperature with a thermometer twice a day, once in the morning and once in the evening, and watch for a cough or difficulty breathing for 14 days.    Write down your temperature and any COVID-19 symptoms you may have: feeling feverish, coughing, or difficulty breathing.    Stay home from work or school.    Do not take public transportation, taxis, or ride-shares.    Avoid crowded places (such as shopping centers and movie theaters) and limit your activities in public.    Keep your distance from others (about 6 feet or 2 meters).    If you get sick with fever, cough, or trouble breathing, contact your healthcare provider and tell them about your recent travel and/or your symptoms.    If you need to seek medical care for other reasons, such as dialysis, call ahead to your doctor and tell them about your recent travel.     Steps to help prevent the spread of COVID-19 (Coronavirus) if you are sick  If you are sick with COVID-19 (Coronavirus) or suspect you are infected with the virus that causes COVID-19 (Coronavirus), follow the steps below to help prevent the disease from spreading&nbsp;to people in your home and community.     Stay home except to get medical care. Home isolation may be started in consultation  "with your healthcare clinician.    Separate yourself from other people and animals in your home.    Call ahead before visiting your doctor if you have a medical appointment.    Wear a facemask when you are around other people.    Cover your cough and sneezes.    Clean your hands often.    Avoid sharing personal household items.    Clean and disinfect frequently touched objects and surfaces everyday.    You will need to have someone drop off medications or household supplies (if needed) at your house without coming inside or in contact with you or others living in your house.    Monitor your symptoms and seek prompt medical care if your illness is worsening (e.g. Difficulty breathing).    Discontinue home isolation only in consultation with your healthcare provider.     For more detailed and up to date information on what to do if you are sick, visit this link: What to Do If You Are Sick With Coronavirus Disease 2019 (COVID-19).  Do I need to be tested for COVID-19 (Coronavirus)?     At this time, the limited number of available tests are controlled by the state and local health departments and are being reserved for more seriously ill patients, those with known exposure to confirmed patients, and those with recent travel (within 14 days) to countries with high rates of COVID-19 (Coronavirus).    Decisions on which patients receive testing will be based on the local spread of COVID-19 (Coronavirus) as well as the symptoms. Your healthcare provider will make the final decision on whether you should be tested.    In the meantime, if you have concerns that you may have been exposed, it is reasonable to practice \"social distancing.\"&nbsp; If you are ill with a cold or flu-like illness, please monitor your symptoms and reach out to your healthcare provider if your symptoms worsen.    For more up to date information, visit this link: COVID-19 (Coronavirus) Frequently Asked Questions and Answers.      Diagnosis: " Cough  Diagnosis ICD: R05

## 2020-03-30 ENCOUNTER — RESULTS ONLY (OUTPATIENT)
Dept: LAB | Age: 35
End: 2020-03-30

## 2020-03-30 ENCOUNTER — OFFICE VISIT (OUTPATIENT)
Dept: URGENT CARE | Facility: URGENT CARE | Age: 35
End: 2020-03-30
Payer: COMMERCIAL

## 2020-03-30 DIAGNOSIS — Z20.822 SUSPECTED COVID-19 VIRUS INFECTION: Primary | ICD-10-CM

## 2020-03-30 LAB
SARS-COV-2 RNA SPEC QL NAA+PROBE: NORMAL
SPECIMEN SOURCE: NORMAL

## 2020-03-30 PROCEDURE — 99207 ZZC NO BILLABLE SERVICE THIS VISIT: CPT

## 2020-03-30 NOTE — PATIENT INSTRUCTIONS
[facility] Employee Health team will receive your Covid 19 test results in the next 1-4 days.  Once your results are received, Employee Health will call you with your test result and discuss your Return to Work timeline and criteria.

## 2020-03-31 LAB
COVID-19 VIRUS PCR TO MAYO - RESULT: NORMAL
SPECIMEN SOURCE: NORMAL

## 2020-05-20 ENCOUNTER — RESULTS ONLY (OUTPATIENT)
Dept: LAB | Age: 35
End: 2020-05-20

## 2020-05-20 ENCOUNTER — APPOINTMENT (OUTPATIENT)
Dept: LAB | Facility: CLINIC | Age: 35
End: 2020-05-20

## 2020-05-22 LAB
COVID-19 SPIKE RBD ABY TITER: NORMAL
COVID-19 SPIKE RBD ABY: NEGATIVE

## 2020-06-12 ENCOUNTER — VIRTUAL VISIT (OUTPATIENT)
Dept: FAMILY MEDICINE | Facility: CLINIC | Age: 35
End: 2020-06-12
Payer: COMMERCIAL

## 2020-06-12 DIAGNOSIS — F41.9 ANXIETY: ICD-10-CM

## 2020-06-12 DIAGNOSIS — F32.0 MILD MAJOR DEPRESSION (H): Primary | ICD-10-CM

## 2020-06-12 PROCEDURE — 99214 OFFICE O/P EST MOD 30 MIN: CPT | Mod: GT | Performed by: FAMILY MEDICINE

## 2020-06-12 PROCEDURE — 96127 BRIEF EMOTIONAL/BEHAV ASSMT: CPT | Performed by: FAMILY MEDICINE

## 2020-06-12 RX ORDER — HYDROXYZINE PAMOATE 25 MG/1
25 CAPSULE ORAL
COMMUNITY
Start: 2019-12-30 | End: 2021-03-01 | Stop reason: SINTOL

## 2020-06-12 RX ORDER — CLONAZEPAM 0.5 MG/1
0.5 TABLET, ORALLY DISINTEGRATING ORAL
Qty: 30 TABLET | Refills: 0 | Status: SHIPPED | OUTPATIENT
Start: 2020-06-12 | End: 2020-08-27

## 2020-06-12 RX ORDER — METHOTREXATE 2.5 MG/1
7 TABLET ORAL WEEKLY
COMMUNITY
Start: 2020-03-01 | End: 2021-08-20

## 2020-06-12 RX ORDER — SULFASALAZINE 500 MG/1
1000 TABLET, DELAYED RELEASE ORAL 2 TIMES DAILY
COMMUNITY
Start: 2019-12-11 | End: 2021-10-08

## 2020-06-12 RX ORDER — ESCITALOPRAM OXALATE 10 MG/1
10 TABLET ORAL DAILY
Qty: 30 TABLET | Refills: 1 | Status: SHIPPED | OUTPATIENT
Start: 2020-06-12 | End: 2020-08-13

## 2020-06-12 ASSESSMENT — PATIENT HEALTH QUESTIONNAIRE - PHQ9: SUM OF ALL RESPONSES TO PHQ QUESTIONS 1-9: 17

## 2020-06-12 NOTE — PROGRESS NOTES
"Kathya Ac is a 34 year old female who is being evaluated via a billable video visit.      The patient has been notified of following:     \"This video visit will be conducted via a call between you and your physician/provider. We have found that certain health care needs can be provided without the need for an in-person physical exam.  This service lets us provide the care you need with a video conversation.  If a prescription is necessary we can send it directly to your pharmacy.  If lab work is needed we can place an order for that and you can then stop by our lab to have the test done at a later time.    Video visits are billed at different rates depending on your insurance coverage.  Please reach out to your insurance provider with any questions.    If during the course of the call the physician/provider feels a video visit is not appropriate, you will not be charged for this service.\"    Patient has given verbal consent for Video visit? Yes    Will anyone else be joining your video visit? No      Subjective     Kathya Ac is a 34 year old female who presents today via video visit for the following health issues:    HPI  Insomnia      Duration: 4 to 6 weeks     Description  Frequency of insomnia:  nightly  Time to fall asleep: 2 hours  Middle of night awakening:  nightly  Early morning awakening:  nightly    Accompanying signs and symptoms:  fatigue and depression/mood changes    History  Similar episodes in past:  YES  Previous evaluation/sleep study:  no     Saw Mónica last summer for anxiety-- was given clonazepam #30 and did okay.  In December went to [facility] and asked for refill but would not refill the benzo- was given Vistaril instead.  Her dad  suddenly a month ago- they were able to have a small  but increased grief and depressed mood noted.  She did use Paxil 11 years ago- has some dizziness with it.    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  Had to home school first grade son through the pandemic with his distance learning.       Video Start Time: 920a        Patient Active Problem List   Diagnosis     CARDIOVASCULAR SCREENING; LDL GOAL LESS THAN 160     Psoriasis     Migraine with aura and without status migrainosus, not intractable     Endometriosis     Past Surgical History:   Procedure Laterality Date     ABDOMEN SURGERY  2004    Laparoscopy     BIOPSY  2004     EYE SURGERY  2014       Social History     Tobacco Use     Smoking status: Never Smoker     Smokeless tobacco: Never Used   Substance Use Topics     Alcohol use: No     Family History   Problem Relation Age of Onset     Family History Negative Mother         colon polyps     Chronic Obstructive Pulmonary Disease Father      Hypertension Father      Prostate Cancer Father 56     Coronary Artery Disease Father      Alzheimer Disease Maternal Grandmother      Melanoma Maternal Grandmother      Colon Cancer Maternal Grandfather      Melanoma Maternal Aunt          Current Outpatient Medications   Medication Sig Dispense Refill     clonazePAM (KLONOPIN) 0.5 MG ODT Take 1 tablet (0.5 mg) by mouth 2 times daily as needed for anxiety 30 tablet 1     methotrexate sodium 2.5 MG TABS        sulfaSALAzine ER (AZULFIDINE EN) 500 MG EC tablet        hydrOXYzine (VISTARIL) 25 MG capsule Take 25 mg by mouth       omeprazole (PRILOSEC) 40 MG capsule Take 1 capsule (40 mg) by mouth 2 times daily (before meals) Take 30-60 minutes before a meal. (Patient not taking: Reported on 6/12/2020) 60 capsule 1     No Known Allergies  Recent Labs   Lab Test 05/21/19  0757 09/11/18  1616 03/09/18  1403 09/28/17  0939 08/21/17  1436 04/17/17  1333   LDL  --   --   --  59  --   --    HDL  --   --   --  74  --   --    TRIG  --   --   --  29  --   --    ALT 15  --  13  --   --  12   CR 0.91 0.68 0.81  --   --  0.97   GFRESTIMATED 84 >90 82  --   --  68   GFRESTBLACK >90 >90 >90  --   --  82   POTASSIUM 4.0 3.4 3.8  --  "  --  4.3   TSH 1.46  --   --   --  2.34  --       BP Readings from Last 3 Encounters:   05/21/19 113/78   09/11/18 99/67   03/09/18 106/62    Wt Readings from Last 3 Encounters:   05/21/19 73.5 kg (162 lb)   09/11/18 72.1 kg (159 lb)   03/09/18 70.6 kg (155 lb 9.6 oz)                    Reviewed and updated as needed this visit by Provider         Review of Systems   Constitutional, HEENT, cardiovascular, pulmonary, GI, , musculoskeletal, neuro, skin, endocrine and psych systems are negative, except as otherwise noted.      Objective    There were no vitals taken for this visit.  Estimated body mass index is 26.15 kg/m  as calculated from the following:    Height as of 9/11/18: 1.676 m (5' 6\").    Weight as of 5/21/19: 73.5 kg (162 lb).  Physical Exam     GENERAL: Healthy, alert and no distress  EYES: Eyes grossly normal to inspection.  No discharge or erythema, or obvious scleral/conjunctival abnormalities.  RESP: No audible wheeze, cough, or visible cyanosis.  No visible retractions or increased work of breathing.    SKIN: Visible skin clear. No significant rash, abnormal pigmentation or lesions.  NEURO: Cranial nerves grossly intact.  Mentation and speech appropriate for age.  PSYCH: Mentation appears normal, affect normal/bright, judgement and insight intact, normal speech and appearance well-groomed.          Assessment & Plan     1. Anxiety  2. Mild major depression (H)    - clonazePAM (KLONOPIN) 0.5 MG ODT; Take 1 tablet (0.5 mg) by mouth nightly as needed for anxiety  Dispense: 30 tablet; Refill: 0  - escitalopram (LEXAPRO) 10 MG tablet; Take 1 tablet (10 mg) by mouth daily  Dispense: 30 tablet; Refill: 1     Patient agreeable to starting Lexapro 10 mg once daily with #30 clonazepam prn insomnia and ZACK.  Follow-up 4 weeks for med check and further titration prn.    Randi Peguero MD  [facility]      Video-Visit Details    Type of service:  Video Visit    Video End Time: 231q- " converted to phone as patient could not hear me well    Originating Location (pt. Location): Home    Distant Location (provider location):  [facility]     Platform used for Video Visit: Alesha    No follow-ups on file.       Randi Peguero MD

## 2020-08-13 ENCOUNTER — MYC MEDICAL ADVICE (OUTPATIENT)
Dept: FAMILY MEDICINE | Facility: CLINIC | Age: 35
End: 2020-08-13

## 2020-08-13 DIAGNOSIS — F32.0 MILD MAJOR DEPRESSION (H): ICD-10-CM

## 2020-08-13 DIAGNOSIS — F41.9 ANXIETY: ICD-10-CM

## 2020-08-13 RX ORDER — ESCITALOPRAM OXALATE 10 MG/1
10 TABLET ORAL DAILY
Qty: 30 TABLET | Refills: 0 | Status: SHIPPED | OUTPATIENT
Start: 2020-08-13 | End: 2020-08-30

## 2020-08-27 ENCOUNTER — OFFICE VISIT (OUTPATIENT)
Dept: FAMILY MEDICINE | Facility: CLINIC | Age: 35
End: 2020-08-27
Payer: COMMERCIAL

## 2020-08-27 VITALS
RESPIRATION RATE: 16 BRPM | OXYGEN SATURATION: 98 % | HEART RATE: 77 BPM | DIASTOLIC BLOOD PRESSURE: 72 MMHG | TEMPERATURE: 98.2 F | WEIGHT: 168 LBS | SYSTOLIC BLOOD PRESSURE: 112 MMHG | BODY MASS INDEX: 27.12 KG/M2

## 2020-08-27 DIAGNOSIS — Z12.4 SCREENING FOR CERVICAL CANCER: ICD-10-CM

## 2020-08-27 DIAGNOSIS — F41.9 ANXIETY: ICD-10-CM

## 2020-08-27 DIAGNOSIS — F32.0 MILD MAJOR DEPRESSION (H): ICD-10-CM

## 2020-08-27 DIAGNOSIS — N93.8 DUB (DYSFUNCTIONAL UTERINE BLEEDING): Primary | ICD-10-CM

## 2020-08-27 PROCEDURE — 99395 PREV VISIT EST AGE 18-39: CPT | Performed by: FAMILY MEDICINE

## 2020-08-27 PROCEDURE — 87624 HPV HI-RISK TYP POOLED RSLT: CPT | Performed by: FAMILY MEDICINE

## 2020-08-27 PROCEDURE — G0145 SCR C/V CYTO,THINLAYER,RESCR: HCPCS | Performed by: FAMILY MEDICINE

## 2020-08-27 PROCEDURE — 99214 OFFICE O/P EST MOD 30 MIN: CPT | Mod: 25 | Performed by: FAMILY MEDICINE

## 2020-08-27 RX ORDER — PREDNISONE 5 MG/1
TABLET ORAL
COMMUNITY
Start: 2019-12-11 | End: 2021-10-08

## 2020-08-27 RX ORDER — CLONAZEPAM 0.5 MG/1
0.5 TABLET, ORALLY DISINTEGRATING ORAL
Qty: 30 TABLET | Refills: 1 | Status: SHIPPED | OUTPATIENT
Start: 2020-08-27 | End: 2021-03-01

## 2020-08-27 NOTE — LETTER
My Depression Action Plan  Name: Kathya Ac   Date of Birth 1985  Date: 8/27/2020    My doctor: Adriana Sales   My clinic: [facility] [address] [place] [id number] [phone]          GREEN    ZONE   Good Control    What it looks like:     Things are going generally well. You have normal ups and downs. You may even feel depressed from time to time, but bad moods usually last less than a day.   What you need to do:  1. Continue to care for yourself (see self care plan)  2. Check your depression survival kit and update it as needed  3. Follow your physician s recommendations including any medication.  4. Do not stop taking medication unless you consult with your physician first.           YELLOW         ZONE Getting Worse    What it looks like:     Depression is starting to interfere with your life.     It may be hard to get out of bed; you may be starting to isolate yourself from others.    Symptoms of depression are starting to last most all day and this has happened for several days.     You may have suicidal thoughts but they are not constant.   What you need to do:     1. Call your care team. Your response to treatment will improve if you keep your care team informed of your progress. Yellow periods are signs an adjustment may need to be made.     2. Continue your self-care.  Just get dressed and ready for the day.  Don't give yourself time to talk yourself out of it.    3. Talk to someone in your support network.    4. Open up your Depression Self-Care Plan/Wellness Kit.           RED    ZONE Medical Alert - Get Help    What it looks like:     Depression is seriously interfering with your life.     You may experience these or other symptoms: You can t get out of bed most days, can t work or engage in other necessary activities, you have trouble taking care of basic hygiene, or basic responsibilities, thoughts of suicide or death that will not go  away, self-injurious behavior.     What you need to do:  1. Call your care team and request a same-day appointment. If they are not available (weekends or after hours) call your local crisis line, emergency room or 911.            Depression Self-Care Plan / Wellness Kit    Self-Care for Depression  Here s the deal. Your body and mind are really not as separate as most people think.  What you do and think affects how you feel and how you feel influences what you do and think. This means if you do things that people who feel good do, it will help you feel better.  Sometimes this is all it takes.  There is also a place for medication and therapy depending on how severe your depression is, so be sure to consult with your medical provider and/ or Behavioral Health Consultant if your symptoms are worsening or not improving.     In order to better manage my stress, I will:    Exercise  Get some form of exercise, every day. This will help reduce pain and release endorphins, the  feel good  chemicals in your brain. This is almost as good as taking antidepressants!  This is not the same as joining a gym and then never going! (they count on that by the way ) It can be as simple as just going for a walk or doing some gardening, anything that will get you moving.      Hygiene   Maintain good hygiene (get out of bed in the morning, make your bed, brush your teeth, take a shower, and get dressed like you were going to work, even if you are unemployed).  If your clothes don't fit try to get ones that do.    Diet  Strive to eat foods that are good for me, drink plenty of water, and avoid excessive sugar, caffeine, alcohol, and other mood-altering substances.  Some foods that are helpful in depression are: complex carbohydrates, B vitamins, flaxseed, fish or fish oil, fresh fruits and vegetables.    Psychotherapy  Agree to participate in Individual Therapy (if recommended).    Medication  If prescribed medications, I agree to take  them.  Missing doses can result in serious side effects.  I understand that drinking alcohol, or other illicit drug use, may cause potential side effects.  I will not stop my medication abruptly without first discussing it with my provider.    Staying Connected With Others  Stay in touch with my friends, family members, and my primary care provider/team.    Use your imagination  Be creative.  We all have a creative side; it doesn t matter if it s oil painting, sand castles, or mud pies! This will also kick up the endorphins.    Witness Beauty  (AKA stop and smell the roses) Take a look outside, even in mid-winter. Notice colors, textures. Watch the squirrels and birds.     Service to others  Be of service to others.  There is always someone else in need.  By helping others we can  get out of ourselves  and remember the really important things.  This also provides opportunities for practicing all the other parts of the program.    Humor  Laugh and be silly!  Adjust your TV habits for less news and crime-drama and more comedy.    Control your stress  Try breathing deep, massage therapy, biofeedback, and meditation. Find time to relax each day.     Crisis Text Line  http://www.crisistextline.org    The Crisis Text Line serves anyone, in any type of crisis, providing access to free, 24/7 support and information via the medium people already use and trust:    Here's how it works:  1.  Text 836-783 from anywhere in the [place], anytime, about any type of crisis.  2.  A live, trained Crisis Counselor receives the text and responds quickly.  3.  The volunteer Crisis Counselor will help you move from a 'hot moment to a cool moment'.    My support system    Clinic Contact:  Phone number:    Contact 1:  Phone number:    Contact 2:  Phone number:    Anglican/:  Phone number:    Therapist:  Phone number:    Local crisis center:    Phone number:    Other community support:  Phone number:

## 2020-08-27 NOTE — PROGRESS NOTES
Subjective         HPI   Presents today with increased DUB.  Hx of endometriosis but notes this bleeding is a different color and consistency.  She is also due for GYN exam so would like to complete this as well today.    She is also due for Follow-up after starting on medication for depression/ZAKC following the sudden and traumatic loss of her dad during the pandemic earlier this winter.  Trauma of losing dad also confronted her with increased grief losing first child at delivery as breech.  Had miscarriage with second pregnancy and now 2 healthy children at home.  Feels she has been doing better on the Lexapro- continues to use clonazepam for increased anxiety at night.    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  Had to home school first grade son through the pandemic with his distance learning.    Patient Active Problem List   Diagnosis     CARDIOVASCULAR SCREENING; LDL GOAL LESS THAN 160     Psoriasis     Migraine with aura and without status migrainosus, not intractable     Endometriosis     Past Surgical History:   Procedure Laterality Date     ABDOMEN SURGERY  2004    Laparoscopy     BIOPSY  2004     EYE SURGERY  2014       Social History     Tobacco Use     Smoking status: Never Smoker     Smokeless tobacco: Never Used   Substance Use Topics     Alcohol use: No     Family History   Problem Relation Age of Onset     Family History Negative Mother         colon polyps     Chronic Obstructive Pulmonary Disease Father      Hypertension Father      Prostate Cancer Father 56     Coronary Artery Disease Father      Alzheimer Disease Maternal Grandmother      Melanoma Maternal Grandmother      Colon Cancer Maternal Grandfather      Melanoma Maternal Aunt          Current Outpatient Medications   Medication Sig Dispense Refill     clonazePAM (KLONOPIN) 0.5 MG  ODT Take 1 tablet (0.5 mg) by mouth nightly as needed for anxiety 30 tablet 1     escitalopram (LEXAPRO) 10 MG tablet Take 1 tablet (10 mg) by mouth daily 90 tablet 1     hydrOXYzine (VISTARIL) 25 MG capsule Take 25 mg by mouth       methotrexate sodium 2.5 MG TABS Take 7 tablets by mouth once a week        sulfaSALAzine ER (AZULFIDINE EN) 500 MG EC tablet Take 1,000 mg by mouth 2 times daily        omeprazole (PRILOSEC) 40 MG capsule Take 1 capsule (40 mg) by mouth 2 times daily (before meals) Take 30-60 minutes before a meal. (Patient not taking: Reported on 6/12/2020) 60 capsule 1     predniSONE (DELTASONE) 5 MG tablet TK 2 TS PO QAM       No Known Allergies  Recent Labs   Lab Test 05/21/19  0757 09/11/18  1616 03/09/18  1403 09/28/17  0939 08/21/17  1436 04/17/17  1333   LDL  --   --   --  59  --   --    HDL  --   --   --  74  --   --    TRIG  --   --   --  29  --   --    ALT 15  --  13  --   --  12   CR 0.91 0.68 0.81  --   --  0.97   GFRESTIMATED 84 >90 82  --   --  68   GFRESTBLACK >90 >90 >90  --   --  82   POTASSIUM 4.0 3.4 3.8  --   --  4.3   TSH 1.46  --   --   --  2.34  --       BP Readings from Last 3 Encounters:   08/27/20 112/72   05/21/19 113/78   09/11/18 99/67    Wt Readings from Last 3 Encounters:   08/27/20 76.2 kg (168 lb)   05/21/19 73.5 kg (162 lb)   09/11/18 72.1 kg (159 lb)                    Reviewed and updated as needed this visit by Provider         Review of Systems   ROS COMP: Constitutional, HEENT, cardiovascular, pulmonary, GI, , musculoskeletal, neuro, skin, endocrine and psych systems are negative, except as otherwise noted.      Objective    /72   Pulse 77   Temp 98.2  F (36.8  C) (Tympanic)   Resp 16   Wt 76.2 kg (168 lb)   LMP 08/19/2020 (Approximate)   SpO2 98%   Breastfeeding No   BMI 27.12 kg/m      Physical Exam   GENERAL: healthy, alert and no distress  EYES: Eyes grossly normal to inspection, PERRL and conjunctivae and sclerae normal  HENT: ear canals and  TM's normal, nose and mouth without ulcers or lesions  NECK: no adenopathy, no asymmetry, masses, or scars and thyroid normal to palpation  RESP: lungs clear to auscultation - no rales, rhonchi or wheezes  CV: regular rate and rhythm, normal S1 S2, no S3 or S4, no murmur, click or rub, no peripheral edema and peripheral pulses strong  ABDOMEN: soft, nontender, no hepatosplenomegaly, no masses and bowel sounds normal  MS: no gross musculoskeletal defects noted, no edema  SKIN: no suspicious lesions or rashes  NEURO: Normal strength and tone, mentation intact and speech normal  PSYCH: mentation appears normal, affect normal/bright            Assessment & Plan     1. Screening for cervical cancer  2. DUB (dysfunctional uterine bleeding)    - US Pelvic Complete w Transvaginal; Future  - Pap imaged thin layer screen with HPV - recommended age 30 - 65  - HPV High Risk Types DNA Cervical    PAP updated today.  Will check U/S for further eval of DUB with hx of endometriosis.    3. Anxiety  4. Mild major depression (H)    - clonazePAM (KLONOPIN) 0.5 MG ODT; Take 1 tablet (0.5 mg) by mouth nightly as needed for anxiety  Dispense: 30 tablet; Refill: 1  - escitalopram (LEXAPRO) 10 MG tablet; Take 1 tablet (10 mg) by mouth daily  Dispense: 90 tablet; Refill: 1     Stable on current regimen- recommend EMDR therapy to help with trauma background to her depression/ZACK.    Randi Peguero MD  [facility]

## 2020-08-30 RX ORDER — ESCITALOPRAM OXALATE 10 MG/1
10 TABLET ORAL DAILY
Qty: 90 TABLET | Refills: 1 | Status: SHIPPED | OUTPATIENT
Start: 2020-08-30 | End: 2021-03-01 | Stop reason: DRUGHIGH

## 2020-09-01 LAB
COPATH REPORT: NORMAL
PAP: NORMAL

## 2020-09-02 ENCOUNTER — ANCILLARY PROCEDURE (OUTPATIENT)
Dept: ULTRASOUND IMAGING | Facility: CLINIC | Age: 35
End: 2020-09-02
Attending: FAMILY MEDICINE
Payer: COMMERCIAL

## 2020-09-02 DIAGNOSIS — N93.8 DUB (DYSFUNCTIONAL UTERINE BLEEDING): ICD-10-CM

## 2020-09-02 LAB
FINAL DIAGNOSIS: NORMAL
HPV HR 12 DNA CVX QL NAA+PROBE: NEGATIVE
HPV16 DNA SPEC QL NAA+PROBE: NEGATIVE
HPV18 DNA SPEC QL NAA+PROBE: NEGATIVE
SPECIMEN DESCRIPTION: NORMAL
SPECIMEN SOURCE CVX/VAG CYTO: NORMAL

## 2020-12-27 ENCOUNTER — HEALTH MAINTENANCE LETTER (OUTPATIENT)
Age: 35
End: 2020-12-27

## 2021-02-23 ENCOUNTER — E-VISIT (OUTPATIENT)
Dept: URGENT CARE | Facility: URGENT CARE | Age: 36
End: 2021-02-23
Payer: COMMERCIAL

## 2021-02-23 ENCOUNTER — OFFICE VISIT (OUTPATIENT)
Dept: URGENT CARE | Facility: URGENT CARE | Age: 36
End: 2021-02-23
Attending: PHYSICIAN ASSISTANT
Payer: COMMERCIAL

## 2021-02-23 DIAGNOSIS — Z20.822 SUSPECTED COVID-19 VIRUS INFECTION: ICD-10-CM

## 2021-02-23 DIAGNOSIS — J02.9 SORE THROAT: ICD-10-CM

## 2021-02-23 LAB
DEPRECATED S PYO AG THROAT QL EIA: NEGATIVE
SPECIMEN SOURCE: NORMAL
SPECIMEN SOURCE: NORMAL
STREP GROUP A PCR: NOT DETECTED

## 2021-02-23 PROCEDURE — 87651 STREP A DNA AMP PROBE: CPT | Performed by: PHYSICIAN ASSISTANT

## 2021-02-23 PROCEDURE — U0005 INFEC AGEN DETEC AMPLI PROBE: HCPCS | Performed by: PHYSICIAN ASSISTANT

## 2021-02-23 PROCEDURE — U0003 INFECTIOUS AGENT DETECTION BY NUCLEIC ACID (DNA OR RNA); SEVERE ACUTE RESPIRATORY SYNDROME CORONAVIRUS 2 (SARS-COV-2) (CORONAVIRUS DISEASE [COVID-19]), AMPLIFIED PROBE TECHNIQUE, MAKING USE OF HIGH THROUGHPUT TECHNOLOGIES AS DESCRIBED BY CMS-2020-01-R: HCPCS | Performed by: PHYSICIAN ASSISTANT

## 2021-02-23 PROCEDURE — 99N1174 PR STATISTIC STREP A RAPID: Performed by: PHYSICIAN ASSISTANT

## 2021-02-23 PROCEDURE — 99207 PR NO CHARGE LOS: CPT

## 2021-02-23 PROCEDURE — 99421 OL DIG E/M SVC 5-10 MIN: CPT | Performed by: PHYSICIAN ASSISTANT

## 2021-02-23 NOTE — PATIENT INSTRUCTIONS
Dear Kathya Ac,    Your symptoms show that you may have coronavirus (COVID-19). This illness can cause fever, cough and trouble breathing. Many people get a mild case and get better on their own. Some people can get very sick.    Because you also reported sore throat I would like to also test you for Strep Throat to determine if we need to treat you for that as well.    What should I do?  We would like to test you for Covid-19 virus and Strep Throat. I have placed orders for these tests.     For all employees or close contacts (except [employer] and [employer] - see below), go to your [employer] home page and scroll down to the section that says  You have an appointment that needs to be scheduled  and click the large green button that says  Schedule Now  and follow the steps to find the next available opening.  It is important that when you are asked what the reason for your appointment is that you mention you need BOTH Covid and Strep tests.  tests.     If you are unable to complete these steps or if you cannot find any available times, please call [phone] to schedule employee testing.     [employer] [facility] employees or close contacts, please call [phone].   [place] [employer]) employees or close contacts call [phone].    Return to work/school/ guidance:  Please let your workplace manager and staffing office know when your quarantine ends     We can t give you an exact date as it depends on the above. You can calculate this on your own or work with your manager/staffing office to calculate this. (For example if you were exposed on 10/4, you would have to quarantine for 14 full days. That would be through 10/18. You could return on 10/19.)      If you receive a positive COVID-19 test result, follow the guidance of the those who are giving you the results. Usually the return to work is 10 (or in some cases 20 days from symptom onset.) If you work at [employer], you must also be cleared by Employee  Occupational Health and Safety to return to work.        If you receive a negative COVID-19 test result and did not have a high risk exposure to someone with a known positive COVID-19 test, you can return to work once you're free of fever for 24 hours without fever-reducing medication and your symptoms are improving or resolved.      If you receive a negative COVID-19 test and If you had a high risk exposure to someone who has tested positive for COVID-19 then you can return to work 14 days after your last contact with the positive individual    Note: If you have ongoing exposure to the covid positive person, this quarantine period may be more than 14 days. (For example, if you are continued to be exposed to your child who tested positive and cannot isolate from them, then the quarantine of 7-14 days can't start until your child is no longer contagious. This is typically 10 days from onset of the child's symptoms. So the total duration may be 17-24 days in this case.)    Sign up for [employer].   We know it's scary to hear that you might have COVID-19. We want to track your symptoms to make sure you're okay over the next 2 weeks. Please look for an email from [employer]--this is a free, online program that we'll use to keep in touch. To sign up, follow the link in the email you will receive. Learn more at [email or web address]    How can I take care of myself?    Get lots of rest. Drink extra fluids (unless a doctor has told you not to)    Take Tylenol (acetaminophen) or ibuprofen for fever or pain. If you have liver or kidney problems, ask your family doctor if it's okay to take Tylenol o ibuprofen    If you have other health problems (like cancer, heart failure, an organ transplant or severe kidney disease): Call your specialty clinic if you don't feel better in the next 2 days.    Know when to call 911. Emergency warning signs include:  o Trouble breathing or shortness of breath  o Pain or  pressure in the chest that doesn't go away  o Feeling confused like you haven't felt before, or not being able to wake up  o Bluish-colored lips or face    Where can I get more information?  [facility] - About COVID-19:   [email or web address]    CDC - What to Do If You're Sick:   www.cdc.gov/coronavirus/2019-ncov/about/steps-when-sick.html

## 2021-02-24 LAB
SARS-COV-2 RNA RESP QL NAA+PROBE: NOT DETECTED
SPECIMEN SOURCE: NORMAL

## 2021-03-01 ENCOUNTER — VIRTUAL VISIT (OUTPATIENT)
Dept: FAMILY MEDICINE | Facility: CLINIC | Age: 36
End: 2021-03-01
Payer: COMMERCIAL

## 2021-03-01 DIAGNOSIS — F32.0 MILD MAJOR DEPRESSION (H): ICD-10-CM

## 2021-03-01 DIAGNOSIS — F41.9 ANXIETY: ICD-10-CM

## 2021-03-01 PROCEDURE — 99214 OFFICE O/P EST MOD 30 MIN: CPT | Mod: TEL | Performed by: FAMILY MEDICINE

## 2021-03-01 RX ORDER — CLONAZEPAM 0.5 MG/1
0.5 TABLET, ORALLY DISINTEGRATING ORAL
Qty: 30 TABLET | Refills: 2 | Status: SHIPPED | OUTPATIENT
Start: 2021-03-01 | End: 2021-05-28

## 2021-03-01 RX ORDER — IBUPROFEN 200 MG
2 CAPSULE ORAL DAILY
COMMUNITY

## 2021-03-01 RX ORDER — ESCITALOPRAM OXALATE 20 MG/1
20 TABLET ORAL DAILY
Qty: 30 TABLET | Refills: 1 | Status: SHIPPED | OUTPATIENT
Start: 2021-03-01 | End: 2021-04-29

## 2021-03-01 ASSESSMENT — PATIENT HEALTH QUESTIONNAIRE - PHQ9
SUM OF ALL RESPONSES TO PHQ QUESTIONS 1-9: 10
SUM OF ALL RESPONSES TO PHQ QUESTIONS 1-9: 10
10. IF YOU CHECKED OFF ANY PROBLEMS, HOW DIFFICULT HAVE THESE PROBLEMS MADE IT FOR YOU TO DO YOUR WORK, TAKE CARE OF THINGS AT HOME, OR GET ALONG WITH OTHER PEOPLE: VERY DIFFICULT

## 2021-03-01 ASSESSMENT — ANXIETY QUESTIONNAIRES
4. TROUBLE RELAXING: SEVERAL DAYS
3. WORRYING TOO MUCH ABOUT DIFFERENT THINGS: SEVERAL DAYS
5. BEING SO RESTLESS THAT IT IS HARD TO SIT STILL: NEARLY EVERY DAY
GAD7 TOTAL SCORE: 13
GAD7 TOTAL SCORE: 13
6. BECOMING EASILY ANNOYED OR IRRITABLE: MORE THAN HALF THE DAYS
1. FEELING NERVOUS, ANXIOUS, OR ON EDGE: NEARLY EVERY DAY
7. FEELING AFRAID AS IF SOMETHING AWFUL MIGHT HAPPEN: SEVERAL DAYS
GAD7 TOTAL SCORE: 13
7. FEELING AFRAID AS IF SOMETHING AWFUL MIGHT HAPPEN: SEVERAL DAYS
2. NOT BEING ABLE TO STOP OR CONTROL WORRYING: MORE THAN HALF THE DAYS

## 2021-03-01 NOTE — PROGRESS NOTES
Kathya is a 35 year old who is being evaluated via a billable telephone visit.      What phone number would you like to be contacted at? [phone]  How would you like to obtain your AVS? MyChart    Assessment & Plan     Anxiety  Mild major depression (H)    - clonazePAM (KLONOPIN) 0.5 MG ODT; Take 1 tablet (0.5 mg) by mouth nightly as needed for anxiety  - escitalopram (LEXAPRO) 20 MG tablet; Take 1 tablet (20 mg) by mouth daily    Increased stressors with worsening mood and insomnia.  Notes increased emotional eating at highest weight currently.  Would like to increase Lexapro to 20mg once daily and refill of clonazepam.  Will Follow-up in 4 weeks for med check-- will consider Topamax/phentermine prn to help with weight prn.  Patient voices agreement of plan.    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             Review of Systems   Constitutional, HEENT, cardiovascular, pulmonary, GI, , musculoskeletal, neuro, skin, endocrine and psych systems are negative, except as otherwise noted.      Objective           Vitals:  No vitals were obtained today due to virtual visit.    Physical Exam   healthy, alert and no distress  PSYCH: Alert and oriented times 3; coherent speech, normal   rate and volume, able to articulate logical thoughts, able   to abstract reason, no tangential  thoughts, no hallucinations   or delusions  Her affect is normal and pleasant  RESP: No cough, no audible wheezing, able to talk in full sentences  Remainder of exam unable to be completed due to telephone visits                Phone call duration: 7 minutes  Answers for HPI/ROS submitted by the patient on 3/1/2021   If you checked off any problems, how difficult have these problems made it for you to do your work, take care of things at home, or get along with other people?: Very difficult  PHQ9 TOTAL SCORE: 10  ZACK 7 TOTAL SCORE: 13

## 2021-03-02 ASSESSMENT — ANXIETY QUESTIONNAIRES: GAD7 TOTAL SCORE: 13

## 2021-04-08 ENCOUNTER — TRANSFERRED RECORDS (OUTPATIENT)
Dept: HEALTH INFORMATION MANAGEMENT | Facility: CLINIC | Age: 36
End: 2021-04-08

## 2021-04-08 LAB
ALT SERPL-CCNC: 28 IU/L (ref 5–35)
AST SERPL-CCNC: 23 U/L (ref 5–34)
CREAT SERPL-MCNC: 0.76 MG/DL (ref 0.5–1.3)

## 2021-04-28 DIAGNOSIS — F32.0 MILD MAJOR DEPRESSION (H): ICD-10-CM

## 2021-04-28 DIAGNOSIS — F41.9 ANXIETY: ICD-10-CM

## 2021-04-29 RX ORDER — ESCITALOPRAM OXALATE 20 MG/1
20 TABLET ORAL DAILY
Qty: 30 TABLET | Refills: 0 | Status: SHIPPED | OUTPATIENT
Start: 2021-04-29 | End: 2021-05-28

## 2021-05-26 ASSESSMENT — PATIENT HEALTH QUESTIONNAIRE - PHQ9: SUM OF ALL RESPONSES TO PHQ QUESTIONS 1-9: 6

## 2021-05-27 NOTE — PROGRESS NOTES
"    Assessment & Plan     Anxiety  Mild major depression (H)    - escitalopram (LEXAPRO) 20 MG tablet; Take 1 tablet (20 mg) by mouth daily   - clonazePAM (KLONOPIN) 0.5 MG ODT; Take 1 tablet (0.5 mg) by mouth nightly as needed for anxiety    Stable on current regimen at this time- no change.  Meds refilled.    Binge eating disorder  Class 2 obesity    - phentermine (ADIPEX-P) 15 MG capsule; Take 1 capsule (15 mg) by mouth every morning  - COMPREHENSIVE WEIGHT MANAGEMENT    Patient agreeable to trial of phentermine 15 mg daily.  [facility] to decide if needs dose increase before I transition out of Primary Care.  Referred to Medical Weight Management to continue medication if working well for patient.  Discussed considering Topamax as additional med to consider if plateaus or finds phentermine ineffective alone.    30 minutes spent on the date of the encounter doing chart review, patient visit and documentation        BMI:   Estimated body mass index is 32.11 kg/m  as calculated from the following:    Height as of this encounter: 1.702 m (5' 7\").    Weight as of this encounter: 93 kg (205 lb).           No follow-ups on file.    Randi Peguero MD  [facility]    Leslee Rasheed is a 35 year old who presents for the following health issues     HPI   Med check after increase of Lexapro to 20 mg in March-- feels sleep has improved and has not needed to use clonazepam regularly since this dose increase.    Still with increased stressors.  Continues to struggle with weight and binge-eating.  Would like to consider medication at this time for weight management.      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  Lives with  and 2 kids ages 6 son and 3 year old daughter- both with ASD. 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               Review of Systems   Constitutional, HEENT, cardiovascular, pulmonary, GI, , musculoskeletal, neuro, skin, endocrine and psych systems are negative, except as otherwise noted.      Objective    /82   Pulse 78   Temp 97.5  F (36.4  C) (Tympanic)   Ht 1.702 m (5' 7\")   Wt 93 kg (205 lb)   LMP 05/14/2021 (Approximate)   SpO2 98%   Breastfeeding No   BMI 32.11 kg/m    Body mass index is 32.11 kg/m .  Physical Exam   GENERAL: healthy, alert and no distress  EYES: Eyes grossly normal to inspection, PERRL and conjunctivae and sclerae normal  NECK: no adenopathy, no asymmetry, masses, or scars and thyroid normal to palpation  MS: no gross musculoskeletal defects noted, no edema  SKIN: no suspicious lesions or rashes  NEURO: Normal strength and tone, mentation intact and speech normal  PSYCH: mentation appears normal, affect normal/bright                Answers for HPI/ROS submitted by the patient on 5/28/2021   If you checked off any problems, how difficult have these problems made it for you to do your work, take care of things at home, or get along with other people?: Somewhat difficult  PHQ9 TOTAL SCORE: 6  ZACK 7 TOTAL SCORE: 8    "

## 2021-05-28 ENCOUNTER — OFFICE VISIT (OUTPATIENT)
Dept: FAMILY MEDICINE | Facility: CLINIC | Age: 36
End: 2021-05-28
Payer: COMMERCIAL

## 2021-05-28 VITALS
DIASTOLIC BLOOD PRESSURE: 82 MMHG | HEIGHT: 67 IN | SYSTOLIC BLOOD PRESSURE: 116 MMHG | TEMPERATURE: 97.5 F | WEIGHT: 205 LBS | BODY MASS INDEX: 32.18 KG/M2 | OXYGEN SATURATION: 98 % | HEART RATE: 78 BPM

## 2021-05-28 DIAGNOSIS — F50.819 BINGE EATING DISORDER: Primary | ICD-10-CM

## 2021-05-28 DIAGNOSIS — F41.9 ANXIETY: ICD-10-CM

## 2021-05-28 DIAGNOSIS — F32.0 MILD MAJOR DEPRESSION (H): ICD-10-CM

## 2021-05-28 DIAGNOSIS — E66.812 CLASS 2 OBESITY: ICD-10-CM

## 2021-05-28 PROCEDURE — 99214 OFFICE O/P EST MOD 30 MIN: CPT | Performed by: FAMILY MEDICINE

## 2021-05-28 RX ORDER — CLONAZEPAM 0.5 MG/1
0.5 TABLET, ORALLY DISINTEGRATING ORAL
Qty: 30 TABLET | Refills: 2 | Status: SHIPPED | OUTPATIENT
Start: 2021-05-28 | End: 2022-03-29

## 2021-05-28 RX ORDER — ESCITALOPRAM OXALATE 20 MG/1
20 TABLET ORAL DAILY
Qty: 90 TABLET | Refills: 3 | Status: SHIPPED | OUTPATIENT
Start: 2021-05-28 | End: 2021-11-24

## 2021-05-28 RX ORDER — ESCITALOPRAM OXALATE 20 MG/1
20 TABLET ORAL DAILY
Qty: 90 TABLET | Refills: 3 | Status: SHIPPED | OUTPATIENT
Start: 2021-05-28 | End: 2021-05-28

## 2021-05-28 RX ORDER — PHENTERMINE HYDROCHLORIDE 15 MG/1
15 CAPSULE ORAL EVERY MORNING
Qty: 30 CAPSULE | Refills: 3 | Status: SHIPPED | OUTPATIENT
Start: 2021-05-28 | End: 2021-08-20

## 2021-05-28 ASSESSMENT — PATIENT HEALTH QUESTIONNAIRE - PHQ9
10. IF YOU CHECKED OFF ANY PROBLEMS, HOW DIFFICULT HAVE THESE PROBLEMS MADE IT FOR YOU TO DO YOUR WORK, TAKE CARE OF THINGS AT HOME, OR GET ALONG WITH OTHER PEOPLE: SOMEWHAT DIFFICULT
SUM OF ALL RESPONSES TO PHQ QUESTIONS 1-9: 6
SUM OF ALL RESPONSES TO PHQ QUESTIONS 1-9: 6

## 2021-05-28 ASSESSMENT — ANXIETY QUESTIONNAIRES
1. FEELING NERVOUS, ANXIOUS, OR ON EDGE: MORE THAN HALF THE DAYS
7. FEELING AFRAID AS IF SOMETHING AWFUL MIGHT HAPPEN: SEVERAL DAYS
4. TROUBLE RELAXING: SEVERAL DAYS
GAD7 TOTAL SCORE: 8
GAD7 TOTAL SCORE: 8
7. FEELING AFRAID AS IF SOMETHING AWFUL MIGHT HAPPEN: SEVERAL DAYS
GAD7 TOTAL SCORE: 8
3. WORRYING TOO MUCH ABOUT DIFFERENT THINGS: SEVERAL DAYS
5. BEING SO RESTLESS THAT IT IS HARD TO SIT STILL: SEVERAL DAYS
2. NOT BEING ABLE TO STOP OR CONTROL WORRYING: SEVERAL DAYS
6. BECOMING EASILY ANNOYED OR IRRITABLE: SEVERAL DAYS

## 2021-05-28 ASSESSMENT — MIFFLIN-ST. JEOR: SCORE: 1667.5

## 2021-05-29 ASSESSMENT — PATIENT HEALTH QUESTIONNAIRE - PHQ9: SUM OF ALL RESPONSES TO PHQ QUESTIONS 1-9: 6

## 2021-05-29 ASSESSMENT — ANXIETY QUESTIONNAIRES: GAD7 TOTAL SCORE: 8

## 2021-06-02 ENCOUNTER — RECORDS - HEALTHEAST (OUTPATIENT)
Dept: ADMINISTRATIVE | Facility: CLINIC | Age: 36
End: 2021-06-02

## 2021-06-03 VITALS
SYSTOLIC BLOOD PRESSURE: 98 MMHG | WEIGHT: 175.19 LBS | BODY MASS INDEX: 28.28 KG/M2 | OXYGEN SATURATION: 99 % | DIASTOLIC BLOOD PRESSURE: 60 MMHG | HEART RATE: 76 BPM | RESPIRATION RATE: 16 BRPM

## 2021-06-04 NOTE — PROGRESS NOTES
Chief Complaint   Patient presents with     Medication Management     Pt needs refills     Ankle Injury       HPI: Patient presents today for 2 major issues.  The first is right ankle pain.  She fell off of a ladder 48 hours ago and sustained an inversion injury in her left ankle.  She was lifting drywall that time.  Since that time she is brought an over-the-counter brace and treated with over-the-counter medications.  She is noticed swelling and bruising.    She also has substantial history of anxiety.  She notes that she is taken clonazepam in the past and would like to have refills of this.  Her other physician is allegedly retiring.  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     In addition, she has psoriatic arthritis and sees rheumatology.  Is been stable.    ROS: 10 point system review complete notable for ankle pain which is acute, anxiety particularly when issues occur at home.    SH:    reports that she has never smoked. She has never used smokeless tobacco. She reports that she does not drink alcohol or use drugs.      FH: The Patient's family history is not on file.     Meds:  Kathya has a current medication list which includes the following prescription(s): omeprazole, clonazepam, hydroxyzine pamoate, prednisone, and sulfasalazine.    O:  BP 98/60   Pulse 76   Resp 16   Wt 175 lb 3 oz (79.5 kg)   SpO2 99%   BMI 27.44 kg/m    Alert conversant no acute distress  Skin pink and dry  Psych oriented improved good memory insight judgment  Neuro-moves all 4 extremities pupils are equal  Vascular-normal vascular function of the right lower extremity  Neuro-normal neurological activity the right lower extremity  Musculoskeletal- the left ankle has market ecchymosis and swelling with pain over the anterior talofibular ligament    X-ray-bony architecture is intact, there is soft tissue swelling laterally but no evidence of fracture displacement    A/P:   1. Pain in joint involving  ankle and foot, left  Severe ankle sprain.  Will immobilize in a cam walker and follow-up as needed.  Continue ibuprofen.  Ice and rest as needed  - XR Ankle Left 3 or More VWS; Future    2. Anxiety  We discussed her anxiety in detail.  Recommended counseling.  Declined lorazepam or benzodiazepines but will add Vistaril as a PRN basis.  - hydrOXYzine pamoate (VISTARIL) 25 MG capsule; Take 1 capsule (25 mg total) by mouth daily as needed for anxiety.  Dispense: 30 capsule; Refill: 0    Follow-up 1 year

## 2021-07-10 ENCOUNTER — RECORDS - HEALTHEAST (OUTPATIENT)
Dept: ADMINISTRATIVE | Facility: OTHER | Age: 36
End: 2021-07-10

## 2021-07-13 ENCOUNTER — VIRTUAL VISIT (OUTPATIENT)
Dept: FAMILY MEDICINE | Facility: CLINIC | Age: 36
End: 2021-07-13
Payer: COMMERCIAL

## 2021-07-13 DIAGNOSIS — E66.811 CLASS 1 OBESITY DUE TO EXCESS CALORIES WITH BODY MASS INDEX (BMI) OF 30.0 TO 30.9 IN ADULT, UNSPECIFIED WHETHER SERIOUS COMORBIDITY PRESENT: Primary | ICD-10-CM

## 2021-07-13 DIAGNOSIS — E66.09 CLASS 1 OBESITY DUE TO EXCESS CALORIES WITH BODY MASS INDEX (BMI) OF 30.0 TO 30.9 IN ADULT, UNSPECIFIED WHETHER SERIOUS COMORBIDITY PRESENT: Primary | ICD-10-CM

## 2021-07-13 DIAGNOSIS — Z71.3 NUTRITIONAL COUNSELING: ICD-10-CM

## 2021-07-13 PROCEDURE — 99203 OFFICE O/P NEW LOW 30 MIN: CPT | Mod: 95 | Performed by: DIETITIAN, REGISTERED

## 2021-07-13 NOTE — PROGRESS NOTES
Kathya Chino is a 35 year old female being evaluated for a billed video visit    How would you like to obtain AVS? My Chart   If dropped from the video visit, the video invitation should be resent to: Text: [phone]  Will anyone else be joining your video visit:  No    Video Start Time: 3:22 pm       Medical  Weight Loss Follow-Up Diet Evaluation  Assessment:  Kathya is presenting today for a follow up weight management nutrition consultation. Pt has had an initial appointment with Dr. Cooper.   Weight loss medication: Phentermine. Also prescribed the Topamax (to help with Diet Coke habit)   Current Weight: 194 lbs  BMI: There is no height or weight on file to calculate BMI.  Ideal body weight: 61.6 kg (135 lb 12.9 oz)  Adjusted ideal body weight: 72.2 kg (159 lb 1.3 oz)    Estimated RMR (Richardson-St Nelson equation):   1620 kcals x 1.3 (light active) = 2106 kcals (for weight maintenance)     Recommended Protein Intake: 60-80 grams of protein/day  Patient Active Problem List:  Patient Active Problem List   Diagnosis     CARDIOVASCULAR SCREENING; LDL GOAL LESS THAN 160     Psoriasis     Migraine with aura and without status migrainosus, not intractable     Endometriosis     Diabetes: No     Food Allergies/Food Intolerances:  Avocado-extreme gastritis    Vitamin/Mineral Supplements: Vitamin D 10,000 international unit(s)/day     Dietary Recall:  Breakfast: Nothing   Lunch:depends upon the week-eating out or  salad from the vending machine with french dressing  Dinner:de-stress-tends to emotional eat; hamburger with french fries and veggies or spaghetti with fruit and/or vegetable  Typical snacks: sweet tooth-anything that is either sweet or salty (rice pudding)  Eating out: 2 times/month   Beverages: Diet Pop, minimal water (needs to increase), Hot Water   Exercise: outdoor activity as much as possible (yardwork especially), does have an Elliptical Machine (however limited use), Hiking (on the weekends with her  [employer]   Nutrition Diagnosis:    Overweight/Obesity (NC 3.3) related to overeating and poor lifestyle habits as evidenced by patient's subjective statements (excessive energy intake) and BMI of 30.38 kg/m2.     Intervention:  1. Food and/or nutrient delivery: balanced meals, adequate protein   2. Nutrition education: 150 calorie Snack List, protein intake, carbohydrate consumption    3. Nutrition counseling: exercise    Monitoring/Evaluation:    Goals:  1. Focus on protein first, aiming for at least 60-80 grams of protein/day.   2. Choose snacks that are 100-150 calories if physically hungry, otherwise try doing an activity or hobby if it is stress or emotion related hunger.     Patient to follow up in 1 months(s) with bariatrician and prn with S[facility]ENE    Video Visit Details:     Type of Service: Video Visit  Video End Time: 3:56 pm   Originating Location: Work  Distant Location (Provider Location): [facility]    Platform Used for Video: Rosalva Kathleen MS SELENE LD

## 2021-08-11 ENCOUNTER — TRANSFERRED RECORDS (OUTPATIENT)
Dept: HEALTH INFORMATION MANAGEMENT | Facility: CLINIC | Age: 36
End: 2021-08-11

## 2021-08-11 LAB
ALT SERPL-CCNC: 13 LU/L (ref 5–35)
AST SERPL-CCNC: 20 U/L (ref 5–34)
CREATININE (EXTERNAL): 0.74 MG/DL (ref 0.5–1.3)

## 2021-08-20 ENCOUNTER — VIRTUAL VISIT (OUTPATIENT)
Dept: FAMILY MEDICINE | Facility: CLINIC | Age: 36
End: 2021-08-20
Payer: COMMERCIAL

## 2021-08-20 VITALS — BODY MASS INDEX: 28.66 KG/M2 | WEIGHT: 183 LBS

## 2021-08-20 DIAGNOSIS — N94.6 DYSMENORRHEA: ICD-10-CM

## 2021-08-20 DIAGNOSIS — F41.1 ANXIETY STATE: ICD-10-CM

## 2021-08-20 DIAGNOSIS — E66.3 OVERWEIGHT (BMI 25.0-29.9): Primary | ICD-10-CM

## 2021-08-20 DIAGNOSIS — G43.109 MIGRAINE WITH AURA AND WITHOUT STATUS MIGRAINOSUS, NOT INTRACTABLE: ICD-10-CM

## 2021-08-20 PROBLEM — Z13.6 CARDIOVASCULAR SCREENING; LDL GOAL LESS THAN 160: Status: RESOLVED | Noted: 2017-03-08 | Resolved: 2021-08-20

## 2021-08-20 PROBLEM — M25.50 PAIN IN JOINT, MULTIPLE SITES: Status: ACTIVE | Noted: 2021-08-20

## 2021-08-20 PROBLEM — E66.09 CLASS 1 OBESITY DUE TO EXCESS CALORIES WITH BODY MASS INDEX (BMI) OF 30.0 TO 30.9 IN ADULT, UNSPECIFIED WHETHER SERIOUS COMORBIDITY PRESENT: Status: ACTIVE | Noted: 2017-09-28

## 2021-08-20 PROBLEM — L91.8 SKIN TAG: Status: ACTIVE | Noted: 2021-07-09

## 2021-08-20 PROBLEM — L40.50 PSORIATIC ARTHRITIS (H): Status: ACTIVE | Noted: 2017-09-28

## 2021-08-20 PROBLEM — E66.811 CLASS 1 OBESITY DUE TO EXCESS CALORIES WITH BODY MASS INDEX (BMI) OF 30.0 TO 30.9 IN ADULT, UNSPECIFIED WHETHER SERIOUS COMORBIDITY PRESENT: Status: ACTIVE | Noted: 2017-09-28

## 2021-08-20 PROCEDURE — 99213 OFFICE O/P EST LOW 20 MIN: CPT | Mod: 95 | Performed by: FAMILY MEDICINE

## 2021-08-20 RX ORDER — PHENTERMINE HYDROCHLORIDE 30 MG/1
30 CAPSULE ORAL EVERY MORNING
Qty: 90 CAPSULE | Refills: 0 | Status: SHIPPED | OUTPATIENT
Start: 2021-08-20 | End: 2021-10-08

## 2021-08-20 RX ORDER — TOPIRAMATE SPINKLE 25 MG/1
25 CAPSULE ORAL 2 TIMES DAILY
COMMUNITY
End: 2021-08-20

## 2021-08-20 RX ORDER — TOPIRAMATE SPINKLE 25 MG/1
25 CAPSULE ORAL 2 TIMES DAILY
Qty: 90 CAPSULE | Refills: 1 | Status: SHIPPED | OUTPATIENT
Start: 2021-08-20 | End: 2021-10-08

## 2021-08-20 NOTE — PROGRESS NOTES
"Type of service:  Video Visit    Kathya Chino is a 35 year old female who is being evaluated via a billable video visit.      How would you like to obtain your AVS? MyChart  If dropped from the video visit, the video invitation should be resent by: Text to cell phone: [phone]  Will anyone else be joining your video visit? No    Video Start Time: 4:11 PM  Video End Time (time video stopped): 4:31 PM  Originating Location (pt. Location): Home  Distant Location (provider location):  [facility]   Platform used for Video Visit: [employer]    Assessment and Plan:     Overweight (BMI 25.0-29.9)  35 year old year old female in clinic today to discuss treatment of the following conditions through diet and lifestyle modification and weight loss:  1. Overweight (BMI 25.0-29.9)    2. Migraine with aura and without status migrainosus, not intractable    3. Dysmenorrhea      The patient's weight loss result since the last visit was successful based on weight loss and dietary changes.  Rate of weight loss has fluctuated.  Working to enjoy/drink more water.  Nutritionally restricting carbohydrates.  Effect of topiramate?  Trial of 25 mg or 75 mg?  Starting BMI was >30.    - continue phentermine / topiramate.     - 6 week follow-up   - reviewed nutrition.     Return in about 6 weeks (around 10/1/2021) for Weight loss follow-up visit.    Chief Complaint   Patient presents with     Weight Loss     Subjective  Patient presents for treatment of chronic, comorbid conditions using intensive therapeutic lifestyle interventions including nutrition, physical activity, and behavior management.   - she has been taking topiramate.  Increased cravings for sugary things.    She has eliminated diet coke (down from 6 cans per day.)  Starting to feel better.     - she has not gone off the medication.  She is concerned that she is still craving Diet Coke. More water in my system.  \"Very thirsty.\" More water.  Irritation " "in the stomach.  Some MINGO.  Late morning breakfast.  \"Premier protein with lunch.\"  Shaky: caffeine?  Less sugar and carb.    - working to add more protein.     - movement: \"every other day\" 30 minutes of exercise. Walking.     Reviewed history:  Allergies   Problems             Objective:  Wt 83 kg (183 lb)   LMP 07/20/2021 (Approximate)   Breastfeeding No   BMI 28.66 kg/m    Change from start of program: % Weight Change: -5.67 %       Vitals - Patient Reported  Weight (Patient Reported): 83 kg (183 lb)    Body mass index is 28.66 kg/m .  Patient's last menstrual period was 07/20/2021 (approximate).  Physical Exam  Nursing note reviewed.   Constitutional:       General: She is not in acute distress.     Appearance: Normal appearance. She is not ill-appearing.   HENT:      Head: Normocephalic and atraumatic.   Eyes:      Extraocular Movements: Extraocular movements intact.      Conjunctiva/sclera: Conjunctivae normal.   Pulmonary:      Effort: Pulmonary effort is normal.   Neurological:      Mental Status: She is alert and oriented to person, place, and time.   Psychiatric:         Attention and Perception: Attention normal.         Mood and Affect: Mood normal.         Speech: Speech normal.         Thought Content: Thought content normal.         This note has been dictated using voice recognition software. Any grammatical or context distortions are unintentional and inherent to the software    "

## 2021-08-20 NOTE — ASSESSMENT & PLAN NOTE
35 year old year old female in clinic today to discuss treatment of the following conditions through diet and lifestyle modification and weight loss:  1. Overweight (BMI 25.0-29.9)    2. Migraine with aura and without status migrainosus, not intractable    3. Dysmenorrhea      The patient's weight loss result since the last visit was successful based on weight loss and dietary changes.  Rate of weight loss has fluctuated.  Working to enjoy/drink more water.  Nutritionally restricting carbohydrates.  Effect of topiramate?  Trial of 25 mg or 75 mg?  Starting BMI was >30.    - continue phentermine / topiramate.     - 6 week follow-up   - reviewed nutrition.

## 2021-10-08 ENCOUNTER — VIRTUAL VISIT (OUTPATIENT)
Dept: FAMILY MEDICINE | Facility: CLINIC | Age: 36
End: 2021-10-08
Payer: COMMERCIAL

## 2021-10-08 VITALS — BODY MASS INDEX: 26.94 KG/M2 | WEIGHT: 172 LBS

## 2021-10-08 DIAGNOSIS — E66.3 OVERWEIGHT (BMI 25.0-29.9): ICD-10-CM

## 2021-10-08 DIAGNOSIS — F33.0 MAJOR DEPRESSIVE DISORDER, RECURRENT EPISODE, MILD (H): Primary | ICD-10-CM

## 2021-10-08 PROCEDURE — 99214 OFFICE O/P EST MOD 30 MIN: CPT | Mod: 95 | Performed by: FAMILY MEDICINE

## 2021-10-08 RX ORDER — TOPIRAMATE SPINKLE 25 MG/1
25 CAPSULE ORAL DAILY
Qty: 90 CAPSULE | Refills: 1 | Status: SHIPPED | OUTPATIENT
Start: 2021-10-08 | End: 2022-01-21

## 2021-10-08 RX ORDER — PHENTERMINE HYDROCHLORIDE 30 MG/1
30 CAPSULE ORAL EVERY MORNING
Qty: 90 CAPSULE | Refills: 0 | Status: SHIPPED | OUTPATIENT
Start: 2021-10-08 | End: 2021-11-24

## 2021-10-08 RX ORDER — BUPROPION HYDROCHLORIDE 150 MG/1
150 TABLET ORAL EVERY MORNING
Qty: 30 TABLET | Refills: 1 | Status: SHIPPED | OUTPATIENT
Start: 2021-10-08 | End: 2021-11-24

## 2021-10-08 NOTE — PROGRESS NOTES
"Type of service:  Video Visit    Kathya Chino is a 35 year old female who is being evaluated via a billable video visit.      How would you like to obtain your AVS? MyChart  If dropped from the video visit, the video invitation should be resent by: Text to cell phone: [phone]  Will anyone else be joining your video visit? No    Video Start Time: 12:06 PM  Video End Time (time video stopped): 12:29 PM  Originating Location (pt. Location): Home  Distant Location (provider location):  [facility]   Platform used for Video Visit: [employer]    Assessment and Plan:     Overweight (BMI 25.0-29.9)  35 year old year old female in clinic today to discuss treatment of the following conditions through diet and lifestyle modification and weight loss:  1. Major depressive disorder, recurrent episode, mild (H)    2. Overweight (BMI 25.0-29.9)      The patient's weight loss result since the last visit was successful based on ongoing weight loss.  She is eating well and thinking about the patterns of eating.  She is identified that anxiety will sometimes result in her \"binging\" and she has been working to recognize triggers.  Mental health as discussed elsewhere.  She is down approximately 15% over the summer months.  Energy okay.  No obvious side effects.  She is on the topiramate capsules which seems to give a longer/more effective effect.    Major depressive disorder, recurrent episode, mild (H)  This was my first conversation with this patient about anxiety/depression.  Recent PTSD experience with COVID-19 and death of family member.  Job stresses as well.  She is on maximal doses of Escitalopram.  In the past, she has had side effects and lack of efficacy from both Paxil and sertraline.  Infrequent use of clonazepam.  Most recent refill was back in May.  -Continue escitalopram 20 mg.  -Augment with bupropion extended release 150 mg.  We discussed that this can be anxiety provoking, in particular in " "combination with phentermine.  She understands this potential side effect.  If ineffective, consider trial of buspirone or aripiprazole as an alternative.  Consider SNRI at this has not been tried as well.  -Follow-up in 3-4 weeks (video format okay).    31 minutes spent on the date of the encounter doing chart review, history and exam, documentation and further activities per the note    Return in about 4 weeks (around 11/5/2021) for Anxiety focused visit, (video visit).    Chief Complaint   Patient presents with     Weight Loss     Subjective  Patient presents for treatment of chronic, comorbid conditions using intensive therapeutic lifestyle interventions including nutrition, physical activity, and behavior management.   - successes: doing well despite anxiety/depression.  Topiramate helps to avoid sleeps.  Less in the evening.  Terry grahams \"doesn't taste right.\"  Binging gets worse with anxiety.  \"I have coped in the past. Like a habit.\"   - no diet coke for ~3 months.    Anxiety and depression:   - history of anxiety.  History of PTSD.  Increasingly struggling with symptoms of depression.  She has been on the current dose of 20 mg for ~12+ months.  Life has been more stressful.  Clonazepam is used rarely for sleep.  She has bene having panic attacks every 1-2 days.  Racing heart, lightheadedness, sense of feeling like she would pass out.  Struggles to communicate.     - lightheaded on paxil.  Zoloft ineffective.    - she does not relate to medication.      Reviewed history:                Objective:  Wt 78 kg (172 lb)   LMP 09/17/2021   Breastfeeding No   BMI 26.94 kg/m    Change from start of program:       Vitals - Patient Reported  Weight (Patient Reported): 78 kg (172 lb)    Body mass index is 26.94 kg/m .  Patient's last menstrual period was 09/17/2021.  Physical Exam  Nursing note reviewed.   Constitutional:       General: She is not in acute distress.     Appearance: Normal appearance. She is not " ill-appearing.   HENT:      Head: Normocephalic and atraumatic.   Eyes:      Extraocular Movements: Extraocular movements intact.      Conjunctiva/sclera: Conjunctivae normal.   Pulmonary:      Effort: Pulmonary effort is normal.   Neurological:      Mental Status: She is alert and oriented to person, place, and time.   Psychiatric:         Attention and Perception: Attention normal.         Mood and Affect: Mood normal.         Speech: Speech normal.         Thought Content: Thought content normal.         This note has been dictated using voice recognition software. Any grammatical or context distortions are unintentional and inherent to the software

## 2021-10-08 NOTE — ASSESSMENT & PLAN NOTE
This was my first conversation with this patient about anxiety/depression.  Recent PTSD experience with COVID-19 and death of family member.  Job stresses as well.  She is on maximal doses of Escitalopram.  In the past, she has had side effects and lack of efficacy from both Paxil and sertraline.  Infrequent use of clonazepam.  Most recent refill was back in May.  -Continue escitalopram 20 mg.  -Augment with bupropion extended release 150 mg.  We discussed that this can be anxiety provoking, in particular in combination with phentermine.  She understands this potential side effect.  If ineffective, consider trial of buspirone or aripiprazole as an alternative.  Consider SNRI at this has not been tried as well.  -Follow-up in 3-4 weeks (video format okay).

## 2021-10-08 NOTE — ASSESSMENT & PLAN NOTE
"35 year old year old female in clinic today to discuss treatment of the following conditions through diet and lifestyle modification and weight loss:  1. Major depressive disorder, recurrent episode, mild (H)    2. Overweight (BMI 25.0-29.9)      The patient's weight loss result since the last visit was successful based on ongoing weight loss.  She is eating well and thinking about the patterns of eating.  She is identified that anxiety will sometimes result in her \"binging\" and she has been working to recognize triggers.  Mental health as discussed elsewhere.  She is down approximately 15% over the summer months.  Energy okay.  No obvious side effects.  She is on the topiramate capsules which seems to give a longer/more effective effect.  "

## 2021-10-09 ENCOUNTER — HEALTH MAINTENANCE LETTER (OUTPATIENT)
Age: 36
End: 2021-10-09

## 2021-11-23 ASSESSMENT — ANXIETY QUESTIONNAIRES
3. WORRYING TOO MUCH ABOUT DIFFERENT THINGS: SEVERAL DAYS
7. FEELING AFRAID AS IF SOMETHING AWFUL MIGHT HAPPEN: SEVERAL DAYS
6. BECOMING EASILY ANNOYED OR IRRITABLE: SEVERAL DAYS
7. FEELING AFRAID AS IF SOMETHING AWFUL MIGHT HAPPEN: SEVERAL DAYS
2. NOT BEING ABLE TO STOP OR CONTROL WORRYING: MORE THAN HALF THE DAYS
1. FEELING NERVOUS, ANXIOUS, OR ON EDGE: MORE THAN HALF THE DAYS
GAD7 TOTAL SCORE: 9
5. BEING SO RESTLESS THAT IT IS HARD TO SIT STILL: SEVERAL DAYS
8. IF YOU CHECKED OFF ANY PROBLEMS, HOW DIFFICULT HAVE THESE MADE IT FOR YOU TO DO YOUR WORK, TAKE CARE OF THINGS AT HOME, OR GET ALONG WITH OTHER PEOPLE?: SOMEWHAT DIFFICULT
4. TROUBLE RELAXING: SEVERAL DAYS
GAD7 TOTAL SCORE: 9
GAD7 TOTAL SCORE: 9

## 2021-11-23 ASSESSMENT — PATIENT HEALTH QUESTIONNAIRE - PHQ9
SUM OF ALL RESPONSES TO PHQ QUESTIONS 1-9: 6
10. IF YOU CHECKED OFF ANY PROBLEMS, HOW DIFFICULT HAVE THESE PROBLEMS MADE IT FOR YOU TO DO YOUR WORK, TAKE CARE OF THINGS AT HOME, OR GET ALONG WITH OTHER PEOPLE: SOMEWHAT DIFFICULT
SUM OF ALL RESPONSES TO PHQ QUESTIONS 1-9: 6

## 2021-11-24 ENCOUNTER — VIRTUAL VISIT (OUTPATIENT)
Dept: FAMILY MEDICINE | Facility: CLINIC | Age: 36
End: 2021-11-24
Payer: COMMERCIAL

## 2021-11-24 DIAGNOSIS — E66.3 OVERWEIGHT (BMI 25.0-29.9): ICD-10-CM

## 2021-11-24 DIAGNOSIS — F33.0 MAJOR DEPRESSIVE DISORDER, RECURRENT EPISODE, MILD (H): ICD-10-CM

## 2021-11-24 DIAGNOSIS — L40.50 PSORIATIC ARTHRITIS (H): ICD-10-CM

## 2021-11-24 DIAGNOSIS — F32.0 MILD MAJOR DEPRESSION (H): ICD-10-CM

## 2021-11-24 DIAGNOSIS — F41.9 ANXIETY: ICD-10-CM

## 2021-11-24 PROCEDURE — 99214 OFFICE O/P EST MOD 30 MIN: CPT | Mod: 95 | Performed by: FAMILY MEDICINE

## 2021-11-24 RX ORDER — ESCITALOPRAM OXALATE 20 MG/1
20 TABLET ORAL DAILY
Qty: 90 TABLET | Refills: 3 | COMMUNITY
Start: 2021-11-24 | End: 2022-12-19

## 2021-11-24 RX ORDER — BUPROPION HYDROCHLORIDE 300 MG/1
300 TABLET ORAL EVERY MORNING
Qty: 30 TABLET | Refills: 2 | Status: SHIPPED | OUTPATIENT
Start: 2021-11-24 | End: 2021-12-01

## 2021-11-24 RX ORDER — PHENTERMINE HYDROCHLORIDE 30 MG/1
30 CAPSULE ORAL EVERY MORNING
Qty: 90 CAPSULE | Refills: 0 | Status: SHIPPED | OUTPATIENT
Start: 2021-11-24 | End: 2021-12-22

## 2021-11-24 ASSESSMENT — ANXIETY QUESTIONNAIRES: GAD7 TOTAL SCORE: 9

## 2021-11-24 ASSESSMENT — PATIENT HEALTH QUESTIONNAIRE - PHQ9: SUM OF ALL RESPONSES TO PHQ QUESTIONS 1-9: 6

## 2021-11-24 NOTE — PROGRESS NOTES
Type of service:  Video Visit    Kathya Chino is a 35 year old female who is being evaluated via a billable video visit.      How would you like to obtain your AVS? MyChart  If dropped from the video visit, the video invitation should be resent by: Text to cell phone: [phone]  Will anyone else be joining your video visit? No    Video Start Time: 1:02 PM  Video End Time (time video stopped): 1:25 PM  Originating Location (pt. Location): Home  Distant Location (provider location):  [facility]   Platform used for Video Visit: [employer]    Assessment and Plan:     Major depressive disorder, recurrent episode, mild (H)  Anxiety is the more problematic that depressive symptoms.  Wellbutrin helpful.  Continue current dose.  Refill sent to pharmacy.     Overweight (BMI 25.0-29.9)  35 year old year old female in clinic today to discuss treatment of the following conditions through diet and lifestyle modification and weight loss:  1. Major depressive disorder, recurrent episode, mild (H)    2. Overweight (BMI 25.0-29.9)    3. Anxiety    4. Mild major depression (H)      The patient's weight loss result since the last visit was successful based on weight stability.  Anxiety improved.  Better decisions.  Weight continues to decrease.  Now down ~50 lbs.   - continue phentermine / topiramate.     - headaches improved through topiramate (?).     - no change to medications.    - 3 month follow-up    Psoriatic arthritis (H)  Doing well. Pain controlled.       Return in about 3 months (around 2/24/2022) for Weight loss follow-up visit.    Chief Complaint   Patient presents with     Anxiety     Follow UP     Weight Loss     Follow UP     Subjective  Patient presents for treatment of chronic, comorbid conditions using intensive therapeutic lifestyle interventions including nutrition, physical activity, and behavior management.   - successes: Wellbutrin has been helpful with racing thoughts. Less anxiety.   "No side effects.  Anxiety improved.  Less us of clonazepam.  She wonders if lexapro should be decreased.  Symptoms of depression have subsided.  Weight: she has not noticed a change in appetite.  She feels like her thoughts are slower.  \"Able to think clearer.\"  Sleep is better.  Less binging.  She has lost weight. Most    - struggles: \"doing okay.\"    - movement: more stamina.  \"Body feels better.\"    - nutritional plan: doing well.   - hunger/cravings: controlled.   - medication benefits: helpful. side effects: none   - headaches: improved on topiramate.    - \"My quality of life has improved.\"    Reviewed history:   Meds  Problems            Objective:  LMP 11/10/2021 (Approximate)   Breastfeeding No   Change from start of program:         Vitals - Patient Reported  Weight (Patient Reported): 73.9 kg (163 lb)        There is no height or weight on file to calculate BMI.  Patient's last menstrual period was 11/10/2021 (approximate).  Physical Exam  Nursing note reviewed.   Constitutional:       General: She is not in acute distress.     Appearance: Normal appearance. She is not ill-appearing.   HENT:      Head: Normocephalic and atraumatic.   Eyes:      Extraocular Movements: Extraocular movements intact.      Conjunctiva/sclera: Conjunctivae normal.   Pulmonary:      Effort: Pulmonary effort is normal.   Neurological:      Mental Status: She is alert and oriented to person, place, and time.   Psychiatric:         Attention and Perception: Attention normal.         Mood and Affect: Mood normal.         Speech: Speech normal.         Thought Content: Thought content normal.         This note has been dictated using voice recognition software. Any grammatical or context distortions are unintentional and inherent to the software    "

## 2021-11-24 NOTE — ASSESSMENT & PLAN NOTE
35 year old year old female in clinic today to discuss treatment of the following conditions through diet and lifestyle modification and weight loss:  1. Major depressive disorder, recurrent episode, mild (H)    2. Overweight (BMI 25.0-29.9)    3. Anxiety    4. Mild major depression (H)      The patient's weight loss result since the last visit was successful based on weight stability.  Anxiety improved.  Better decisions.  Weight continues to decrease.  Now down ~50 lbs.   - continue phentermine / topiramate.     - headaches improved through topiramate (?).     - no change to medications.    - 3 month follow-up

## 2021-11-24 NOTE — ASSESSMENT & PLAN NOTE
Anxiety is the more problematic that depressive symptoms.  Wellbutrin helpful.  Continue current dose.  Refill sent to pharmacy.

## 2021-12-01 DIAGNOSIS — F33.0 MAJOR DEPRESSIVE DISORDER, RECURRENT EPISODE, MILD (H): Primary | ICD-10-CM

## 2021-12-01 RX ORDER — BUPROPION HYDROCHLORIDE 150 MG/1
150 TABLET ORAL EVERY MORNING
Qty: 30 TABLET | Refills: 1 | Status: SHIPPED | OUTPATIENT
Start: 2021-12-01 | End: 2022-02-07

## 2021-12-20 DIAGNOSIS — E66.3 OVERWEIGHT (BMI 25.0-29.9): ICD-10-CM

## 2021-12-22 RX ORDER — PHENTERMINE HYDROCHLORIDE 30 MG/1
CAPSULE ORAL
Qty: 90 CAPSULE | Refills: 0 | Status: SHIPPED | OUTPATIENT
Start: 2021-12-22 | End: 2022-01-21

## 2021-12-22 NOTE — TELEPHONE ENCOUNTER
Routing refill request to provider for review/approval because:  Drug not on the G refill protocol   Controlled Substance Request.    Last Written Prescription Date:  11/24/2021  Last Fill Quantity: 90,  # refills: 0   Last office visit provider:  11/24/2021 with Dr Cooper.    Requested Prescriptions   Pending Prescriptions Disp Refills     phentermine (ADIPEX-P) 30 MG capsule [Pharmacy Med Name: PHENTERMINE HCL 30MG CAPSULES] 90 capsule      Sig: TAKE 1 CAPSULE(30 MG) BY MOUTH EVERY MORNING       There is no refill protocol information for this order          Marie Hernandez 12/22/21 12:03 AM

## 2022-01-21 ENCOUNTER — VIRTUAL VISIT (OUTPATIENT)
Dept: FAMILY MEDICINE | Facility: CLINIC | Age: 37
End: 2022-01-21
Payer: COMMERCIAL

## 2022-01-21 DIAGNOSIS — L40.50 PSORIATIC ARTHRITIS (H): ICD-10-CM

## 2022-01-21 DIAGNOSIS — E66.3 OVERWEIGHT (BMI 25.0-29.9): ICD-10-CM

## 2022-01-21 DIAGNOSIS — Z82.41 FAMILY HISTORY OF SUDDEN CARDIAC DEATH IN FATHER: ICD-10-CM

## 2022-01-21 DIAGNOSIS — F43.21 GRIEF: ICD-10-CM

## 2022-01-21 DIAGNOSIS — F33.0 MAJOR DEPRESSIVE DISORDER, RECURRENT EPISODE, MILD (H): ICD-10-CM

## 2022-01-21 DIAGNOSIS — Z82.41 FAMILY HISTORY OF SUDDEN CARDIAC DEATH IN BROTHER: Primary | ICD-10-CM

## 2022-01-21 PROCEDURE — 99215 OFFICE O/P EST HI 40 MIN: CPT | Mod: 95 | Performed by: FAMILY MEDICINE

## 2022-01-21 RX ORDER — ALPRAZOLAM 0.5 MG
0.5 TABLET ORAL
Qty: 10 TABLET | Refills: 0 | Status: SHIPPED | OUTPATIENT
Start: 2022-01-21 | End: 2022-03-29

## 2022-01-21 RX ORDER — PHENTERMINE HYDROCHLORIDE 30 MG/1
30 CAPSULE ORAL EVERY MORNING
Qty: 30 CAPSULE | Refills: 0 | Status: SHIPPED | OUTPATIENT
Start: 2022-01-21 | End: 2022-02-07

## 2022-01-21 ASSESSMENT — ANXIETY QUESTIONNAIRES
1. FEELING NERVOUS, ANXIOUS, OR ON EDGE: NEARLY EVERY DAY
IF YOU CHECKED OFF ANY PROBLEMS ON THIS QUESTIONNAIRE, HOW DIFFICULT HAVE THESE PROBLEMS MADE IT FOR YOU TO DO YOUR WORK, TAKE CARE OF THINGS AT HOME, OR GET ALONG WITH OTHER PEOPLE: VERY DIFFICULT
GAD7 TOTAL SCORE: 20
5. BEING SO RESTLESS THAT IT IS HARD TO SIT STILL: MORE THAN HALF THE DAYS
6. BECOMING EASILY ANNOYED OR IRRITABLE: NEARLY EVERY DAY
3. WORRYING TOO MUCH ABOUT DIFFERENT THINGS: NEARLY EVERY DAY
7. FEELING AFRAID AS IF SOMETHING AWFUL MIGHT HAPPEN: NEARLY EVERY DAY
2. NOT BEING ABLE TO STOP OR CONTROL WORRYING: NEARLY EVERY DAY

## 2022-01-21 ASSESSMENT — PATIENT HEALTH QUESTIONNAIRE - PHQ9
SUM OF ALL RESPONSES TO PHQ QUESTIONS 1-9: 15
5. POOR APPETITE OR OVEREATING: NEARLY EVERY DAY

## 2022-01-21 NOTE — ASSESSMENT & PLAN NOTE
In process of grieving following death of her brother.  Discussed sleep. Will utilize xanax for the next 1-2 weeks.  She will hold clonazepam.  Wellbutrin helps but with jitters at 300mg.  For now will continue wellbutrin xl 150mg for now.  Continue escitalopram.  Will plan to drop phentermine and increase wellbutrin in 30 or 60 days.

## 2022-01-21 NOTE — ASSESSMENT & PLAN NOTE
Doing well with weight loss.  Not on topiramate.  More cravings for sugar (more emotional).  She describes side effects while topiramate including shortness of breath.  They then resolved.  It did reduce her cravings for sugar.  I consider this more of an intolerance than an actual side effect.  We could consider restart of topiramate in the future with copious amounts of water.  I wonder if there is some type of blood electrolyte abnormality as topiramate can shift chloride levels.   - continue phentermine at 30 mg.  Will consider decreasing to 15 mg in 1-2 months.

## 2022-01-21 NOTE — PROGRESS NOTES
Type of service:  Video Visit    Kathya Chino is a 36 year old female who is being evaluated via a billable video visit.      How would you like to obtain your AVS? MyChart  If dropped from the video visit, the video invitation should be resent by: Text to cell phone: [phone]  Will anyone else be joining your video visit? No    Video Start Time: 1:45 PM  Video End Time (time video stopped): 2:31 PM  Originating Location (pt. Location): Home  Distant Location (provider location):  [facility]   Platform used for Video Visit: [email or web address]    Assessment/Plan:    Major depressive disorder, recurrent episode, mild (H)  In process of grieving following death of her brother.  Discussed sleep. Will utilize xanax for the next 1-2 weeks.  She will hold clonazepam.  Wellbutrin helps but with jitters at 300mg.  For now will continue wellbutrin xl 150mg for now.  Continue escitalopram.  Will plan to drop phentermine and increase wellbutrin in 30 or 60 days.      Overweight (BMI 25.0-29.9)  Doing well with weight loss.  Not on topiramate.  More cravings for sugar (more emotional).  She describes side effects while topiramate including shortness of breath.  They then resolved.  It did reduce her cravings for sugar.  I consider this more of an intolerance than an actual side effect.  We could consider restart of topiramate in the future with copious amounts of water.  I wonder if there is some type of blood electrolyte abnormality as topiramate can shift chloride levels.   - continue phentermine at 30 mg.  Will consider decreasing to 15 mg in 1-2 months.     Psoriatic arthritis (H)  Stable.     Family history of sudden cardiac death in brother  This patient's brother  in May 2022.  He was found in his home.  Autopsy is pending but working diagnosis at this time is that he has sudden cardiac death.  This also affected this patient's father last year he was 57 at the time.  The patient is  "concerned about her own risk of heart disease/heart attacks.  She has not had lipids.  She is working to lose weight.  She is currently on phentermine which was started at a time when we did not think she had any cardiac risk factors.  She is been tolerant of this medicine without any problems.  She has a son.  - Referral placed to cardiology for discussion of risk factors.  I \"tag\" genetics.  I would consider having her see Dr. Baumann or Dr. Hennessy here at the [facility] in [place] to talk over risk factors as well.  She has not, to my knowledge, and any cardiovascular risk factors such as EKG or echocardiogram up until now.    45 minutes spent on the date of the encounter doing chart review, history and exam, documentation and further activities per the note    Return in about 3 months (around 4/21/2022) for Annual exam, Weight loss follow-up visit.    Rob Cooper MD  _______________________________    Chief Complaint   Patient presents with     Follow Up     anxiety, depression, wt loss     Subjective: Kathya Chino is a 36 year old year old female who returns to clinic for the following chronic complaints/concerns:     Anxiety/depression:   - brother passed away last week. Age 35.  Concerns for cardiac etiology. Autopsy pending.     - she is having struggles sleeping. \"I feel awful.\"  Trauma upon trauma.  \"I cannot function.\"  She had already been taking melatonin.  6 mg/night.  Benadryl makes her groggy the following morning and can be habit forming for her. clonazapam helps take the edge off but does not help with sleep.  Bedtime is 10pm.  Audiobooks.  She has had the experience of decompensation when not sleeping.  In the past, she is gotten patterns of poor sleep if she starts to have hallucinations and traumatic ulcerations of mood.   -now worried that it will happen to her (her father also had sudden cardiac death).  She also worries about kids.  She is hypervigilant.    - more " craving lately with increased stress   - support: spouse and family.     - prior to last week, she was doing better.  Wellburin has been helping but she had tremors at 300 mg of wellbutrin.     - She has utilized the EAP.     Review of Systems   Constitutional:        Review of systems negative except as noted in the HPI.   All other systems reviewed and are negative.       Reviewed history:     Med Hx           Objective:    vitals were not taken for this visit.   Physical Exam  Nursing note reviewed.   Constitutional:       General: She is not in acute distress.     Appearance: Normal appearance. She is not ill-appearing.   HENT:      Head: Normocephalic and atraumatic.   Eyes:      Extraocular Movements: Extraocular movements intact.      Conjunctiva/sclera: Conjunctivae normal.   Pulmonary:      Effort: Pulmonary effort is normal.   Neurological:      Mental Status: She is alert and oriented to person, place, and time.   Psychiatric:         Attention and Perception: Attention normal.         Mood and Affect: Mood normal.         Speech: Speech normal.         Thought Content: Thought content normal.             PHQ 1/21/2022   PHQ-9 Total Score 15   Q9: Thoughts of better off dead/self-harm past 2 weeks Not at all     ZACK-7 SCORE 1/21/2022   Total Score -   Total Score 20     No flowsheet data found.  No flowsheet data found.  No results found for this or any previous visit (from the past 48 hour(s)).  No results found for this visit on 01/21/22.    This note has been dictated using voice recognition software. Any grammatical or context distortions are unintentional and inherent to the software

## 2022-01-21 NOTE — ASSESSMENT & PLAN NOTE
"This patient's brother  in May 2022.  He was found in his home.  Autopsy is pending but working diagnosis at this time is that he has sudden cardiac death.  This also affected this patient's father last year he was 57 at the time.  The patient is concerned about her own risk of heart disease/heart attacks.  She has not had lipids.  She is working to lose weight.  She is currently on phentermine which was started at a time when we did not think she had any cardiac risk factors.  She is been tolerant of this medicine without any problems.  She has a son.  - Referral placed to cardiology for discussion of risk factors.  I \"tag\" genetics.  I would consider having her see Dr. Baumann or Dr. Hennessy here at the [facility] in [place] to talk over risk factors as well.  She has not, to my knowledge, and any cardiovascular risk factors such as EKG or echocardiogram up until now.  "

## 2022-01-22 ASSESSMENT — ANXIETY QUESTIONNAIRES: GAD7 TOTAL SCORE: 20

## 2022-01-24 ENCOUNTER — TRANSFERRED RECORDS (OUTPATIENT)
Dept: HEALTH INFORMATION MANAGEMENT | Facility: CLINIC | Age: 37
End: 2022-01-24
Payer: COMMERCIAL

## 2022-01-24 LAB
ALT SERPL-CCNC: 10 IU/L (ref 5–35)
AST SERPL-CCNC: 15 U/L (ref 5–34)
CREATININE (EXTERNAL): 0.65 MG/DL (ref 0.5–1.3)

## 2022-02-04 ENCOUNTER — MYC MEDICAL ADVICE (OUTPATIENT)
Dept: FAMILY MEDICINE | Facility: CLINIC | Age: 37
End: 2022-02-04
Payer: COMMERCIAL

## 2022-02-04 DIAGNOSIS — F33.0 MAJOR DEPRESSIVE DISORDER, RECURRENT EPISODE, MILD (H): ICD-10-CM

## 2022-02-07 RX ORDER — BUPROPION HYDROCHLORIDE 300 MG/1
300 TABLET ORAL EVERY MORNING
Qty: 90 TABLET | Refills: 1 | Status: SHIPPED | OUTPATIENT
Start: 2022-02-07 | End: 2022-03-29

## 2022-02-10 ENCOUNTER — OFFICE VISIT (OUTPATIENT)
Dept: CARDIOLOGY | Facility: CLINIC | Age: 37
End: 2022-02-10
Attending: FAMILY MEDICINE
Payer: COMMERCIAL

## 2022-02-10 VITALS
HEART RATE: 76 BPM | BODY MASS INDEX: 23.86 KG/M2 | OXYGEN SATURATION: 99 % | TEMPERATURE: 98.5 F | RESPIRATION RATE: 12 BRPM | WEIGHT: 152 LBS | SYSTOLIC BLOOD PRESSURE: 106 MMHG | HEIGHT: 67 IN | DIASTOLIC BLOOD PRESSURE: 66 MMHG

## 2022-02-10 DIAGNOSIS — Z82.41 FAMILY HISTORY OF SUDDEN CARDIAC DEATH IN FATHER: ICD-10-CM

## 2022-02-10 DIAGNOSIS — Z82.41 FAMILY HISTORY OF SUDDEN CARDIAC DEATH IN BROTHER: ICD-10-CM

## 2022-02-10 LAB
ATRIAL RATE - MUSE: 67 BPM
DIASTOLIC BLOOD PRESSURE - MUSE: 66 MMHG
INTERPRETATION ECG - MUSE: NORMAL
P AXIS - MUSE: 31 DEGREES
PR INTERVAL - MUSE: 130 MS
QRS DURATION - MUSE: 80 MS
QT - MUSE: 400 MS
QTC - MUSE: 422 MS
R AXIS - MUSE: 22 DEGREES
SYSTOLIC BLOOD PRESSURE - MUSE: 106 MMHG
T AXIS - MUSE: 46 DEGREES
VENTRICULAR RATE- MUSE: 67 BPM

## 2022-02-10 PROCEDURE — 93000 ELECTROCARDIOGRAM COMPLETE: CPT | Performed by: INTERNAL MEDICINE

## 2022-02-10 PROCEDURE — 99204 OFFICE O/P NEW MOD 45 MIN: CPT | Performed by: INTERNAL MEDICINE

## 2022-02-10 RX ORDER — ADALIMUMAB 40MG/0.4ML
KIT SUBCUTANEOUS
COMMUNITY
Start: 2022-02-04

## 2022-02-10 RX ORDER — NEEDLES, SAFETY 22GX1 1/2"
NEEDLE, DISPOSABLE MISCELLANEOUS
COMMUNITY
Start: 2022-01-26

## 2022-02-10 RX ORDER — FOLIC ACID 1 MG/1
TABLET ORAL
COMMUNITY
Start: 2022-01-24

## 2022-02-10 RX ORDER — METHOTREXATE 25 MG/ML
INJECTION, SOLUTION INTRA-ARTERIAL; INTRAMUSCULAR; INTRAVENOUS
COMMUNITY
Start: 2022-01-24

## 2022-02-10 ASSESSMENT — MIFFLIN-ST. JEOR: SCORE: 1422.1

## 2022-02-10 NOTE — LETTER
2/10/2022    Rob Cooper MD  [address]  [facility] [address]    RE: Kathya Chino       Dear Colleague,     I had the pleasure of seeing Kathya Chino in the [facility].      [facility]  [phone]    Thank you, Dr. Cooper, for asking the [facility] team to see Ms. Kathya Chino to evaluate dramatically positive family history of sudden death.      Assessment/Recommendations   Patient with no symptoms other than shortness of breath with activity which she feels is a little more dramatic than she would expect.  She had a brother who recently  suddenly with atherosclerotic coronary vascular disease noted on his death certificate but the full autopsy is not yet provided.  Father  suddenly at age 57 without an autopsy of presumed myocardial infarction.  Patient is quite concerned regarding her risk of family history and her lipids are reasonable, she is not diabetic, and she does not smoke cigarettes.  She is not treated for hypertension.    Given the dramatic family history, I think would be quite reasonable to perform coronary CT angiography to ensure that she does not have significant plaque or any plaque.  If she did have plaque, I would be very aggressive in treating her LDL cholesterol given the likelihood that both her brother and her father  from ruptured plaque.  She is very agreeable.    She has never had a twelve-lead ECG so we will do that today as well.    We will get back to her with the results of the CT angiogram.  If she has normal coronary arteries, I would have a low threshold for an echocardiogram to rule out hypertrophic cardiomyopathy and asked to see the autopsy for her brother to see if he had any evidence for hypertrophic cardiomyopathy.    Thank you for allowing us to participate in her care.       History of Present Illness/Subjective    Ms. Kathya Chino is a 36 year old female with no known  "cardiac history but dramatically positive family history.  Her father  at age 57 suddenly.  He did not have an autopsy but it was presumed myocardial infarction.  Her brother  at age 35, also suddenly.  He had a viral illness for a few weeks but really no other symptoms and was found at college.  Had an autopsy and the death certificate reads atherosclerotic coronary vascular disease but they have not seen the autopsy yet but have requested them.  The patient's grandfather and her father side also  52.    The patient denies any chest discomfort but has shortness of breath when she does her workouts.  She does attend and take it feels like she is a bit more short of breath than she should be.  She denies any orthopnea, paroxysmal nocturnal dyspnea, peripheral edema, syncope or near syncopal episodes but does have palpitation a couple of times every 2 weeks.  They last for about 10 to 15 seconds and are not associated with lightheadedness but she will have a little bit of lightheadedness at other times.  She does not have a history of rheumatic fever, heart murmur, cerebrovascular accident or TIA hypertensive, her LDL cholesterol is under 130, smokes cigarettes and she is not diabetic.    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  She grew [place] in [place] town in [place].  She is  and has 2 children ages 8 and 5.  She is .  Her 's family also has significant family history of coronary artery disease so she is quite concerned about her children.    ECG: Personally reviewed.  Sinus rhythm at 67 bpm.  The EKG is normal.       Physical Examination Review of Systems   /66 (BP Location: Left arm, Patient Position: Sitting, Cuff Size: Adult Large)   Pulse 76   Temp 98.5  F (36.9  C) (Oral)   Resp 12   Ht 1.702 m (5' 7\")   Wt 68.9 kg (152 lb)   LMP 01/15/2022 (Exact Date)   SpO2 99%   Breastfeeding No   BMI 23.81 kg/m    Body mass " "index is 23.81 kg/m .  Wt Readings from Last 3 Encounters:   02/10/22 68.9 kg (152 lb)   10/08/21 78 kg (172 lb)   08/20/21 83 kg (183 lb)     General Appearance:   Alert, cooperative and in no acute distress.   ENT/Mouth: Patient wearing a mask.      EYES:  no scleral icterus, normal conjunctivae   Neck: JVP normal. No Hepatojugular reflux. Thyroid not visualized.   Chest/Lungs:   Lungs are clear to auscultation, equal chest wall expansion.   Cardiovascular:   S1, S2 without murmur ,clicks or rubs. Brachial, radial and posterior tibial pulses are intact and symetric. No carotid bruits noted   Abdomen:  Nontender. BS+. No bruits.   Extremities: No cyanosis, clubbing or edema   Skin: no xanthelasma, warm.    Neurologic: normal arm movement bilateral, no tremors     Psychiatric: Appropriate affect.      Enc Vitals  BP: 106/66  Pulse: 76  Resp: 12  Temp: 98.5  F (36.9  C)  Temp src: Oral  SpO2: 99 %  Weight: 68.9 kg (152 lb)  Height: 170.2 cm (5' 7\")                                           Medical History  Surgical History Family History Social History   Past Medical History:   Diagnosis Date     Anxiety      Depressive disorder 2005     Dysmenorrhea      Endometriosis      Joint pain      Mental disorder     anxiety     Migraine      Psoriatic arthritis (H)      Stillbirth of single fetus     32wks, breech    Past Surgical History:   Procedure Laterality Date     ABDOMEN SURGERY  2004    Laparoscopy     BIOPSY  2004     EYE SURGERY  2014      DILATION/CURETTAGE DIAG/THER NON OB      Description: Dilation And Curettage;  Proc Date: 03/01/2010;  Comments: s/p delivery for post-partum bleeding    Family History   Problem Relation Age of Onset     Family History Negative Mother         colon polyps     Diabetes Mother         type 2     Chronic Obstructive Pulmonary Disease Father      Hypertension Father      Prostate Cancer Father      Coronary Artery Disease Father      Alzheimer Disease Maternal Grandmother      " "Melanoma Maternal Grandmother      Colon Cancer Maternal Grandfather      Melanoma Maternal Aunt      Anxiety Disorder Sister      Obesity Sister      Depression Sister      Autism Spectrum Disorder Daughter      Autism Spectrum Disorder Son      Other - See Comments Daughter         passed away during birth     Social History     Socioeconomic History     Marital status:      Spouse name: Not on file     Number of children: Not on file     Years of education: Not on file     Highest education level: Not on file   Occupational History     Not on file   Tobacco Use     Smoking status: Never Smoker     Smokeless tobacco: Never Used   Substance and Sexual Activity     Alcohol use: No     Drug use: No     Sexual activity: Yes     Partners: Male     Birth control/protection: Male Surgical, None   Other Topics Concern     Parent/sibling w/ CABG, MI or angioplasty before 65F 55M? No   Social History Narrative     Not on file     Social Determinants of Health     Financial Resource Strain: Not on file   Food Insecurity: Not on file   Transportation Needs: Not on file   Physical Activity: Not on file   Stress: Not on file   Social Connections: Not on file   Intimate Partner Violence: Not on file   Housing Stability: Not on file          Medications  Allergies   Current Outpatient Medications   Medication Sig Dispense Refill     ALPRAZolam (XANAX) 0.5 MG tablet Take 1 tablet (0.5 mg) by mouth nightly as needed for anxiety or sleep You may take 1/2 tab. 10 tablet 0     BD SAFETYGLIDE SYRINGE/NEEDLE 27G X 5/8\" 1 ML MISC TO BE USED WITH INJECTABLE METHOTREXATE       buPROPion (WELLBUTRIN XL) 300 MG 24 hr tablet Take 1 tablet (300 mg) by mouth every morning 90 tablet 1     calcium carbonate 500 mg, elemental, (OSCAL 500) 1250 (500 Ca) MG TABS tablet Take 2 tablets by mouth daily       clonazePAM (KLONOPIN) 0.5 MG ODT Take 1 tablet (0.5 mg) by mouth nightly as needed for anxiety 30 tablet 2     escitalopram (LEXAPRO) 20 MG " tablet Take 0.5 tablets (10 mg) by mouth daily 90 tablet 3     folic acid (FOLVITE) 1 MG tablet TAKE 2 TABLETS BY MOUTH EVERY MORNING       HUMIRA *CF* PEN 40 MG/0.4ML pen kit        methotrexate 50 MG/2ML injection INJECT 0.7 ML SUBCUTANEOUSLY ONCE WEEKLY       Vitamin D3 (CHOLECALCIFEROL) 125 MCG (5000 UT) tablet Take 1 tablet by mouth daily      No Known Allergies      Lab Results    Chemistry/lipid CBC Cardiac Enzymes/BNP/TSH/INR   Lab Results   Component Value Date    CHOL 178 07/09/2021    HDL 57 07/09/2021    TRIG 55 07/09/2021    BUN 5 (L) 07/09/2021     07/09/2021    CO2 25 07/09/2021    Lab Results   Component Value Date    WBC 5.6 07/09/2021    HGB 13.9 07/09/2021    HCT 41.4 07/09/2021    MCV 86 07/09/2021     07/09/2021    Lab Results   Component Value Date    TSH 0.90 07/09/2021    INR 0.99 08/21/2017                                               Thank you for allowing me to participate in the care of your patient.      Sincerely,     Mina Baumann MD     [facility]  cc:   Rob Cooper MD  [address] [place] [address]         Melolabial Interpolation Flap Text: A decision was made to reconstruct the defect utilizing an interpolation axial flap and a staged reconstruction.  A telfa template was made of the defect.  This telfa template was then used to outline the melolabial interpolation flap.  The donor area for the pedicle flap was then injected with anesthesia.  The flap was excised through the skin and subcutaneous tissue down to the layer of the underlying musculature.  The pedicle flap was carefully excised within this deep plane to maintain its blood supply.  The edges of the donor site were undermined.   The donor site was closed in a primary fashion.  The pedicle was then rotated into position and sutured.  Once the tube was sutured into place, adequate blood supply was confirmed with blanching and refill.  The pedicle was then wrapped with xeroform gauze and dressed appropriately with a telfa and gauze bandage to ensure continued blood supply and protect the attached pedicle.

## 2022-02-10 NOTE — PROGRESS NOTES
[facility]  [phone]    Thank you, Dr. Cooper, for asking the [facility] team to see Ms. Kathya Chino to evaluate dramatically positive family history of sudden death.      Assessment/Recommendations   Patient with no symptoms other than shortness of breath with activity which she feels is a little more dramatic than she would expect.  She had a brother who recently  suddenly with atherosclerotic coronary vascular disease noted on his death certificate but the full autopsy is not yet provided.  Father  suddenly at age 57 without an autopsy of presumed myocardial infarction.  Patient is quite concerned regarding her risk of family history and her lipids are reasonable, she is not diabetic, and she does not smoke cigarettes.  She is not treated for hypertension.    Given the dramatic family history, I think would be quite reasonable to perform coronary CT angiography to ensure that she does not have significant plaque or any plaque.  If she did have plaque, I would be very aggressive in treating her LDL cholesterol given the likelihood that both her brother and her father  from ruptured plaque.  She is very agreeable.    She has never had a twelve-lead ECG so we will do that today as well.    We will get back to her with the results of the CT angiogram.  If she has normal coronary arteries, I would have a low threshold for an echocardiogram to rule out hypertrophic cardiomyopathy and asked to see the autopsy for her brother to see if he had any evidence for hypertrophic cardiomyopathy.    Thank you for allowing us to participate in her care.       History of Present Illness/Subjective    Ms. Kathya Chino is a 36 year old female with no known cardiac history but dramatically positive family history.  Her father  at age 57 suddenly.  He did not have an autopsy but it was presumed myocardial infarction.  Her brother  at age 35, also suddenly.  He  "had a viral illness for a few weeks but really no other symptoms and was found at college.  Had an autopsy and the death certificate reads atherosclerotic coronary vascular disease but they have not seen the autopsy yet but have requested them.  The patient's grandfather and her father side also  52.    The patient denies any chest discomfort but has shortness of breath when she does her workouts.  She does attend and take it feels like she is a bit more short of breath than she should be.  She denies any orthopnea, paroxysmal nocturnal dyspnea, peripheral edema, syncope or near syncopal episodes but does have palpitation a couple of times every 2 weeks.  They last for about 10 to 15 seconds and are not associated with lightheadedness but she will have a little bit of lightheadedness at other times.  She does not have a history of rheumatic fever, heart murmur, cerebrovascular accident or TIA hypertensive, her LDL cholesterol is under 130, smokes cigarettes and she is not diabetic.    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  She grew up in a small town in [place].  She is  and has 2 children ages 8 and 5.  She is .  Her 's family also has significant family history of coronary artery disease so she is quite concerned about her children.    ECG: Personally reviewed.  Sinus rhythm at 67 bpm.  The EKG is normal.       Physical Examination Review of Systems   /66 (BP Location: Left arm, Patient Position: Sitting, Cuff Size: Adult Large)   Pulse 76   Temp 98.5  F (36.9  C) (Oral)   Resp 12   Ht 1.702 m (5' 7\")   Wt 68.9 kg (152 lb)   LMP 01/15/2022 (Exact Date)   SpO2 99%   Breastfeeding No   BMI 23.81 kg/m    Body mass index is 23.81 kg/m .  Wt Readings from Last 3 Encounters:   02/10/22 68.9 kg (152 lb)   10/08/21 78 kg (172 lb)   21 83 kg (183 lb)     General Appearance:   Alert, cooperative and in no acute distress. " "  ENT/Mouth: Patient wearing a mask.      EYES:  no scleral icterus, normal conjunctivae   Neck: JVP normal. No Hepatojugular reflux. Thyroid not visualized.   Chest/Lungs:   Lungs are clear to auscultation, equal chest wall expansion.   Cardiovascular:   S1, S2 without murmur ,clicks or rubs. Brachial, radial and posterior tibial pulses are intact and symetric. No carotid bruits noted   Abdomen:  Nontender. BS+. No bruits.   Extremities: No cyanosis, clubbing or edema   Skin: no xanthelasma, warm.    Neurologic: normal arm movement bilateral, no tremors     Psychiatric: Appropriate affect.      Enc Vitals  BP: 106/66  Pulse: 76  Resp: 12  Temp: 98.5  F (36.9  C)  Temp src: Oral  SpO2: 99 %  Weight: 68.9 kg (152 lb)  Height: 170.2 cm (5' 7\")                                           Medical History  Surgical History Family History Social History   Past Medical History:   Diagnosis Date     Anxiety      Depressive disorder 2005     Dysmenorrhea      Endometriosis      Joint pain      Mental disorder     anxiety     Migraine      Psoriatic arthritis (H)      Stillbirth of single fetus     32wks, breech    Past Surgical History:   Procedure Laterality Date     ABDOMEN SURGERY  2004    Laparoscopy     BIOPSY  2004     EYE SURGERY  2014      DILATION/CURETTAGE DIAG/THER NON OB      Description: Dilation And Curettage;  Proc Date: 03/01/2010;  Comments: s/p delivery for post-partum bleeding    Family History   Problem Relation Age of Onset     Family History Negative Mother         colon polyps     Diabetes Mother         type 2     Chronic Obstructive Pulmonary Disease Father      Hypertension Father      Prostate Cancer Father      Coronary Artery Disease Father      Alzheimer Disease Maternal Grandmother      Melanoma Maternal Grandmother      Colon Cancer Maternal Grandfather      Melanoma Maternal Aunt      Anxiety Disorder Sister      Obesity Sister      Depression Sister      Autism Spectrum Disorder Daughter  " "    Autism Spectrum Disorder Son      Other - See Comments Daughter         passed away during birth     Social History     Socioeconomic History     Marital status:      Spouse name: Not on file     Number of children: Not on file     Years of education: Not on file     Highest education level: Not on file   Occupational History     Not on file   Tobacco Use     Smoking status: Never Smoker     Smokeless tobacco: Never Used   Substance and Sexual Activity     Alcohol use: No     Drug use: No     Sexual activity: Yes     Partners: Male     Birth control/protection: Male Surgical, None   Other Topics Concern     Parent/sibling w/ CABG, MI or angioplasty before 65F 55M? No   Social History Narrative     Not on file     Social Determinants of Health     Financial Resource Strain: Not on file   Food Insecurity: Not on file   Transportation Needs: Not on file   Physical Activity: Not on file   Stress: Not on file   Social Connections: Not on file   Intimate Partner Violence: Not on file   Housing Stability: Not on file          Medications  Allergies   Current Outpatient Medications   Medication Sig Dispense Refill     ALPRAZolam (XANAX) 0.5 MG tablet Take 1 tablet (0.5 mg) by mouth nightly as needed for anxiety or sleep You may take 1/2 tab. 10 tablet 0     BD SAFETYGLIDE SYRINGE/NEEDLE 27G X 5/8\" 1 ML MISC TO BE USED WITH INJECTABLE METHOTREXATE       buPROPion (WELLBUTRIN XL) 300 MG 24 hr tablet Take 1 tablet (300 mg) by mouth every morning 90 tablet 1     calcium carbonate 500 mg, elemental, (OSCAL 500) 1250 (500 Ca) MG TABS tablet Take 2 tablets by mouth daily       clonazePAM (KLONOPIN) 0.5 MG ODT Take 1 tablet (0.5 mg) by mouth nightly as needed for anxiety 30 tablet 2     escitalopram (LEXAPRO) 20 MG tablet Take 0.5 tablets (10 mg) by mouth daily 90 tablet 3     folic acid (FOLVITE) 1 MG tablet TAKE 2 TABLETS BY MOUTH EVERY MORNING       HUMIRA *CF* PEN 40 MG/0.4ML pen kit        methotrexate 50 MG/2ML " injection INJECT 0.7 ML SUBCUTANEOUSLY ONCE WEEKLY       Vitamin D3 (CHOLECALCIFEROL) 125 MCG (5000 UT) tablet Take 1 tablet by mouth daily      No Known Allergies      Lab Results    Chemistry/lipid CBC Cardiac Enzymes/BNP/TSH/INR   Lab Results   Component Value Date    CHOL 178 07/09/2021    HDL 57 07/09/2021    TRIG 55 07/09/2021    BUN 5 (L) 07/09/2021     07/09/2021    CO2 25 07/09/2021    Lab Results   Component Value Date    WBC 5.6 07/09/2021    HGB 13.9 07/09/2021    HCT 41.4 07/09/2021    MCV 86 07/09/2021     07/09/2021    Lab Results   Component Value Date    TSH 0.90 07/09/2021    INR 0.99 08/21/2017

## 2022-02-25 RX ORDER — DILTIAZEM HYDROCHLORIDE 5 MG/ML
5-25 INJECTION INTRAVENOUS
Status: CANCELLED | OUTPATIENT
Start: 2022-02-25

## 2022-02-25 RX ORDER — LIDOCAINE 40 MG/G
CREAM TOPICAL
Status: CANCELLED | OUTPATIENT
Start: 2022-02-25

## 2022-02-25 RX ORDER — DILTIAZEM HYDROCHLORIDE 5 MG/ML
10 INJECTION INTRAVENOUS
Status: CANCELLED | OUTPATIENT
Start: 2022-02-25

## 2022-03-09 ENCOUNTER — HOSPITAL ENCOUNTER (OUTPATIENT)
Dept: CT IMAGING | Facility: CLINIC | Age: 37
Discharge: HOME | End: 2022-03-09
Attending: INTERNAL MEDICINE | Admitting: INTERNAL MEDICINE
Payer: COMMERCIAL

## 2022-03-09 VITALS
DIASTOLIC BLOOD PRESSURE: 59 MMHG | HEART RATE: 85 BPM | WEIGHT: 152 LBS | BODY MASS INDEX: 23.86 KG/M2 | HEIGHT: 67 IN | SYSTOLIC BLOOD PRESSURE: 96 MMHG

## 2022-03-09 DIAGNOSIS — Z82.41 FAMILY HISTORY OF SUDDEN CARDIAC DEATH IN FATHER: ICD-10-CM

## 2022-03-09 DIAGNOSIS — Z82.41 FAMILY HISTORY OF SUDDEN CARDIAC DEATH IN BROTHER: Primary | ICD-10-CM

## 2022-03-09 DIAGNOSIS — Z82.41 FAMILY HISTORY OF SUDDEN CARDIAC DEATH IN BROTHER: ICD-10-CM

## 2022-03-09 LAB
BSA FOR ECHO PROCEDURE: 1.8 M2
CCTA ASCENDING AORTA: 2.8
CCTA SINUS: 3.4
CREAT BLD-MCNC: 0.8 MG/DL (ref 0.6–1.1)
CV CALCIUM SCORE AGATSTON LM: 0
CV CALCIUM SCORING AGATSON LAD: 0
CV CALCIUM SCORING AGATSTON CX: 0
CV CALCIUM SCORING AGATSTON RCA: 0
CV CALCIUM SCORING AGATSTON TOTAL: 0
GFR SERPL CREATININE-BSD FRML MDRD: >60 ML/MIN/1.73M2

## 2022-03-09 PROCEDURE — 250N000013 HC RX MED GY IP 250 OP 250 PS 637: Performed by: INTERNAL MEDICINE

## 2022-03-09 PROCEDURE — 82565 ASSAY OF CREATININE: CPT

## 2022-03-09 PROCEDURE — 75574 CT ANGIO HRT W/3D IMAGE: CPT

## 2022-03-09 PROCEDURE — 250N000009 HC RX 250: Performed by: INTERNAL MEDICINE

## 2022-03-09 PROCEDURE — 75574 CT ANGIO HRT W/3D IMAGE: CPT | Mod: 26 | Performed by: GENERAL ACUTE CARE HOSPITAL

## 2022-03-09 RX ORDER — METOPROLOL TARTRATE 1 MG/ML
5-20 INJECTION, SOLUTION INTRAVENOUS
Status: DISCONTINUED | OUTPATIENT
Start: 2022-03-09 | End: 2022-03-10 | Stop reason: HOSPADM

## 2022-03-09 RX ORDER — NITROGLYCERIN 0.4 MG/1
0.4 TABLET SUBLINGUAL ONCE
Status: COMPLETED | OUTPATIENT
Start: 2022-03-09 | End: 2022-03-09

## 2022-03-09 RX ADMIN — METOPROLOL TARTRATE 5 MG: 5 INJECTION INTRAVENOUS at 08:11

## 2022-03-09 RX ADMIN — NITROGLYCERIN 0.4 MG: 0.4 TABLET SUBLINGUAL at 07:59

## 2022-03-29 ENCOUNTER — OFFICE VISIT (OUTPATIENT)
Dept: FAMILY MEDICINE | Facility: CLINIC | Age: 37
End: 2022-03-29
Payer: COMMERCIAL

## 2022-03-29 VITALS
WEIGHT: 156.4 LBS | HEIGHT: 67 IN | SYSTOLIC BLOOD PRESSURE: 96 MMHG | DIASTOLIC BLOOD PRESSURE: 60 MMHG | OXYGEN SATURATION: 99 % | HEART RATE: 80 BPM | BODY MASS INDEX: 24.55 KG/M2

## 2022-03-29 DIAGNOSIS — E66.3 OVERWEIGHT (BMI 25.0-29.9): Primary | ICD-10-CM

## 2022-03-29 DIAGNOSIS — F33.0 MAJOR DEPRESSIVE DISORDER, RECURRENT EPISODE, MILD (H): ICD-10-CM

## 2022-03-29 DIAGNOSIS — L65.0 TELOGEN EFFLUVIUM: ICD-10-CM

## 2022-03-29 PROCEDURE — 99214 OFFICE O/P EST MOD 30 MIN: CPT | Performed by: FAMILY MEDICINE

## 2022-03-29 RX ORDER — BUPROPION HYDROCHLORIDE 150 MG/1
150 TABLET ORAL EVERY MORNING
Qty: 30 TABLET | Refills: 2 | Status: SHIPPED | OUTPATIENT
Start: 2022-03-29 | End: 2022-07-06

## 2022-03-29 RX ORDER — CLONAZEPAM 0.5 MG/1
0.5 TABLET, ORALLY DISINTEGRATING ORAL
Qty: 30 TABLET | Refills: 2 | Status: SHIPPED | OUTPATIENT
Start: 2022-03-29 | End: 2023-02-17

## 2022-03-29 RX ORDER — DIETHYLPROPION HYDROCHLORIDE 75 MG/1
75 TABLET, EXTENDED RELEASE ORAL DAILY
Qty: 30 TABLET | Refills: 0 | Status: SHIPPED | OUTPATIENT
Start: 2022-03-29 | End: 2022-05-02

## 2022-03-29 ASSESSMENT — ENCOUNTER SYMPTOMS
PSYCHIATRIC NEGATIVE: 1
RESPIRATORY NEGATIVE: 1
GASTROINTESTINAL NEGATIVE: 1
MUSCULOSKELETAL NEGATIVE: 1
CARDIOVASCULAR NEGATIVE: 1
CONSTITUTIONAL NEGATIVE: 1
NEUROLOGICAL NEGATIVE: 1

## 2022-03-29 ASSESSMENT — PATIENT HEALTH QUESTIONNAIRE - PHQ9
SUM OF ALL RESPONSES TO PHQ QUESTIONS 1-9: 11
10. IF YOU CHECKED OFF ANY PROBLEMS, HOW DIFFICULT HAVE THESE PROBLEMS MADE IT FOR YOU TO DO YOUR WORK, TAKE CARE OF THINGS AT HOME, OR GET ALONG WITH OTHER PEOPLE: VERY DIFFICULT
SUM OF ALL RESPONSES TO PHQ QUESTIONS 1-9: 11

## 2022-03-29 ASSESSMENT — ANXIETY QUESTIONNAIRES
7. FEELING AFRAID AS IF SOMETHING AWFUL MIGHT HAPPEN: MORE THAN HALF THE DAYS
1. FEELING NERVOUS, ANXIOUS, OR ON EDGE: NEARLY EVERY DAY
2. NOT BEING ABLE TO STOP OR CONTROL WORRYING: NEARLY EVERY DAY
GAD7 TOTAL SCORE: 17
6. BECOMING EASILY ANNOYED OR IRRITABLE: MORE THAN HALF THE DAYS
4. TROUBLE RELAXING: NEARLY EVERY DAY
GAD7 TOTAL SCORE: 17
GAD7 TOTAL SCORE: 17
7. FEELING AFRAID AS IF SOMETHING AWFUL MIGHT HAPPEN: MORE THAN HALF THE DAYS
5. BEING SO RESTLESS THAT IT IS HARD TO SIT STILL: SEVERAL DAYS
3. WORRYING TOO MUCH ABOUT DIFFERENT THINGS: NEARLY EVERY DAY

## 2022-03-29 NOTE — PROGRESS NOTES
Problem List Items Addressed This Visit     Major depressive disorder, recurrent episode, mild (H)     She found that 300 mg of Wellbutrin was actually quite helpful.  However, she also started to lose her hair a couple months ago and she is concerned that Wellbutrin was causing hair loss.  I am inclined to think her hair loss is actually from rapid weight loss in 2021.  -Continue Wellbutrin at 150 mg daily.  -Continue Lexapro.  Utility?  -She will start psychotherapy.  -Infrequent use of clonazepam.  Refill sent (most recent refill was 10 months ago).         Relevant Medications    buPROPion (WELLBUTRIN XL) 150 MG 24 hr tablet    clonazePAM (KLONOPIN) 0.5 MG ODT    Overweight (BMI 25.0-29.9) - Primary     Small amount of weight gain since she discontinued phentermine.  Her side effects of jitteriness (while also on Wellbutrin) have subsided.  She is concerned that she has more drive to eat than she did previously.  Telogen effluvium symptoms as described elsewhere.  -Trial of diethylpropion.         Relevant Medications    Diethylpropion HCl CR 75 MG TB24    Telogen effluvium     New concern.  Context includes stress/grieving following her brother's death.  Rapid weight loss in 2021.  Multiple medications which could potentially have this as a side effect.  Given that we are struggling to identify which of the different factors might be contributory, referral placed to dermatology with clinical question of confirmation of diagnosis and evaluation of whether or not she might be starting to regrow hair follicles.         Relevant Orders    Adult Dermatology Referral        Return in about 3 months (around 6/29/2022) for Weight loss follow-up visit, Depression focused visit.    Leslee Rasheed is a 36 year old who presents for the following health issues     Weight:   - up 4-5 lbs.   - sleep is disrupted.  Stress remains high.   - energy has been okay.     - nutrition: continues to try to stick to healthy  "choices.  Some overeating.    Mental health / hair loss:  -  Hair loss: \"handfuls.\"  On 300 mg of wellbutrin.  She has reduced because of hair loss.  \"What has changed.\" no side effects while on wellbutrin without phentermine. Continues lexapro.  Stress remains high.  She first noticed hair loss about 2 months ago.  Loss has been constant.    - Rheumatologist belives that folic acid should help with hair loss from methotrexate.    - she started psychotherapy.    - rare use of clonazepam. No refills since last year     History of Present Illness       Mental Health Follow-up:  Patient presents to follow-up on Depression & Anxiety.Patient's depression since last visit has been:  Medium  The patient is not having other symptoms associated with depression.  Patient's anxiety since last visit has been:  Medium  The patient is not having other symptoms associated with anxiety.  Any significant life events: grief or loss and other  Patient is feeling anxious or having panic attacks.  Patient has no concerns about alcohol or drug use.       Today's PHQ-9         PHQ-9 Total Score: 11  PHQ-9 Q9 Thoughts of better off dead/self-harm past 2 weeks :   Not at all    How difficult have these problems made it for you to do your work, take care of things at home, or get along with other people: Very difficult    Today's ZACK-7 Score: 17    She eats 2-3 servings of fruits and vegetables daily.She consumes 0 sweetened beverage(s) daily.She exercises with enough effort to increase her heart rate 9 or less minutes per day.  She exercises with enough effort to increase her heart rate 4 days per week.   She is taking medications regularly.       Review of Systems   Constitutional: Negative.    Respiratory: Negative.    Cardiovascular: Negative.    Gastrointestinal: Negative.    Genitourinary: Negative.    Musculoskeletal: Negative.    Neurological: Negative.    Psychiatric/Behavioral: Negative.          Objective    BP 96/60 (BP Location: " "Left arm, Patient Position: Sitting, Cuff Size: Adult Regular)   Pulse 80   Ht 1.702 m (5' 7\")   Wt 70.9 kg (156 lb 6.4 oz)   SpO2 99%   BMI 24.50 kg/m    Body mass index is 24.5 kg/m .  Physical Exam  Nursing note reviewed.   Constitutional:       General: She is not in acute distress.     Appearance: Normal appearance. She is not ill-appearing.   HENT:      Head: Normocephalic and atraumatic.   Eyes:      Extraocular Movements: Extraocular movements intact.      Conjunctiva/sclera: Conjunctivae normal.   Pulmonary:      Effort: Pulmonary effort is normal.   Neurological:      Mental Status: She is alert and oriented to person, place, and time.   Psychiatric:         Attention and Perception: Attention normal.         Mood and Affect: Mood normal.         Speech: Speech normal.         Thought Content: Thought content normal.                 "

## 2022-03-29 NOTE — ASSESSMENT & PLAN NOTE
New concern.  Context includes stress/grieving following her brother's death.  Rapid weight loss in 2021.  Multiple medications which could potentially have this as a side effect.  Given that we are struggling to identify which of the different factors might be contributory, referral placed to dermatology with clinical question of confirmation of diagnosis and evaluation of whether or not she might be starting to regrow hair follicles.

## 2022-03-29 NOTE — ASSESSMENT & PLAN NOTE
She found that 300 mg of Wellbutrin was actually quite helpful.  However, she also started to lose her hair a couple months ago and she is concerned that Wellbutrin was causing hair loss.  I am inclined to think her hair loss is actually from rapid weight loss in 2021.  -Continue Wellbutrin at 150 mg daily.  -Continue Lexapro.  Utility?  -She will start psychotherapy.  -Infrequent use of clonazepam.  Refill sent (most recent refill was 10 months ago).

## 2022-03-29 NOTE — ASSESSMENT & PLAN NOTE
Small amount of weight gain since she discontinued phentermine.  Her side effects of jitteriness (while also on Wellbutrin) have subsided.  She is concerned that she has more drive to eat than she did previously.  Telogen effluvium symptoms as described elsewhere.  -Trial of diethylpropion.

## 2022-03-30 ASSESSMENT — ANXIETY QUESTIONNAIRES: GAD7 TOTAL SCORE: 17

## 2022-04-21 ENCOUNTER — TRANSFERRED RECORDS (OUTPATIENT)
Dept: HEALTH INFORMATION MANAGEMENT | Facility: CLINIC | Age: 37
End: 2022-04-21
Payer: COMMERCIAL

## 2022-05-23 ENCOUNTER — TRANSFERRED RECORDS (OUTPATIENT)
Dept: HEALTH INFORMATION MANAGEMENT | Facility: CLINIC | Age: 37
End: 2022-05-23
Payer: COMMERCIAL

## 2022-05-23 LAB
ALT SERPL-CCNC: 14 IU/L (ref 5–35)
AST SERPL-CCNC: 14 U/L (ref 5–34)
CREATININE (EXTERNAL): 0.73 MG/DL (ref 0.5–1.3)

## 2022-06-01 ENCOUNTER — MYC MEDICAL ADVICE (OUTPATIENT)
Dept: FAMILY MEDICINE | Facility: CLINIC | Age: 37
End: 2022-06-01
Payer: COMMERCIAL

## 2022-06-01 DIAGNOSIS — E66.3 OVERWEIGHT (BMI 25.0-29.9): Primary | ICD-10-CM

## 2022-06-02 RX ORDER — PHENTERMINE HYDROCHLORIDE 15 MG/1
15-30 CAPSULE ORAL EVERY MORNING
Qty: 60 CAPSULE | Refills: 0 | Status: SHIPPED | OUTPATIENT
Start: 2022-06-02 | End: 2022-09-28

## 2022-06-07 DIAGNOSIS — F41.9 ANXIETY: ICD-10-CM

## 2022-06-07 DIAGNOSIS — F32.0 MILD MAJOR DEPRESSION (H): ICD-10-CM

## 2022-06-07 NOTE — TELEPHONE ENCOUNTER
Received e-scribe error message as below:        Call placed to pharmacy to verify if patient had received this medication recently. Pharmacy states that pt picked this medication up 6/4.   Lakhwinder Donaldson RN  [facility]

## 2022-06-09 RX ORDER — ESCITALOPRAM OXALATE 20 MG/1
TABLET ORAL
Qty: 90 TABLET | Refills: 3 | OUTPATIENT
Start: 2022-06-09

## 2022-06-09 NOTE — TELEPHONE ENCOUNTER
Routing to patient's PCP refill pool.    CHRISTIAN Pinzon, MALIAN, RN  [facility]

## 2022-06-10 ENCOUNTER — MYC MEDICAL ADVICE (OUTPATIENT)
Dept: FAMILY MEDICINE | Facility: CLINIC | Age: 37
End: 2022-06-10
Payer: COMMERCIAL

## 2022-06-10 DIAGNOSIS — F41.9 ANXIETY: ICD-10-CM

## 2022-06-10 DIAGNOSIS — F32.0 MILD MAJOR DEPRESSION (H): ICD-10-CM

## 2022-06-10 NOTE — TELEPHONE ENCOUNTER
[employer] message sent for clarifcation.     Lakhwinder Donaldson RN  [facility]

## 2022-06-10 NOTE — TELEPHONE ENCOUNTER
Pt informed refills should be at pharmacy. Encouraged to call. Closing encounter.     Lakhwinder Donaldson RN  [facility]

## 2022-06-14 RX ORDER — ESCITALOPRAM OXALATE 20 MG/1
TABLET ORAL
Qty: 30 TABLET | OUTPATIENT
Start: 2022-06-14

## 2022-06-14 NOTE — TELEPHONE ENCOUNTER
Message sent to pharmacy - Refusal reason: Patient no longer under provider care (PLEASE ROUTE REQUEST TO Rob Cooper MD AT [facility].  Marlon GUAMAN

## 2022-08-18 DIAGNOSIS — E66.3 OVERWEIGHT (BMI 25.0-29.9): ICD-10-CM

## 2022-08-18 RX ORDER — TOPIRAMATE SPINKLE 25 MG/1
CAPSULE ORAL
Qty: 90 CAPSULE | Refills: 1 | OUTPATIENT
Start: 2022-08-18

## 2022-08-18 NOTE — TELEPHONE ENCOUNTER
Overweight (BMI 25.0-29.9)  Doing well with weight loss.  Not on topiramate.  More cravings for sugar (more emotional).  She describes side effects while topiramate including shortness of breath.  They then resolved.  It did reduce her cravings for sugar.  I consider this more of an intolerance than an actual side effect.  We could consider restart of topiramate in the future with copious amounts of water.  I wonder if there is some type of blood electrolyte abnormality as topiramate can shift chloride levels.   - continue phentermine at 30 mg.  Will consider decreasing to 15 mg in 1-2 months.    1/21/22

## 2022-09-11 ENCOUNTER — HEALTH MAINTENANCE LETTER (OUTPATIENT)
Age: 37
End: 2022-09-11

## 2022-09-28 ENCOUNTER — OFFICE VISIT (OUTPATIENT)
Dept: FAMILY MEDICINE | Facility: CLINIC | Age: 37
End: 2022-09-28
Payer: COMMERCIAL

## 2022-09-28 VITALS
BODY MASS INDEX: 28.8 KG/M2 | TEMPERATURE: 98.3 F | HEIGHT: 67 IN | HEART RATE: 85 BPM | OXYGEN SATURATION: 99 % | RESPIRATION RATE: 15 BRPM | DIASTOLIC BLOOD PRESSURE: 62 MMHG | SYSTOLIC BLOOD PRESSURE: 100 MMHG | WEIGHT: 183.5 LBS

## 2022-09-28 DIAGNOSIS — F50.819 BINGE EATING DISORDER: ICD-10-CM

## 2022-09-28 DIAGNOSIS — Z13.0 SCREENING FOR DEFICIENCY ANEMIA: ICD-10-CM

## 2022-09-28 DIAGNOSIS — R63.5 WEIGHT GAIN: ICD-10-CM

## 2022-09-28 DIAGNOSIS — N80.9 ENDOMETRIOSIS: ICD-10-CM

## 2022-09-28 DIAGNOSIS — E66.3 OVERWEIGHT (BMI 25.0-29.9): ICD-10-CM

## 2022-09-28 DIAGNOSIS — L40.50 PSORIATIC ARTHRITIS (H): ICD-10-CM

## 2022-09-28 DIAGNOSIS — L65.0 TELOGEN EFFLUVIUM: ICD-10-CM

## 2022-09-28 DIAGNOSIS — E88.819 INSULIN RESISTANCE: ICD-10-CM

## 2022-09-28 DIAGNOSIS — F33.0 MAJOR DEPRESSIVE DISORDER, RECURRENT EPISODE, MILD (H): ICD-10-CM

## 2022-09-28 DIAGNOSIS — Z13.220 SCREENING FOR LIPID DISORDERS: ICD-10-CM

## 2022-09-28 DIAGNOSIS — Z00.00 ENCOUNTER FOR ROUTINE ADULT HEALTH EXAMINATION WITHOUT ABNORMAL FINDINGS: Primary | ICD-10-CM

## 2022-09-28 DIAGNOSIS — Z13.1 SCREENING FOR DIABETES MELLITUS: ICD-10-CM

## 2022-09-28 LAB
ERYTHROCYTE [DISTWIDTH] IN BLOOD BY AUTOMATED COUNT: 13.8 % (ref 10–15)
HCT VFR BLD AUTO: 37.8 % (ref 35–47)
HGB BLD-MCNC: 11.9 G/DL (ref 11.7–15.7)
MCH RBC QN AUTO: 27.4 PG (ref 26.5–33)
MCHC RBC AUTO-ENTMCNC: 31.5 G/DL (ref 31.5–36.5)
MCV RBC AUTO: 87 FL (ref 78–100)
PLATELET # BLD AUTO: 301 10E3/UL (ref 150–450)
RBC # BLD AUTO: 4.34 10E6/UL (ref 3.8–5.2)
WBC # BLD AUTO: 5.7 10E3/UL (ref 4–11)

## 2022-09-28 PROCEDURE — 91312 COVID-19,PF,PFIZER BOOSTER BIVALENT: CPT | Performed by: FAMILY MEDICINE

## 2022-09-28 PROCEDURE — 80061 LIPID PANEL: CPT | Performed by: FAMILY MEDICINE

## 2022-09-28 PROCEDURE — 90471 IMMUNIZATION ADMIN: CPT | Performed by: FAMILY MEDICINE

## 2022-09-28 PROCEDURE — 36415 COLL VENOUS BLD VENIPUNCTURE: CPT | Performed by: FAMILY MEDICINE

## 2022-09-28 PROCEDURE — 99214 OFFICE O/P EST MOD 30 MIN: CPT | Mod: 25 | Performed by: FAMILY MEDICINE

## 2022-09-28 PROCEDURE — 99395 PREV VISIT EST AGE 18-39: CPT | Mod: 25 | Performed by: FAMILY MEDICINE

## 2022-09-28 PROCEDURE — 83525 ASSAY OF INSULIN: CPT | Performed by: FAMILY MEDICINE

## 2022-09-28 PROCEDURE — 0124A COVID-19,PF,PFIZER BOOSTER BIVALENT: CPT | Performed by: FAMILY MEDICINE

## 2022-09-28 PROCEDURE — 80053 COMPREHEN METABOLIC PANEL: CPT | Performed by: FAMILY MEDICINE

## 2022-09-28 PROCEDURE — 85027 COMPLETE CBC AUTOMATED: CPT | Performed by: FAMILY MEDICINE

## 2022-09-28 PROCEDURE — 90686 IIV4 VACC NO PRSV 0.5 ML IM: CPT | Performed by: FAMILY MEDICINE

## 2022-09-28 PROCEDURE — 84443 ASSAY THYROID STIM HORMONE: CPT | Performed by: FAMILY MEDICINE

## 2022-09-28 RX ORDER — PREDNISONE 5 MG/1
TABLET ORAL PRN
COMMUNITY
Start: 2022-05-23

## 2022-09-28 RX ORDER — BUPROPION HYDROCHLORIDE 300 MG/1
300 TABLET ORAL EVERY MORNING
Qty: 90 TABLET | Refills: 1 | Status: SHIPPED | OUTPATIENT
Start: 2022-09-28 | End: 2023-02-17

## 2022-09-28 RX ORDER — BIMATOPROST 3 UG/ML
SOLUTION TOPICAL
COMMUNITY
Start: 2022-04-26 | End: 2023-02-17

## 2022-09-28 RX ORDER — TOPIRAMATE SPINKLE 25 MG/1
CAPSULE ORAL
COMMUNITY
Start: 2022-07-22 | End: 2022-09-28

## 2022-09-28 RX ORDER — PHENTERMINE HYDROCHLORIDE 30 MG/1
CAPSULE ORAL
COMMUNITY
Start: 2022-06-03 | End: 2022-09-28

## 2022-09-28 ASSESSMENT — ENCOUNTER SYMPTOMS
HEMATOCHEZIA: 0
DIZZINESS: 0
HEADACHES: 0
JOINT SWELLING: 1
PALPITATIONS: 0
CONSTIPATION: 0
EYE PAIN: 0
HEMATURIA: 0
DIARRHEA: 0
JOINT SWELLING: 0
PARESTHESIAS: 0
ARTHRALGIAS: 1
SORE THROAT: 0
MYALGIAS: 0
HEARTBURN: 0
NERVOUS/ANXIOUS: 1
FEVER: 0
WEAKNESS: 0
NAUSEA: 0
DYSURIA: 0
ABDOMINAL PAIN: 0
CHILLS: 0
BREAST MASS: 0
ABDOMINAL PAIN: 1
CONSTIPATION: 1
SHORTNESS OF BREATH: 0
HEADACHES: 1
COUGH: 0
NERVOUS/ANXIOUS: 0
ARTHRALGIAS: 0
FREQUENCY: 0

## 2022-09-28 ASSESSMENT — PATIENT HEALTH QUESTIONNAIRE - PHQ9
10. IF YOU CHECKED OFF ANY PROBLEMS, HOW DIFFICULT HAVE THESE PROBLEMS MADE IT FOR YOU TO DO YOUR WORK, TAKE CARE OF THINGS AT HOME, OR GET ALONG WITH OTHER PEOPLE: VERY DIFFICULT
SUM OF ALL RESPONSES TO PHQ QUESTIONS 1-9: 12
SUM OF ALL RESPONSES TO PHQ QUESTIONS 1-9: 12

## 2022-09-28 NOTE — ASSESSMENT & PLAN NOTE
36 year old year old female in clinic today to discuss treatment of the following conditions through diet and lifestyle modification and weight loss:  1. Encounter for routine adult health examination without abnormal findings    2. Overweight (BMI 25.0-29.9)    3. Major depressive disorder, recurrent episode, mild (H)    4. Insulin resistance    5. Endometriosis    6. Telogen effluvium    7. Screening for lipid disorders    8. Screening for diabetes mellitus    9. Weight gain    10. Screening for deficiency anemia      The patient's weight loss result since the last visit was unsuccessful based on weight regain.  She has been struggling with mood and symptoms suggesting binge eating disorder.  She is working with a therapist.  Mental health plan as discussed elsewhere.  Phentermine is potentially helpful but has the potential for worsening mood.  Death appropriate and was ineffective.  She has a BMI of greater than 27 with comorbidity (insulin resistance).  I suggested a GLP-1 receptor agonist.  Saxenda prescribed.  Metabolic labs will be drawn today including an insulin level to calculate a Claudine-IR ratio.    - She is planning to continue therapy and is looking for a therapist with some skill set to apply to binge eating disorder.  Consider referral to eating disorder clinic.  Vyvanse?
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
## 2022-09-28 NOTE — ASSESSMENT & PLAN NOTE
In therapy.  She found wellbutrin helpful.  She is interested in going back up on wellbutrin.  BED?     - resume 300 mg wellbutrin   - continue lexapro for now.  Consider decrease after titratio of wellbutrin.   - continue therapy.

## 2022-09-29 LAB
ALBUMIN SERPL BCG-MCNC: 4.4 G/DL (ref 3.5–5.2)
ALP SERPL-CCNC: 71 U/L (ref 35–104)
ALT SERPL W P-5'-P-CCNC: 33 U/L (ref 10–35)
ANION GAP SERPL CALCULATED.3IONS-SCNC: 10 MMOL/L (ref 7–15)
AST SERPL W P-5'-P-CCNC: 30 U/L (ref 10–35)
BILIRUB SERPL-MCNC: 0.2 MG/DL
BUN SERPL-MCNC: 6.2 MG/DL (ref 6–20)
CALCIUM SERPL-MCNC: 9.5 MG/DL (ref 8.6–10)
CHLORIDE SERPL-SCNC: 103 MMOL/L (ref 98–107)
CHOLEST SERPL-MCNC: 156 MG/DL
CREAT SERPL-MCNC: 0.82 MG/DL (ref 0.51–0.95)
DEPRECATED HCO3 PLAS-SCNC: 27 MMOL/L (ref 22–29)
GFR SERPL CREATININE-BSD FRML MDRD: >90 ML/MIN/1.73M2
GLUCOSE SERPL-MCNC: 80 MG/DL (ref 70–99)
HDLC SERPL-MCNC: 57 MG/DL
INSULIN SERPL-ACNC: 4.5 UU/ML (ref 2.6–24.9)
LDLC SERPL CALC-MCNC: 89 MG/DL
NONHDLC SERPL-MCNC: 99 MG/DL
POTASSIUM SERPL-SCNC: 4.8 MMOL/L (ref 3.4–5.3)
PROT SERPL-MCNC: 6.8 G/DL (ref 6.4–8.3)
SODIUM SERPL-SCNC: 140 MMOL/L (ref 136–145)
TRIGL SERPL-MCNC: 50 MG/DL
TSH SERPL DL<=0.005 MIU/L-ACNC: 1.25 UIU/ML (ref 0.3–4.2)

## 2022-10-06 ENCOUNTER — TRANSFERRED RECORDS (OUTPATIENT)
Dept: FAMILY MEDICINE | Facility: CLINIC | Age: 37
End: 2022-10-06

## 2022-10-06 LAB
ALT SERPL-CCNC: 25 IU/L (ref 5–35)
AST SERPL-CCNC: 23 U/L (ref 5–34)
CREATININE (EXTERNAL): 0.71 MG/DL (ref 0.5–1.3)
GFR ESTIMATED (EXTERNAL): 100.2 ML/MIN/1.73M2

## 2022-11-07 ENCOUNTER — TELEPHONE (OUTPATIENT)
Dept: FAMILY MEDICINE | Facility: CLINIC | Age: 37
End: 2022-11-07

## 2022-11-07 NOTE — CONFIDENTIAL NOTE
Is the patient open to different pharmacies?  An alternative might be Victoza (liraglutide at a lower dose).  All of these medications have had shortages.  However, I have not personally seen a request like this for Saxenda specifically.  Perhaps a different pharmacy?

## 2022-11-07 NOTE — TELEPHONE ENCOUNTER
Drug Change Request    Contacts       Type Contact Phone/Fax    11/07/2022 08:42 AM CST Phone (Incoming) [id number] - [place], [place] - [address] [place] [facility] (Pharmacy) [phone]    11/07/2022 08:43 AM CST Phone (Outgoing) Kathya Chino (Self) [phone] (H)        What is the current medication?:  liraglutide - Weight Management (SAXENDA) 18 MG/3ML pen    What alternative is being requested?: Open to suggestions of what Dr. Cooper finds appropriate.    Why the request to change?:  Pharmacy notified us that this drug is on back order until further notice.   Patient is aware of this issue and said she is open to a drug change since she hasn't been able to find a pharmacy with it in stock.    Preferred Pharmacy:   [id number] - [place], [place] - [address] [place] [facility] [place] [address] [facility]  [address]  [place] [facility] [address]  Phone: [phone] Fax: [phone]      Could we send this information to you in [facility] or would you prefer to receive a phone call?:   Patient would prefer a phone call     Okay to leave a detailed message?: Yes at Cell number on file:    Telephone Information:   Mobile [phone]

## 2022-11-07 NOTE — TELEPHONE ENCOUNTER
Spoke with patient and relayed provider's message. She verbalized understanding and will look into other pharmacies for stock of the saxenda and get back to the clinic with an update.

## 2022-12-19 DIAGNOSIS — F32.0 MILD MAJOR DEPRESSION (H): ICD-10-CM

## 2022-12-19 DIAGNOSIS — F41.9 ANXIETY: ICD-10-CM

## 2022-12-19 RX ORDER — ESCITALOPRAM OXALATE 20 MG/1
TABLET ORAL
Qty: 90 TABLET | Refills: 3 | Status: SHIPPED | OUTPATIENT
Start: 2022-12-19 | End: 2023-03-28

## 2022-12-19 NOTE — TELEPHONE ENCOUNTER
"Routing refill request to provider for review/approval because:  PHQ-9 score of 12 at last visit.     Last Written Prescription Date:  11/24/2021  Last Fill Quantity: 90,  # refills: 3   Last office visit provider:  9/28/2022     Requested Prescriptions   Pending Prescriptions Disp Refills     escitalopram (LEXAPRO) 20 MG tablet [Pharmacy Med Name: ESCITALOPRAM 20MG TABLETS] 90 tablet 3     Sig: TAKE 1 TABLET BY MOUTH EVERY DAY       SSRIs Protocol Failed - 12/19/2022  3:28 PM        Failed - PHQ-9 score less than 5 in past 6 months     Please review last PHQ-9 score.           Passed - Medication is active on med list        Passed - Patient is age 18 or older        Passed - No active pregnancy on record        Passed - No positive pregnancy test in last 12 months        Passed - Recent (6 mo) or future (30 days) visit within the authorizing provider's specialty     Patient had office visit in the last 6 months or has a visit in the next 30 days with authorizing provider or within the authorizing provider's specialty.  See \"Patient Info\" tab in inbasket, or \"Choose Columns\" in Meds & Orders section of the refill encounter.                 Josey Bermeo RN 12/19/22 3:28 PM  "

## 2023-01-05 ENCOUNTER — TRANSFERRED RECORDS (OUTPATIENT)
Dept: HEALTH INFORMATION MANAGEMENT | Facility: CLINIC | Age: 38
End: 2023-01-05

## 2023-01-05 LAB
ALT SERPL-CCNC: 11 IU/L (ref 5–35)
AST SERPL-CCNC: 17 U/L (ref 5–34)
CREATININE (EXTERNAL): 0.93 MG/DL (ref 0.5–1.3)

## 2023-01-16 DIAGNOSIS — F33.0 MAJOR DEPRESSIVE DISORDER, RECURRENT EPISODE, MILD (H): ICD-10-CM

## 2023-01-17 RX ORDER — CLONAZEPAM 0.5 MG/1
TABLET, ORALLY DISINTEGRATING ORAL
Qty: 30 TABLET | OUTPATIENT
Start: 2023-01-17

## 2023-01-25 ENCOUNTER — E-VISIT (OUTPATIENT)
Dept: FAMILY MEDICINE | Facility: CLINIC | Age: 38
End: 2023-01-25
Payer: COMMERCIAL

## 2023-01-25 DIAGNOSIS — N39.0 ACUTE UTI (URINARY TRACT INFECTION): Primary | ICD-10-CM

## 2023-01-25 PROCEDURE — 99421 OL DIG E/M SVC 5-10 MIN: CPT | Performed by: PHYSICIAN ASSISTANT

## 2023-01-25 RX ORDER — NITROFURANTOIN 25; 75 MG/1; MG/1
100 CAPSULE ORAL 2 TIMES DAILY
Qty: 10 CAPSULE | Refills: 0 | Status: SHIPPED | OUTPATIENT
Start: 2023-01-25 | End: 2023-01-30

## 2023-01-25 NOTE — PATIENT INSTRUCTIONS
Dear Kathya Chino    After reviewing your responses, I've been able to diagnose you with a urinary tract infection, which is a common infection of the bladder with bacteria.  This is not a sexually transmitted infection, though urinating immediately after intercourse can help prevent infections.  Drinking lots of fluids is also helpful to clear your current infection and prevent the next one.      I have sent a prescription for antibiotics to your pharmacy to treat this infection.    It is important that you take all of your prescribed medication even if your symptoms are improving after a few doses.  Taking all of your medicine helps prevent the symptoms from returning.     If your symptoms worsen, you develop pain in your back or stomach, develop fevers, or are not improving in 5 days, please contact your primary care provider for an appointment or visit any of our convenient Walk-in or Urgent Care Centers to be seen, which can be found on our website here.    Thanks again for choosing us as your health care partner,    Julianna Sifuentes PA-C    Urinary Tract Infections in Women  Urinary tract infections (UTIs) are most often caused by bacteria. These bacteria enter the urinary tract. The bacteria may come from inside the body. Or they may travel from the skin outside the rectum or vagina into the urethra. Female anatomy makes it easy for bacteria from the bowel to enter a woman s urinary tract, which is the most common source of UTI. This means women develop UTIs more often than men. Pain in or around the urinary tract is a common UTI symptom. But the only way to know for sure if you have a UTI for the healthcare provider to test your urine. The two tests that may be done are the urinalysis and urine culture.     Types of UTIs    Cystitis. A bladder infection (cystitis) is the most common UTI in women. You may have urgent or frequent need to pee. You may also have pain, burning when you pee, and bloody  urine.    Urethritis. This is an inflamed urethra, which is the tube that carries urine from the bladder to outside the body. You may have lower stomach or back pain. You may also have urgent or frequent need to pee.    Pyelonephritis. This is a kidney infection. If not treated, it can be serious and damage your kidneys. In severe cases, you may need to stay in the hospital. You may have a fever and lower back pain.    Medicines to treat a UTI  Most UTIs are treated with antibiotics. These kill the bacteria. The length of time you need to take them depends on the type of infection. It may be as short as 3 days. If you have repeated UTIs, you may need a low-dose antibiotic for several months. Take antibiotics exactly as directed. Don t stop taking them until all of the medicine is gone. If you stop taking the antibiotic too soon, the infection may not go away. You may also develop a resistance to the antibiotic. This can make it much harder to treat.   Lifestyle changes to treat and prevent UTIs   The lifestyle changes below will help get rid of your UTI. They may also help prevent future UTIs.     Drink plenty of fluids. This includes water, juice, or other caffeine-free drinks. Fluids help flush bacteria out of your body.    Empty your bladder. Always empty your bladder when you feel the urge to pee. And always pee before going to sleep. Urine that stays in your bladder can lead to infection. Try to pee before and after sex as well.    Practice good personal hygiene. Wipe yourself from front to back after using the toilet. This helps keep bacteria from getting into the urethra.    Use condoms during sex. These help prevent UTIs caused by sexually transmitted bacteria. Also don't use spermicides during sex. These can increase the risk for UTIs. Choose other forms of birth control instead. For women who tend to get UTIs after sex, a low-dose of a preventive antibiotic may be used. Be sure to discuss this option with  your healthcare provider.    Follow up with your healthcare provider as directed. He or she may test to make sure the infection has cleared. If needed, more treatment may be started.  Mariely last reviewed this educational content on 7/1/2019 2000-2021 [employer]. All rights reserved. This information is not intended as a substitute for professional medical care. Always follow your healthcare professional's instructions.

## 2023-02-03 ENCOUNTER — E-VISIT (OUTPATIENT)
Dept: FAMILY MEDICINE | Facility: CLINIC | Age: 38
End: 2023-02-03
Payer: COMMERCIAL

## 2023-02-03 ENCOUNTER — APPOINTMENT (OUTPATIENT)
Dept: FAMILY MEDICINE | Facility: CLINIC | Age: 38
End: 2023-02-03
Attending: FAMILY MEDICINE
Payer: COMMERCIAL

## 2023-02-03 DIAGNOSIS — J02.0 STREPTOCOCCAL SORE THROAT: ICD-10-CM

## 2023-02-03 DIAGNOSIS — J02.9 SORE THROAT: Primary | ICD-10-CM

## 2023-02-03 LAB — DEPRECATED S PYO AG THROAT QL EIA: POSITIVE

## 2023-02-03 PROCEDURE — 87880 STREP A ASSAY W/OPTIC: CPT | Performed by: FAMILY MEDICINE

## 2023-02-03 PROCEDURE — 99207 PR NON-BILLABLE SERV PER CHARTING: CPT | Performed by: FAMILY MEDICINE

## 2023-02-03 RX ORDER — CEPHALEXIN 500 MG/1
500 CAPSULE ORAL 2 TIMES DAILY
Qty: 20 CAPSULE | Refills: 0 | Status: SHIPPED | OUTPATIENT
Start: 2023-02-03 | End: 2023-02-13

## 2023-02-03 NOTE — TELEPHONE ENCOUNTER
Contacted patient to schedule strep swab. Patient prefers [facility] for proximity. Patient transferred to central scheduling.

## 2023-02-03 NOTE — PATIENT INSTRUCTIONS
There is a viral illness that is currently circulating that has sore throat is one of the key symptoms.  Given your exposure however, I think we should check you for strep.  I gone ahead and placed an order.  You will need to come in the clinic for a swab.      Thank you for choosing us for your care. Given your symptoms, I would like you to do a lab-only visit to determine what is causing them.  I have placed the orders.  Please schedule an appointment with the lab right here in [facility], or call [phone].  I will let you know when the results are back and next steps to take.

## 2023-02-06 ENCOUNTER — MYC MEDICAL ADVICE (OUTPATIENT)
Dept: FAMILY MEDICINE | Facility: CLINIC | Age: 38
End: 2023-02-06
Payer: COMMERCIAL

## 2023-02-06 DIAGNOSIS — B37.31 YEAST INFECTION OF THE VAGINA: Primary | ICD-10-CM

## 2023-02-06 RX ORDER — FLUCONAZOLE 150 MG/1
150 TABLET ORAL ONCE
Qty: 1 TABLET | Refills: 0 | Status: SHIPPED | OUTPATIENT
Start: 2023-02-06 | End: 2023-02-06

## 2023-02-17 ENCOUNTER — VIRTUAL VISIT (OUTPATIENT)
Dept: FAMILY MEDICINE | Facility: CLINIC | Age: 38
End: 2023-02-17
Payer: COMMERCIAL

## 2023-02-17 DIAGNOSIS — F33.0 MAJOR DEPRESSIVE DISORDER, RECURRENT EPISODE, MILD (H): ICD-10-CM

## 2023-02-17 PROCEDURE — 99213 OFFICE O/P EST LOW 20 MIN: CPT | Mod: VID | Performed by: PHYSICIAN ASSISTANT

## 2023-02-17 RX ORDER — BUPROPION HYDROCHLORIDE 300 MG/1
300 TABLET ORAL EVERY MORNING
Qty: 90 TABLET | Refills: 1 | Status: SHIPPED | OUTPATIENT
Start: 2023-02-17 | End: 2023-03-28

## 2023-02-17 RX ORDER — CLONAZEPAM 0.5 MG/1
0.5 TABLET, ORALLY DISINTEGRATING ORAL
Qty: 20 TABLET | Refills: 0 | Status: SHIPPED | OUTPATIENT
Start: 2023-02-17 | End: 2023-06-02

## 2023-02-17 ASSESSMENT — ANXIETY QUESTIONNAIRES
8. IF YOU CHECKED OFF ANY PROBLEMS, HOW DIFFICULT HAVE THESE MADE IT FOR YOU TO DO YOUR WORK, TAKE CARE OF THINGS AT HOME, OR GET ALONG WITH OTHER PEOPLE?: VERY DIFFICULT
IF YOU CHECKED OFF ANY PROBLEMS ON THIS QUESTIONNAIRE, HOW DIFFICULT HAVE THESE PROBLEMS MADE IT FOR YOU TO DO YOUR WORK, TAKE CARE OF THINGS AT HOME, OR GET ALONG WITH OTHER PEOPLE: VERY DIFFICULT
GAD7 TOTAL SCORE: 17
1. FEELING NERVOUS, ANXIOUS, OR ON EDGE: NEARLY EVERY DAY
2. NOT BEING ABLE TO STOP OR CONTROL WORRYING: MORE THAN HALF THE DAYS
7. FEELING AFRAID AS IF SOMETHING AWFUL MIGHT HAPPEN: SEVERAL DAYS
3. WORRYING TOO MUCH ABOUT DIFFERENT THINGS: MORE THAN HALF THE DAYS
6. BECOMING EASILY ANNOYED OR IRRITABLE: NEARLY EVERY DAY
GAD7 TOTAL SCORE: 17
4. TROUBLE RELAXING: NEARLY EVERY DAY
5. BEING SO RESTLESS THAT IT IS HARD TO SIT STILL: NEARLY EVERY DAY
GAD7 TOTAL SCORE: 17
7. FEELING AFRAID AS IF SOMETHING AWFUL MIGHT HAPPEN: SEVERAL DAYS

## 2023-02-17 ASSESSMENT — PATIENT HEALTH QUESTIONNAIRE - PHQ9
SUM OF ALL RESPONSES TO PHQ QUESTIONS 1-9: 16
10. IF YOU CHECKED OFF ANY PROBLEMS, HOW DIFFICULT HAVE THESE PROBLEMS MADE IT FOR YOU TO DO YOUR WORK, TAKE CARE OF THINGS AT HOME, OR GET ALONG WITH OTHER PEOPLE: VERY DIFFICULT
SUM OF ALL RESPONSES TO PHQ QUESTIONS 1-9: 16

## 2023-02-17 NOTE — PROGRESS NOTES
Kathya is a 37 year old who is being evaluated via a billable video visit.      How would you like to obtain your AVS? MyChart  If the video visit is dropped, the invitation should be resent by: Text to cell phone: [phone]  Will anyone else be joining your video visit? No        Assessment & Plan     Alondra has a significant history of major depression and anxiety.  She states that she has no thoughts of harming herself or others.  She is denies street drugs or drinking alcohol.  She states that she uses them very sparingly but because of her increased anxiety right now with the separation and potential divorce ,agreed to give her a limited supply of clonazepam, 20 tablets and encouraged her to follow-up with her PCP to discuss increased depression right now.  Also advised that there are other medications that can work well in place of a benzo like hydroxyzine.  She has taken that in the past and did not do well with that I also suggested gabapentin as this might help with her anxiety and issues with sleeping.  She will discuss this with her PCP.     Major depressive disorder, recurrent episode, mild (H)    - buPROPion (WELLBUTRIN XL) 300 MG 24 hr tablet; Take 1 tablet (300 mg) by mouth every morning  - clonazePAM (KLONOPIN) 0.5 MG ODT; Take 1 tablet (0.5 mg) by mouth nightly as needed for anxiety    [id number]}         No follow-ups on file.    Ramona Ann Aaseby-Aguilera, PA-C [facility]   Kathya is a 37 year old, presenting for the following health issues:   Follow Up and Recheck Medication    Alondra has a history of significant depression and anxiety.  She is on Wellbutrin extended release 300 mg daily and Lexapro 20 mg daily.  At last visit with her PCP she was going to try to reduce the Lexapro but at this time she has not done that because she is going through a divorce and her anxiety is very high right now and not sleeping well.  She had a prescription for Klonopin in the  "past and uses this sporadically when her anxiety is high.  She states that she had a refill that she did not get.  And she is requesting a refill on Klonopin  History of Present Illness       Mental Health Follow-up:  Patient presents to follow-up on Depression & Anxiety.Patient's depression since last visit has been:  Worse  The patient is having other symptoms associated with depression.  Patient's anxiety since last visit has been:  Worse  The patient is having other symptoms associated with anxiety.  Any significant life events: relationship concerns and other  Patient is feeling anxious or having panic attacks.  Patient has no concerns about alcohol or drug use.    She eats 2-3 servings of fruits and vegetables daily.She consumes 1 sweetened beverage(s) daily.She exercises with enough effort to increase her heart rate 9 or less minutes per day.  She exercises with enough effort to increase her heart rate 3 or less days per week.   She is taking medications regularly.    Today's PHQ-9         PHQ-9 Total Score: 16    PHQ-9 Q9 Thoughts of better off dead/self-harm past 2 weeks :   Not at all    How difficult have these problems made it for you to do your work, take care of things at home, or get along with other people: Very difficult  Today's ZACK-7 Score: 17         Review of Systems   Constitutional, HEENT, cardiovascular, pulmonary, gi and gu systems are negative, except as otherwise noted.      Objective    Vitals - Patient Reported  Weight (Patient Reported): 81.6 kg (180 lb)  Height (Patient Reported): 168.9 cm (5' 6.5\")  BMI (Based on Pt Reported Ht/Wt): 28.62        Physical Exam   GENERAL: Healthy, alert and no distress  EYES: Eyes grossly normal to inspection.  No discharge or erythema, or obvious scleral/conjunctival abnormalities.  RESP: No audible wheeze, cough, or visible cyanosis.  No visible retractions or increased work of breathing.    SKIN: Visible skin clear. No significant rash, abnormal " pigmentation or lesions.  NEURO: Cranial nerves grossly intact.  Mentation and speech appropriate for age.  PSYCH: Mentation appears normal, affect normal/bright, judgement and insight intact, normal speech and appearance well-groomed.                Video-Visit Details    Type of service:  Video Visit     Originating Location (pt. Location): Home  Distant Location (provider location):  Off-site  Platform used for Video Visit: flexReceipts

## 2023-03-21 ASSESSMENT — PATIENT HEALTH QUESTIONNAIRE - PHQ9
SUM OF ALL RESPONSES TO PHQ QUESTIONS 1-9: 12
SUM OF ALL RESPONSES TO PHQ QUESTIONS 1-9: 12
10. IF YOU CHECKED OFF ANY PROBLEMS, HOW DIFFICULT HAVE THESE PROBLEMS MADE IT FOR YOU TO DO YOUR WORK, TAKE CARE OF THINGS AT HOME, OR GET ALONG WITH OTHER PEOPLE: VERY DIFFICULT

## 2023-03-21 ASSESSMENT — ANXIETY QUESTIONNAIRES
GAD7 TOTAL SCORE: 10
6. BECOMING EASILY ANNOYED OR IRRITABLE: MORE THAN HALF THE DAYS
GAD7 TOTAL SCORE: 10
7. FEELING AFRAID AS IF SOMETHING AWFUL MIGHT HAPPEN: NOT AT ALL
IF YOU CHECKED OFF ANY PROBLEMS ON THIS QUESTIONNAIRE, HOW DIFFICULT HAVE THESE PROBLEMS MADE IT FOR YOU TO DO YOUR WORK, TAKE CARE OF THINGS AT HOME, OR GET ALONG WITH OTHER PEOPLE: SOMEWHAT DIFFICULT
5. BEING SO RESTLESS THAT IT IS HARD TO SIT STILL: MORE THAN HALF THE DAYS
2. NOT BEING ABLE TO STOP OR CONTROL WORRYING: SEVERAL DAYS
8. IF YOU CHECKED OFF ANY PROBLEMS, HOW DIFFICULT HAVE THESE MADE IT FOR YOU TO DO YOUR WORK, TAKE CARE OF THINGS AT HOME, OR GET ALONG WITH OTHER PEOPLE?: SOMEWHAT DIFFICULT
1. FEELING NERVOUS, ANXIOUS, OR ON EDGE: MORE THAN HALF THE DAYS
4. TROUBLE RELAXING: MORE THAN HALF THE DAYS
7. FEELING AFRAID AS IF SOMETHING AWFUL MIGHT HAPPEN: NOT AT ALL
GAD7 TOTAL SCORE: 10
3. WORRYING TOO MUCH ABOUT DIFFERENT THINGS: SEVERAL DAYS

## 2023-03-28 ENCOUNTER — OFFICE VISIT (OUTPATIENT)
Dept: FAMILY MEDICINE | Facility: CLINIC | Age: 38
End: 2023-03-28
Payer: COMMERCIAL

## 2023-03-28 VITALS
RESPIRATION RATE: 16 BRPM | SYSTOLIC BLOOD PRESSURE: 111 MMHG | HEART RATE: 63 BPM | HEIGHT: 67 IN | WEIGHT: 176.25 LBS | TEMPERATURE: 97.7 F | DIASTOLIC BLOOD PRESSURE: 74 MMHG | OXYGEN SATURATION: 100 % | BODY MASS INDEX: 27.66 KG/M2

## 2023-03-28 DIAGNOSIS — F41.9 ANXIETY: ICD-10-CM

## 2023-03-28 DIAGNOSIS — F50.819 BINGE EATING DISORDER: ICD-10-CM

## 2023-03-28 DIAGNOSIS — E66.3 OVERWEIGHT (BMI 25.0-29.9): Primary | ICD-10-CM

## 2023-03-28 DIAGNOSIS — F33.0 MAJOR DEPRESSIVE DISORDER, RECURRENT EPISODE, MILD (H): ICD-10-CM

## 2023-03-28 DIAGNOSIS — Z86.39 HISTORY OF OBESITY: ICD-10-CM

## 2023-03-28 DIAGNOSIS — L65.0 TELOGEN EFFLUVIUM: ICD-10-CM

## 2023-03-28 DIAGNOSIS — L40.50 PSORIATIC ARTHRITIS (H): ICD-10-CM

## 2023-03-28 DIAGNOSIS — E88.819 INSULIN RESISTANCE: ICD-10-CM

## 2023-03-28 PROCEDURE — 90471 IMMUNIZATION ADMIN: CPT | Performed by: FAMILY MEDICINE

## 2023-03-28 PROCEDURE — 99215 OFFICE O/P EST HI 40 MIN: CPT | Mod: 25 | Performed by: FAMILY MEDICINE

## 2023-03-28 PROCEDURE — 96127 BRIEF EMOTIONAL/BEHAV ASSMT: CPT | Performed by: FAMILY MEDICINE

## 2023-03-28 PROCEDURE — 90677 PCV20 VACCINE IM: CPT | Performed by: FAMILY MEDICINE

## 2023-03-28 RX ORDER — ESCITALOPRAM OXALATE 20 MG/1
20 TABLET ORAL DAILY
Qty: 90 TABLET | Refills: 3 | Status: SHIPPED | OUTPATIENT
Start: 2023-03-28

## 2023-03-28 RX ORDER — BUPROPION HYDROCHLORIDE 300 MG/1
300 TABLET ORAL EVERY MORNING
Qty: 90 TABLET | Refills: 3 | Status: SHIPPED | OUTPATIENT
Start: 2023-03-28

## 2023-03-28 RX ORDER — BUPROPION HYDROCHLORIDE 300 MG/1
300 TABLET ORAL EVERY MORNING
Qty: 90 TABLET | Refills: 1 | Status: SHIPPED | OUTPATIENT
Start: 2023-03-28 | End: 2023-03-28

## 2023-03-28 RX ORDER — BUPROPION HYDROCHLORIDE 150 MG/1
150 TABLET ORAL EVERY MORNING
Qty: 90 TABLET | Refills: 1 | Status: SHIPPED | OUTPATIENT
Start: 2023-03-28 | End: 2023-07-20

## 2023-03-28 ASSESSMENT — ANXIETY QUESTIONNAIRES: GAD7 TOTAL SCORE: 10

## 2023-03-28 ASSESSMENT — PATIENT HEALTH QUESTIONNAIRE - PHQ9
10. IF YOU CHECKED OFF ANY PROBLEMS, HOW DIFFICULT HAVE THESE PROBLEMS MADE IT FOR YOU TO DO YOUR WORK, TAKE CARE OF THINGS AT HOME, OR GET ALONG WITH OTHER PEOPLE: VERY DIFFICULT
SUM OF ALL RESPONSES TO PHQ QUESTIONS 1-9: 12

## 2023-03-28 ASSESSMENT — ENCOUNTER SYMPTOMS: CONSTITUTIONAL NEGATIVE: 1

## 2023-03-28 NOTE — ASSESSMENT & PLAN NOTE
Anxiety and depression.  Symptoms ongoing, in particular given stress on the job as well as stress at home.  She has found Wellbutrin to be the most helpful medicine to help regulate mood.  Rare use of clonazepam with recent refill from another provider.  PDMP reviewed and no evidence of misuse or diversion.  - Increase bupropion extended release to 450 mg daily.  - If that goes well, she will consider decreasing escitalopram dosing from 20 mg daily down to 10 mg daily.  - Continue psychotherapy (which has an EMDR component)  - If she ends up on a stimulant with a goal of helping regulate appetite, food intake or weight we may need to adjust her medications further.

## 2023-03-28 NOTE — ASSESSMENT & PLAN NOTE
Currently in a flare.  This is managed by her specialist.  No recent changes to medications.  History of prednisone use.

## 2023-03-28 NOTE — ASSESSMENT & PLAN NOTE
37 year old year old female in clinic today to discuss treatment of the following conditions through diet and lifestyle modification and weight loss:  1. Overweight (BMI 25.0-29.9)    2. Major depressive disorder, recurrent episode, mild (H)    3. Anxiety    4. Binge eating disorder    5. Insulin resistance    6. History of obesity    7. Telogen effluvium    8. Psoriatic arthritis (H)      The patient's weight loss result since the last visit was mixed based on struggles with tolerance of Saxenda.  She was started on Saxenda for BMI greater than 27+ comorbidity.  Since then, she has been on and off of this medication struggling frequently with symptoms of nausea, vomiting and constipation.  She has had a strep throat infection as well as a urinary tract infection over the past month or 2 both of which have resulted in her having to start over with a titration.  Based on our conversation, she is only been on the medicine for a couple of months and she has not been on the medicine in a continuous fashion.  Semaglutide (Wegovy) is now widely available.  She is part of a structured program with ongoing follow-up.  I think this medication is medically indicated.  - Trial of semaglutide.  Given that she is currently taking 3.0 mg of Saxenda we will start her on 1.0 mg Wegovy which is roughly equivalent dose.  From there, we will titrate according to the 's schedule.  We will make her starting weight for this medication 176 pounds.    -If not covered, we would consider Vyvanse given component of binge eating or phentermine, medication that she at least temporarily has been successful at losing weight with in the past.

## 2023-03-28 NOTE — PROGRESS NOTES
Problem List Items Addressed This Visit     Binge eating disorder    Relevant Medications    Semaglutide-Weight Management (WEGOVY) 1 MG/0.5ML pen    Insulin resistance    Relevant Medications    Semaglutide-Weight Management (WEGOVY) 1 MG/0.5ML pen    Major depressive disorder, recurrent episode, mild (H)     Anxiety and depression.  Symptoms ongoing, in particular given stress on the job as well as stress at home.  She has found Wellbutrin to be the most helpful medicine to help regulate mood.  Rare use of clonazepam with recent refill from another provider.  PDMP reviewed and no evidence of misuse or diversion.  - Increase bupropion extended release to 450 mg daily.  - If that goes well, she will consider decreasing escitalopram dosing from 20 mg daily down to 10 mg daily.  - Continue psychotherapy (which has an EMDR component)  - If she ends up on a stimulant with a goal of helping regulate appetite, food intake or weight we may need to adjust her medications further.         Relevant Medications    buPROPion (WELLBUTRIN XL) 300 MG 24 hr tablet    buPROPion (WELLBUTRIN XL) 150 MG 24 hr tablet    escitalopram (LEXAPRO) 20 MG tablet    Overweight (BMI 25.0-29.9) - Primary     37 year old year old female in clinic today to discuss treatment of the following conditions through diet and lifestyle modification and weight loss:  1. Overweight (BMI 25.0-29.9)    2. Major depressive disorder, recurrent episode, mild (H)    3. Anxiety    4. Binge eating disorder    5. Insulin resistance    6. History of obesity    7. Telogen effluvium    8. Psoriatic arthritis (H)      The patient's weight loss result since the last visit was mixed based on struggles with tolerance of Saxenda.  She was started on Saxenda for BMI greater than 27+ comorbidity.  Since then, she has been on and off of this medication struggling frequently with symptoms of nausea, vomiting and constipation.  She has had a strep throat infection as well as a  "urinary tract infection over the past month or 2 both of which have resulted in her having to start over with a titration.  Based on our conversation, she is only been on the medicine for a couple of months and she has not been on the medicine in a continuous fashion.  Semaglutide (Wegovy) is now widely available.  She is part of a structured program with ongoing follow-up.  I think this medication is medically indicated.  - Trial of semaglutide.  Given that she is currently taking 3.0 mg of Saxenda we will start her on 1.0 mg Wegovy which is roughly equivalent dose.  From there, we will titrate according to the 's schedule.  We will make her starting weight for this medication 176 pounds.    -If not covered, we would consider Vyvanse given component of binge eating or phentermine, medication that she at least temporarily has been successful at losing weight with in the past.         Relevant Medications    Semaglutide-Weight Management (WEGOVY) 1 MG/0.5ML pen    Psoriatic arthritis (H)     Currently in a flare.  This is managed by her specialist.  No recent changes to medications.  History of prednisone use.         Telogen effluvium   Other Visit Diagnoses     Anxiety        Relevant Medications    buPROPion (WELLBUTRIN XL) 300 MG 24 hr tablet    buPROPion (WELLBUTRIN XL) 150 MG 24 hr tablet    escitalopram (LEXAPRO) 20 MG tablet    History of obesity        Relevant Medications    Semaglutide-Weight Management (WEGOVY) 1 MG/0.5ML pen             Leslee Rasheed is a 37 year old who presents for the following health issues   Chief Complaint   Patient presents with     Mental Health Problem     Weight Loss     Follow-up        Weight:   - saxenda: lots of side effects. Nausea, constipation.     - she has binged without ayaan    Anxiety and depression:    - \"A lot of changes.\" Taking care of niece and nephew.  She is considering separation from spouse.     - when she came off lexapro, she felt " "worsening symptoms.     Mental Health Problem    History of Present Illness       Mental Health Follow-up:  Patient presents to follow-up on Depression & Anxiety.Patient's depression since last visit has been:  Medium  The patient is having other symptoms associated with depression.  Patient's anxiety since last visit has been:  Medium  The patient is not having other symptoms associated with anxiety.  Any significant life events: relationship concerns  Patient is feeling anxious or having panic attacks.  Patient has no concerns about alcohol or drug use.    Reason for visit:  Anxiety/depression and weight management    She eats 2-3 servings of fruits and vegetables daily.She consumes 0 sweetened beverage(s) daily.She exercises with enough effort to increase her heart rate 9 or less minutes per day.  She exercises with enough effort to increase her heart rate 3 or less days per week.   She is taking medications regularly.    Today's PHQ-9         PHQ-9 Total Score: 12    PHQ-9 Q9 Thoughts of better off dead/self-harm past 2 weeks :   Not at all    How difficult have these problems made it for you to do your work, take care of things at home, or get along with other people: Very difficult  Today's ZACK-7 Score: 10       Review of Systems   Constitutional: Negative.    All other systems reviewed and are negative.           Objective    /74 (BP Location: Left arm, Patient Position: Sitting, Cuff Size: Adult Regular)   Pulse 63   Temp 97.7  F (36.5  C) (Oral)   Resp 16   Ht 1.689 m (5' 6.5\")   Wt 79.9 kg (176 lb 4 oz)   LMP 03/20/2023 (Approximate)   SpO2 100%   BMI 28.02 kg/m    Body mass index is 28.02 kg/m .     Physical Exam  Nursing note reviewed.   Constitutional:       General: She is not in acute distress.     Appearance: Normal appearance. She is not ill-appearing.   HENT:      Head: Normocephalic and atraumatic.   Eyes:      Extraocular Movements: Extraocular movements intact.      " Conjunctiva/sclera: Conjunctivae normal.   Pulmonary:      Effort: Pulmonary effort is normal.   Neurological:      Mental Status: She is alert and oriented to person, place, and time.   Psychiatric:         Attention and Perception: Attention normal.         Mood and Affect: Mood normal.         Speech: Speech normal.         Thought Content: Thought content normal.                43 minutes spent by me on the date of the encounter doing chart review, history and exam, documentation and further activities per the note    This note has been dictated using voice recognition software. Any grammatical or context distortions are unintentional and inherent to the software

## 2023-03-29 ENCOUNTER — TELEPHONE (OUTPATIENT)
Dept: FAMILY MEDICINE | Facility: CLINIC | Age: 38
End: 2023-03-29
Payer: COMMERCIAL

## 2023-03-29 NOTE — TELEPHONE ENCOUNTER
Prior Authorization Request  Who s requesting:  Pharmacy  Pharmacy Name and Location: [facility], [place], [place] - [place], [place] - [address]  Medication Name: Semaglutide-Weight Management (WEGOVY) 1 MG/0.5ML pen  Insurance Plan: [facility] CORE  Insurance Member ID Number:  [id number]  CoverMy[employer]eds [place]: [id number]  Informed patient that prior authorizations can take up to 10 business days for response:   NA  Okay to leave a detailed message: No     Route to pool:  [facility] CISNER[facility]

## 2023-03-30 NOTE — TELEPHONE ENCOUNTER
Prior Authorization Not Needed per Insurance    Medication: Semaglutide-Weight Management (WEGOVY) 1 MG/0.5ML pen  Insurance Company: [employer] - Phone [phone] Fax [phone]  Expected CoPay:      Pharmacy Filling the Rx: [facility], [place] - [address]  Pharmacy Notified: Yes  Patient Notified: No  PA already approved via EPA

## 2023-04-10 ENCOUNTER — MYC MEDICAL ADVICE (OUTPATIENT)
Dept: FAMILY MEDICINE | Facility: CLINIC | Age: 38
End: 2023-04-10
Payer: COMMERCIAL

## 2023-04-10 DIAGNOSIS — F50.819 BINGE EATING DISORDER: ICD-10-CM

## 2023-04-10 DIAGNOSIS — Z86.39 HISTORY OF OBESITY: ICD-10-CM

## 2023-04-10 DIAGNOSIS — E88.819 INSULIN RESISTANCE: ICD-10-CM

## 2023-04-10 DIAGNOSIS — E66.3 OVERWEIGHT (BMI 25.0-29.9): ICD-10-CM

## 2023-04-10 NOTE — TELEPHONE ENCOUNTER
I did get a PA for Krystina but I need it to be sent to a preferred pharmacy. Current out of pocket is $1300. Please send all medications to [facility] pharmacy    Medication has been resent to the requested pharmacy.

## 2023-04-11 ENCOUNTER — MYC MEDICAL ADVICE (OUTPATIENT)
Dept: FAMILY MEDICINE | Facility: CLINIC | Age: 38
End: 2023-04-11
Payer: COMMERCIAL

## 2023-04-11 DIAGNOSIS — F50.819 BINGE EATING DISORDER: ICD-10-CM

## 2023-04-11 DIAGNOSIS — E88.819 INSULIN RESISTANCE: ICD-10-CM

## 2023-04-11 DIAGNOSIS — E66.3 OVERWEIGHT (BMI 25.0-29.9): Primary | ICD-10-CM

## 2023-04-14 RX ORDER — PHENTERMINE HYDROCHLORIDE 30 MG/1
30 CAPSULE ORAL EVERY MORNING
Qty: 90 CAPSULE | Refills: 0 | Status: SHIPPED | OUTPATIENT
Start: 2023-04-14 | End: 2023-07-24

## 2023-05-05 ENCOUNTER — OFFICE VISIT (OUTPATIENT)
Dept: INTERNAL MEDICINE | Facility: CLINIC | Age: 38
End: 2023-05-05
Payer: COMMERCIAL

## 2023-05-05 VITALS
OXYGEN SATURATION: 99 % | HEART RATE: 91 BPM | WEIGHT: 171.5 LBS | DIASTOLIC BLOOD PRESSURE: 72 MMHG | SYSTOLIC BLOOD PRESSURE: 108 MMHG | BODY MASS INDEX: 26.92 KG/M2 | RESPIRATION RATE: 16 BRPM | HEIGHT: 67 IN | TEMPERATURE: 98.2 F

## 2023-05-05 DIAGNOSIS — H81.11 BENIGN PAROXYSMAL POSITIONAL VERTIGO, RIGHT: ICD-10-CM

## 2023-05-05 PROCEDURE — 99203 OFFICE O/P NEW LOW 30 MIN: CPT | Performed by: INTERNAL MEDICINE

## 2023-05-05 RX ORDER — ONDANSETRON 4 MG/1
4 TABLET, ORALLY DISINTEGRATING ORAL EVERY 8 HOURS PRN
Qty: 15 TABLET | Refills: 0 | Status: SHIPPED | OUTPATIENT
Start: 2023-05-05 | End: 2024-08-09

## 2023-05-05 RX ORDER — MECLIZINE HYDROCHLORIDE 25 MG/1
25 TABLET ORAL 3 TIMES DAILY PRN
Refills: 0 | COMMUNITY
Start: 2023-05-05 | End: 2024-08-09

## 2023-05-05 ASSESSMENT — PATIENT HEALTH QUESTIONNAIRE - PHQ9
SUM OF ALL RESPONSES TO PHQ QUESTIONS 1-9: 10
SUM OF ALL RESPONSES TO PHQ QUESTIONS 1-9: 10
10. IF YOU CHECKED OFF ANY PROBLEMS, HOW DIFFICULT HAVE THESE PROBLEMS MADE IT FOR YOU TO DO YOUR WORK, TAKE CARE OF THINGS AT HOME, OR GET ALONG WITH OTHER PEOPLE: SOMEWHAT DIFFICULT

## 2023-05-05 NOTE — PROGRESS NOTES
"  Assessment & Plan     Benign paroxysmal positional vertigo, right  37-year-old woman is here with 3-day history of cute onset of vertigo associated with nausea.  Recent dental work including implant.  Having some discomfort in that area but she describes some fullness in her ear.  No tinnitus or ear pain.  No severe headache.  There is associated vertigo.  Holding her head still will improve symptoms temporarily.  There is associated nausea and she has nearly thrown up on several occasions.  She tried some Dramamine and Benadryl with only minimal benefit.  No other worrisome neurologic symptoms.  Exam with normal TMs bilaterally.  Neurologically intact both cranial nerves and motor.  No carotid bruits and heart regular rate and rhythm.  Symptoms consistent with benign vertigo either positional vertigo from otolith or vestibulitis.  We discussed benign nature of vertigo and treatment strategies.  We will try to control symptoms with combination of meclizine 25 mg up to 3 times daily as needed and the use of Zofran for nausea.  Hopefully symptoms will resolve over the next week.  If not, she may need to see a physical therapist to discuss performing a head repositioning maneuver/Epley maneuver.  I am providing her a referral to [facility] if this is needed.  - ondansetron (ZOFRAN ODT) 4 MG ODT tab; Take 1 tablet (4 mg) by mouth every 8 hours as needed for nausea  - meclizine (ANTIVERT) 25 MG tablet; Take 1 tablet (25 mg) by mouth 3 times daily as needed for dizziness      23 minutes spent by me on the date of the encounter doing chart review, history and exam, documentation and further activities per the note       BMI:   Estimated body mass index is 27.27 kg/m  as calculated from the following:    Height as of this encounter: 1.689 m (5' 6.5\").    Weight as of this encounter: 77.8 kg (171 lb 8 oz).           Eliud Pretty MD  [facility] " "SATNAM    Subjective   Kathya is a 37 year old, presenting for the following health issues:  Dizziness (Vertigo about a week ago right after the dental implant /It getting worse )        5/5/2023    10:23 AM   Additional Questions   Roomed by DANYELLE DUMONT     History of Present Illness       Reason for visit:  Vertigo after dental implant  Symptom onset:  3-7 days ago    She eats 2-3 servings of fruits and vegetables daily.She consumes 0 sweetened beverage(s) daily.She exercises with enough effort to increase her heart rate 10 to 19 minutes per day.  She exercises with enough effort to increase her heart rate 4 days per week.   She is taking medications regularly.    Today's PHQ-9         PHQ-9 Total Score: 10    PHQ-9 Q9 Thoughts of better off dead/self-harm past 2 weeks :   Not at all    How difficult have these problems made it for you to do your work, take care of things at home, or get along with other people: Somewhat difficult               Review of Systems   No severe headache      Objective    /72 (BP Location: Right arm, Patient Position: Sitting, Cuff Size: Adult Regular)   Pulse 91   Temp 98.2  F (36.8  C) (Oral)   Resp 16   Ht 1.689 m (5' 6.5\")   Wt 77.8 kg (171 lb 8 oz)   LMP 04/28/2023 (Approximate)   SpO2 99%   BMI 27.27 kg/m    Body mass index is 27.27 kg/m .  Physical Exam   Young woman who does not appear acutely ill  HEENT normal.  TMs normal.  Neck without carotid bruits.  Heart regular rate and rhythm  Neurologically intact with cranial nerves and motor        "

## 2023-05-05 NOTE — PATIENT INSTRUCTIONS
If symptoms are not resolving/improving over the next week, I would recommend seeing a physical therapist.  This can be either through [facility] or you can contact [facility]

## 2023-06-01 ENCOUNTER — MYC REFILL (OUTPATIENT)
Dept: FAMILY MEDICINE | Facility: CLINIC | Age: 38
End: 2023-06-01
Payer: COMMERCIAL

## 2023-06-01 DIAGNOSIS — F33.0 MAJOR DEPRESSIVE DISORDER, RECURRENT EPISODE, MILD (H): ICD-10-CM

## 2023-06-01 NOTE — TELEPHONE ENCOUNTER
Routing refill request to provider for review/approval because:  Drug not on the [facility] refill protocol     Julia SERRANO RN

## 2023-06-02 DIAGNOSIS — F33.0 MAJOR DEPRESSIVE DISORDER, RECURRENT EPISODE, MILD (H): ICD-10-CM

## 2023-06-02 RX ORDER — CLONAZEPAM 0.5 MG/1
0.5 TABLET, ORALLY DISINTEGRATING ORAL
Qty: 20 TABLET | Refills: 0 | OUTPATIENT
Start: 2023-06-02

## 2023-06-02 RX ORDER — CLONAZEPAM 0.5 MG/1
0.5 TABLET, ORALLY DISINTEGRATING ORAL
Qty: 20 TABLET | Refills: 0 | Status: SHIPPED | OUTPATIENT
Start: 2023-06-02 | End: 2023-08-02

## 2023-06-02 NOTE — TELEPHONE ENCOUNTER
"Kathya is calling in to discuss her refill of Clonazepam.  Kathya states that she spoke to Dr Cooper about this medication at the 3/28/23 OV.  She is getting the refill denied because of \"needs to see her PCP.\"      Pended refill for review and if agree, approval.      Routing to Dr Cooper.  "

## 2023-07-19 DIAGNOSIS — F33.0 MAJOR DEPRESSIVE DISORDER, RECURRENT EPISODE, MILD (H): ICD-10-CM

## 2023-07-19 DIAGNOSIS — F41.9 ANXIETY: ICD-10-CM

## 2023-07-19 NOTE — TELEPHONE ENCOUNTER
"Routing refill request to provider for review/approval because:  PHQ-9 score =10 on 5/2/23    Last Written Prescription Date:  3/28/23  Last Fill Quantity: 90,  # refills: 1   Last office visit provider:  5/5/23     Requested Prescriptions   Pending Prescriptions Disp Refills     buPROPion (WELLBUTRIN XL) 150 MG 24 hr tablet [Pharmacy Med Name: BUPROPION XL 150MG TABLETS (24 H)] 90 tablet 1     Sig: TAKE 1 TABLET(150 MG) BY MOUTH EVERY MORNING       [facility] Protocol Failed - 7/19/2023  6:48 PM        Failed - PHQ-9 score less than 5 in past 6 months     Please review last PHQ-9 score.           Passed - Medication is Bupropion     If the medication is Bupropion (Wellbutrin), and the patient is taking for smoking cessation; OK to refill.          Passed - Medication is active on med list        Passed - Patient is age 18 or older        Passed - No active pregnancy on record        Passed - No positive pregnancy test in last 12 months        Passed - Recent (6 mo) or future (30 days) visit within the authorizing provider's specialty     Patient had office visit in the last 6 months or has a visit in the next 30 days with authorizing provider or within the authorizing provider's specialty.  See \"Patient Info\" tab in inbasket, or \"Choose Columns\" in Meds & Orders section of the refill encounter.                 Carmella Salcido RN 07/19/23 6:50 PM  "

## 2023-07-20 RX ORDER — BUPROPION HYDROCHLORIDE 150 MG/1
TABLET ORAL
Qty: 90 TABLET | Refills: 1 | Status: SHIPPED | OUTPATIENT
Start: 2023-07-20

## 2023-07-24 ENCOUNTER — MYC REFILL (OUTPATIENT)
Dept: FAMILY MEDICINE | Facility: CLINIC | Age: 38
End: 2023-07-24
Payer: COMMERCIAL

## 2023-07-24 DIAGNOSIS — E88.819 INSULIN RESISTANCE: ICD-10-CM

## 2023-07-24 DIAGNOSIS — E66.3 OVERWEIGHT (BMI 25.0-29.9): ICD-10-CM

## 2023-07-24 DIAGNOSIS — F50.819 BINGE EATING DISORDER: ICD-10-CM

## 2023-07-24 RX ORDER — PHENTERMINE HYDROCHLORIDE 30 MG/1
30 CAPSULE ORAL EVERY MORNING
Qty: 90 CAPSULE | Refills: 0 | Status: SHIPPED | OUTPATIENT
Start: 2023-07-24 | End: 2023-11-01

## 2023-07-28 RX ORDER — ADALIMUMAB 40MG/0.4ML
KIT SUBCUTANEOUS
Status: CANCELLED | OUTPATIENT
Start: 2023-07-28

## 2023-08-02 ENCOUNTER — E-VISIT (OUTPATIENT)
Dept: URGENT CARE | Facility: CLINIC | Age: 38
End: 2023-08-02
Payer: COMMERCIAL

## 2023-08-02 ENCOUNTER — MYC REFILL (OUTPATIENT)
Dept: FAMILY MEDICINE | Facility: CLINIC | Age: 38
End: 2023-08-02

## 2023-08-02 DIAGNOSIS — F33.0 MAJOR DEPRESSIVE DISORDER, RECURRENT EPISODE, MILD (H): ICD-10-CM

## 2023-08-02 DIAGNOSIS — R30.0 DYSURIA: Primary | ICD-10-CM

## 2023-08-02 PROCEDURE — 99207 PR NON-BILLABLE SERV PER CHARTING: CPT | Performed by: PREVENTIVE MEDICINE

## 2023-08-02 RX ORDER — CLONAZEPAM 0.5 MG/1
0.5 TABLET, ORALLY DISINTEGRATING ORAL
Qty: 20 TABLET | Refills: 0 | Status: SHIPPED | OUTPATIENT
Start: 2023-08-02 | End: 2023-09-19

## 2023-08-02 NOTE — PATIENT INSTRUCTIONS
Dear Kathya Chino,     After reviewing your responses, I would like you to come in for a urine test to make sure we treat you correctly. This urine test is to evaluate you for a possible urinary tract infection, and should be scheduled for today or tomorrow. Schedule a Lab Only appointment here.     Lab appointments are not available at most locations on the weekends. If no Lab Only appointment is available, you should be seen in any of our convenient Walk-in or Urgent Care Centers, which can be found on our website here.     You will receive instructions with your results in [facility] once they are available.     If your symptoms worsen, you develop pain in your back or stomach, develop fevers, or are not improving in 5 days, please contact your primary care provider for an appointment or visit a Walk-in or Urgent Care Center to be seen.     Thanks again for choosing us as your health care partner,     Carlos Baumann MD, MD

## 2023-08-03 ENCOUNTER — LAB (OUTPATIENT)
Dept: LAB | Facility: CLINIC | Age: 38
End: 2023-08-03
Payer: COMMERCIAL

## 2023-08-03 DIAGNOSIS — R30.0 DYSURIA: ICD-10-CM

## 2023-08-03 PROCEDURE — 87086 URINE CULTURE/COLONY COUNT: CPT

## 2023-08-05 LAB — BACTERIA UR CULT: NORMAL

## 2023-08-09 ENCOUNTER — OFFICE VISIT (OUTPATIENT)
Dept: MIDWIFE SERVICES | Facility: CLINIC | Age: 38
End: 2023-08-09
Payer: COMMERCIAL

## 2023-08-09 VITALS
HEART RATE: 83 BPM | DIASTOLIC BLOOD PRESSURE: 76 MMHG | SYSTOLIC BLOOD PRESSURE: 118 MMHG | OXYGEN SATURATION: 99 % | WEIGHT: 158 LBS | BODY MASS INDEX: 25.12 KG/M2

## 2023-08-09 DIAGNOSIS — Z30.09 GENERAL COUNSELING FOR PRESCRIPTION OF ORAL CONTRACEPTIVES: ICD-10-CM

## 2023-08-09 DIAGNOSIS — Z11.3 SCREEN FOR STD (SEXUALLY TRANSMITTED DISEASE): Primary | ICD-10-CM

## 2023-08-09 DIAGNOSIS — R30.0 DYSURIA: ICD-10-CM

## 2023-08-09 LAB
ALBUMIN UR-MCNC: NEGATIVE MG/DL
APPEARANCE UR: CLEAR
BILIRUB UR QL STRIP: NEGATIVE
CLUE CELLS: ABNORMAL
COLOR UR AUTO: YELLOW
GLUCOSE UR STRIP-MCNC: NEGATIVE MG/DL
HGB UR QL STRIP: NEGATIVE
KETONES UR STRIP-MCNC: NEGATIVE MG/DL
LEUKOCYTE ESTERASE UR QL STRIP: NEGATIVE
NITRATE UR QL: NEGATIVE
PH UR STRIP: 6.5 [PH] (ref 5–8)
SP GR UR STRIP: 1.01 (ref 1–1.03)
T PALLIDUM AB SER QL: NONREACTIVE
TRICHOMONAS, WET PREP: ABNORMAL
UROBILINOGEN UR STRIP-ACNC: 0.2 E.U./DL
WBC'S/HIGH POWER FIELD, WET PREP: ABNORMAL
YEAST, WET PREP: ABNORMAL

## 2023-08-09 PROCEDURE — 87491 CHLMYD TRACH DNA AMP PROBE: CPT | Performed by: ADVANCED PRACTICE MIDWIFE

## 2023-08-09 PROCEDURE — 99204 OFFICE O/P NEW MOD 45 MIN: CPT | Performed by: ADVANCED PRACTICE MIDWIFE

## 2023-08-09 PROCEDURE — 87591 N.GONORRHOEAE DNA AMP PROB: CPT | Performed by: ADVANCED PRACTICE MIDWIFE

## 2023-08-09 PROCEDURE — 86803 HEPATITIS C AB TEST: CPT | Performed by: ADVANCED PRACTICE MIDWIFE

## 2023-08-09 PROCEDURE — 87210 SMEAR WET MOUNT SALINE/INK: CPT | Performed by: ADVANCED PRACTICE MIDWIFE

## 2023-08-09 PROCEDURE — 86780 TREPONEMA PALLIDUM: CPT | Performed by: ADVANCED PRACTICE MIDWIFE

## 2023-08-09 PROCEDURE — 81003 URINALYSIS AUTO W/O SCOPE: CPT | Performed by: ADVANCED PRACTICE MIDWIFE

## 2023-08-09 PROCEDURE — 87340 HEPATITIS B SURFACE AG IA: CPT | Performed by: ADVANCED PRACTICE MIDWIFE

## 2023-08-09 PROCEDURE — 36415 COLL VENOUS BLD VENIPUNCTURE: CPT | Performed by: ADVANCED PRACTICE MIDWIFE

## 2023-08-09 PROCEDURE — 87389 HIV-1 AG W/HIV-1&-2 AB AG IA: CPT | Performed by: ADVANCED PRACTICE MIDWIFE

## 2023-08-09 RX ORDER — NORETHINDRONE ACETATE AND ETHINYL ESTRADIOL .03; 1.5 MG/1; MG/1
1 TABLET ORAL DAILY
Qty: 84 TABLET | Refills: 1 | Status: SHIPPED | OUTPATIENT
Start: 2023-08-09 | End: 2023-11-01

## 2023-08-09 NOTE — PROGRESS NOTES
"    Subjective:    Kathya Chino is a 37 year old female who presents for birth control consultation and STD screening due to infidelity of spouse. Patient states she is unsure how long spouse has been unfaithful but desires testing today. Reports lower abdominal pain for the last 2 months that is centered over her bladder. Reports being treated for UTI in June with some resolution of symptoms initially but symptoms then returned a few days later. Denies any changes in vaginal discharge but has noticed some mucus in her urine.  She is an existing [facility] patient but new to [facility] care. Current contraception method: same sex partner and vasectomy--current partner.Sexually transmitted infection risk: moderate due to spouse infidelity. Patient unsure of number of partners spouse has had. LMP 8/6/23. Menstrual flow: regular q 28-30 days. Has used oral contraceptive pill  for contraception in the past. Reports an increase in mood changes that are \"extreme\" a few days before her menses and continue until midcycle. Reports feelings of anger, frustration and sadness with menstrual cycle. These symptoms have been present for months and always improve after menstrual cycle. Pertinent past medical history: None.    Patient's last menstrual period was 08/06/2023.      The following portions of the patient's history were reviewed and updated as appropriate: allergies, current medications, past family history, past medical history, past social history, past surgical history, and problem list.    Review of Systems  Pertinent items are noted in HPI.      Objective:     /76 (BP Location: Right arm, Patient Position: Sitting, Cuff Size: Adult Regular)   Pulse 83   Wt 71.7 kg (158 lb)   LMP 08/06/2023   SpO2 99%   BMI 25.12 kg/m    Physical Exam:  General Appearance: Alert, cooperative, no distress, appears stated age  Skin: Skin color, texture, turgor normal, no rashes or lesions.  Throat: Lips, mucosa, and tongue " normal; teeth and gums normal  HEENT: grossly normal; otoscopic and opthalmic exam not performed.   Neck: Supple, symmetrical, trachea midline, no adenopathy  Respiratory: Normal respiratory effort  Cardiovascular: No peripheral edema.  Pelvic Exam:  Vulva: No external lesions, normal hair distribution, no adenopathy  Vagina: Moist, pink, no abnormal discharge, well rugated, no lesions  Cervix: parous, smooth, pink, no visible lesions, mild CMT present on exam   Uterus: Normal size, anteverted, non-tender, mobile  Ovaries: No mass, non-tender, mobile  Musculoskeletal: No digital cyanosis. Normal gait and station. Moves all limbs freely.  Neuro: Cranial nerves II-XII grossly intact.  Psych: Intact judgment and insight. OX3 and conversational.     Assessment:   37 year old, starting oral contraceptive, no contraindications  Premenstrual symptoms   At risk for STD  Birth control consult  STD screening   Mild cervical motion tenderness present     Plan:     -After discussion of methods, would like to use oral contraceptive pill to help with premenstrual symptoms.   -Verbal information given on this method. ACES (OCPs) warning signs reviewed.   -Labs GC/CT, Wet prep, HIV, Hep C, Hep B, RPR for STD screening  -UA for dysuria and mucus in urine  -Rx written for oral contraceptive pill and sent to pharmacy on file.  -Reviewed ACHES and when to seek medical attention    -Await lab results   -Will return to clinic for blood pressure check and follow up in 3 months.   -Return to clinic in three months for reevaluation.       Time spent:  Face-to-face visit: 25 minutes with >50% on education, counseling and coordination of care.   Documentation:  15 minutes  Total time spent on day of service:  40-54 minutes      JENIFER Castillo, JOJO, SAMANTANP

## 2023-08-10 ENCOUNTER — OFFICE VISIT (OUTPATIENT)
Dept: FAMILY MEDICINE | Facility: CLINIC | Age: 38
End: 2023-08-10
Payer: COMMERCIAL

## 2023-08-10 VITALS
DIASTOLIC BLOOD PRESSURE: 60 MMHG | TEMPERATURE: 97.5 F | RESPIRATION RATE: 17 BRPM | SYSTOLIC BLOOD PRESSURE: 110 MMHG | OXYGEN SATURATION: 99 % | HEART RATE: 75 BPM | WEIGHT: 158 LBS | HEIGHT: 67 IN | BODY MASS INDEX: 24.8 KG/M2

## 2023-08-10 DIAGNOSIS — L40.50 PSORIATIC ARTHRITIS (H): ICD-10-CM

## 2023-08-10 DIAGNOSIS — J02.9 ACUTE PHARYNGITIS, UNSPECIFIED ETIOLOGY: Primary | ICD-10-CM

## 2023-08-10 LAB
C TRACH DNA SPEC QL PROBE+SIG AMP: NEGATIVE
DEPRECATED S PYO AG THROAT QL EIA: NEGATIVE
GROUP A STREP BY PCR: NOT DETECTED
HBV SURFACE AG SERPL QL IA: NONREACTIVE
HCV AB SERPL QL IA: NONREACTIVE
HIV 1+2 AB+HIV1 P24 AG SERPL QL IA: NONREACTIVE
N GONORRHOEA DNA SPEC QL NAA+PROBE: NEGATIVE

## 2023-08-10 PROCEDURE — 87651 STREP A DNA AMP PROBE: CPT | Performed by: FAMILY MEDICINE

## 2023-08-10 PROCEDURE — 99214 OFFICE O/P EST MOD 30 MIN: CPT | Performed by: FAMILY MEDICINE

## 2023-08-10 PROCEDURE — 87635 SARS-COV-2 COVID-19 AMP PRB: CPT | Performed by: FAMILY MEDICINE

## 2023-08-10 RX ORDER — CEPHALEXIN 500 MG/1
500 CAPSULE ORAL 2 TIMES DAILY
Qty: 20 CAPSULE | Refills: 0 | Status: SHIPPED | OUTPATIENT
Start: 2023-08-10 | End: 2023-08-20

## 2023-08-10 ASSESSMENT — PATIENT HEALTH QUESTIONNAIRE - PHQ9
SUM OF ALL RESPONSES TO PHQ QUESTIONS 1-9: 12
10. IF YOU CHECKED OFF ANY PROBLEMS, HOW DIFFICULT HAVE THESE PROBLEMS MADE IT FOR YOU TO DO YOUR WORK, TAKE CARE OF THINGS AT HOME, OR GET ALONG WITH OTHER PEOPLE: VERY DIFFICULT
SUM OF ALL RESPONSES TO PHQ QUESTIONS 1-9: 12

## 2023-08-10 ASSESSMENT — PAIN SCALES - GENERAL: PAINLEVEL: SEVERE PAIN (6)

## 2023-08-10 NOTE — PROGRESS NOTES
"Assessment/Plan:    Acute pharyngitis, unspecified etiology  Acute pharyngitis reviewed.  Rapid strep negative.  Culture pending.  Did send cephalexin 500 mg twice daily x 10 days empirically with history of streptococcal pharyngitis historically.  Will notify with culture results once available.  COVID testing pending.  Relatively benign exam noted.  - Streptococcus A Rapid Screen w/Reflex to PCR - Clinic Collect  - Symptomatic COVID-19 Virus (Coronavirus) by PCR Nasopharyngeal  - Group A Streptococcus PCR Throat Swab  - cephALEXin (KEFLEX) 500 MG capsule  Dispense: 20 capsule; Refill: 0    Psoriatic arthritis (H)  Psoriatic arthritis.  Continues methotrexate injections weekly.  Stable.          Subjective:    Kathya Chino is seen today for sore throat.  Onset yesterday.  Symptoms began came on fast.  History of strep earlier this year February 3, 2023 and was treated with cephalexin at that time due to drug interactions with other antibiotic with methotrexate.  Tolerated medicine fine at that time.  Her kids have had sore throat a couple days ago but better.  She thought maybe it was because they were screaming a lot.  Patient has not had fever or chills.  No nausea or vomiting.  Appetites stable.  Receiving treatment for history of obesity.  Psoriatic arthritis.  Remains on methotrexate.  Patient has had COVID bivalent vaccination 2022.  No other exposure.  Comprehensive review of systems as above otherwise all negative.       -   1 son -   1 daughter  Tobacco:  none  EtOH:  infrequent  Mom - lung CA (smoker previously)  Dad -  58 due to MI; high cholesterol; prostate CA  1 older bro -  age 35 due to \"heart attack\"  1 younger sis -   Surgeries:  laparoscopy due to endometriosis  Hospitalizations:  none  Work:  [employer]  Hobbies:  gardening, spending time with her children      Past Surgical History:   Procedure Laterality Date    ABDOMEN " SURGERY  2004    Laparoscopy    BIOPSY  2004    EYE SURGERY       DILATION/CURETTAGE DIAG/THER NON OB      Description: Dilation And Curettage;  Proc Date: 2010;  Comments: s/p delivery for post-partum bleeding        Family History   Problem Relation Age of Onset    Family History Negative Mother         colon polyps    Diabetes Mother         type 2    Lung Cancer Mother     Chronic Obstructive Pulmonary Disease Father     Hypertension Father     Prostate Cancer Father     Coronary Artery Disease Father     Alzheimer Disease Maternal Grandmother     Melanoma Maternal Grandmother     Colon Cancer Maternal Grandfather     Coronary Artery Disease Brother         atherosclerosis and cardiac arrest.  at 35    Anxiety Disorder Sister     Obesity Sister     Depression Sister     Autism Spectrum Disorder Son     Autism Spectrum Disorder Daughter     Other - See Comments Daughter         passed away during birth     Melanoma Maternal Aunt     Thyroid Cancer No family hx of         Past Medical History:   Diagnosis Date    Anxiety     Benign paroxysmal positional vertigo, right 2023    Depressive disorder     Dysmenorrhea     Endometriosis     Joint pain     Mental disorder     anxiety    Migraine     Psoriatic arthritis (H)     Stillbirth of single fetus     32wks, breech        Social History     Tobacco Use    Smoking status: Never     Passive exposure: Past    Smokeless tobacco: Never   Vaping Use    Vaping Use: Never used   Substance Use Topics    Alcohol use: No    Drug use: No        Current Outpatient Medications   Medication Sig Dispense Refill    buPROPion (WELLBUTRIN XL) 150 MG 24 hr tablet TAKE 1 TABLET(150 MG) BY MOUTH EVERY MORNING 90 tablet 1    buPROPion (WELLBUTRIN XL) 300 MG 24 hr tablet Take 1 tablet (300 mg) by mouth every morning (total dose: 150 mg + 300 mg = 450 mg) 90 tablet 3    calcium carbonate 500 mg, elemental, (OSCAL 500) 1250 (500 Ca) MG TABS tablet Take 2 tablets  "by mouth daily      cephALEXin (KEFLEX) 500 MG capsule Take 1 capsule (500 mg) by mouth 2 times daily for 10 days 20 capsule 0    clonazePAM (KLONOPIN) 0.5 MG ODT Take 1 tablet (0.5 mg) by mouth nightly as needed for anxiety 20 tablet 0    escitalopram (LEXAPRO) 20 MG tablet Take 1 tablet (20 mg) by mouth daily 90 tablet 3    folic acid (FOLVITE) 1 MG tablet TAKE 2 TABLETS BY MOUTH EVERY MORNING      HUMIRA *CF* PEN 40 MG/0.4ML pen kit       meclizine (ANTIVERT) 25 MG tablet Take 1 tablet (25 mg) by mouth 3 times daily as needed for dizziness  0    methotrexate 50 MG/2ML injection INJECT 0.7 ML SUBCUTANEOUSLY ONCE WEEKLY      norethindrone-ethinyl estradiol (MICROGESTIN 1.5/30) 1.5-30 MG-MCG tablet Take 1 tablet by mouth daily 84 tablet 1    ondansetron (ZOFRAN ODT) 4 MG ODT tab Take 1 tablet (4 mg) by mouth every 8 hours as needed for nausea 15 tablet 0    phentermine (ADIPEX-P) 30 MG capsule Take 1 capsule (30 mg) by mouth every morning 90 capsule 0    predniSONE (DELTASONE) 5 MG tablet as needed      Vitamin D3 (CHOLECALCIFEROL) 125 MCG (5000 UT) tablet Take 1 tablet by mouth daily      BD SAFETYGLIDE SYRINGE/NEEDLE 27G X 5/8\" 1 ML MISC TO BE USED WITH INJECTABLE METHOTREXATE      Semaglutide-Weight Management (WEGOVY) 1 MG/0.5ML pen Inject 1 mg Subcutaneous once a week (Patient not taking: Reported on 5/5/2023) 2 mL 0          Objective:    Vitals:    08/10/23 0838   BP: 110/60   Pulse: 75   Resp: 17   Temp: 97.5  F (36.4  C)   SpO2: 99%   Weight: 71.7 kg (158 lb)   Height: 1.689 m (5' 6.5\")      Body mass index is 25.12 kg/m .    Alert.  No apparent distress.  Chest clear.  Cardiac exam regular.  HEENT exam with mild posterior oropharyngeal erythema without tonsillar exudates or hypertrophy.  Oropharynx moist mucous membranes.  Neck with shotty cervical lymphadenopathy.  Chest clear.  Cardiac exam regular without tachycardia.      This note has been dictated using voice recognition software and as a result may " contain minor grammatical errors and unintended word substitutions.       Answers submitted by the patient for this visit:  Patient Health Questionnaire (Submitted on 8/10/2023)  If you checked off any problems, how difficult have these problems made it for you to do your work, take care of things at home, or get along with other people?: Very difficult  PHQ9 TOTAL SCORE: 12  General Questionnaire (Submitted on 8/10/2023)  Chief Complaint: Chronic problems general questions HPI Form  How many servings of fruits and vegetables do you eat daily?: 2-3  On average, how many sweetened beverages do you drink each day (Examples: soda, juice, sweet tea, etc.  Do NOT count diet or artificially sweetened beverages)?: 0  How many minutes a day do you exercise enough to make your heart beat faster?: 9 or less  How many days a week do you exercise enough to make your heart beat faster?: 3 or less  How many days per week do you miss taking your medication?: 0  General Concern (Submitted on 8/10/2023)  Chief Complaint: Chronic problems general questions HPI Form  What is the reason for your visit today?: Strep throat testing  When did your symptoms begin?: 1-3 days ago  What are your symptoms?: Swollen glands and sore throat  How would you describe these symptoms?: Moderate  Are your symptoms:: Staying the same  Have you had these symptoms before?: Yes  Have you tried or received treatment for these symptoms before?: No  Is there anything that makes you feel worse?: No  Is there anything that makes you feel better?: No

## 2023-08-11 LAB — SARS-COV-2 RNA RESP QL NAA+PROBE: NEGATIVE

## 2023-08-29 ENCOUNTER — PATIENT OUTREACH (OUTPATIENT)
Dept: CARE COORDINATION | Facility: CLINIC | Age: 38
End: 2023-08-29
Payer: COMMERCIAL

## 2023-09-12 ENCOUNTER — PATIENT OUTREACH (OUTPATIENT)
Dept: CARE COORDINATION | Facility: CLINIC | Age: 38
End: 2023-09-12
Payer: COMMERCIAL

## 2023-09-13 ENCOUNTER — TRANSFERRED RECORDS (OUTPATIENT)
Dept: HEALTH INFORMATION MANAGEMENT | Facility: CLINIC | Age: 38
End: 2023-09-13
Payer: COMMERCIAL

## 2023-09-13 LAB
ALT SERPL-CCNC: 14 IU/L (ref 5–35)
AST SERPL-CCNC: 17 U/L (ref 5–34)
CREATININE (EXTERNAL): 0.91 MG/DL (ref 0.5–1.3)
GFR ESTIMATED (EXTERNAL): 74.8 ML/MIN/1.73M2

## 2023-09-19 DIAGNOSIS — F33.0 MAJOR DEPRESSIVE DISORDER, RECURRENT EPISODE, MILD (H): ICD-10-CM

## 2023-09-19 RX ORDER — CLONAZEPAM 0.5 MG/1
TABLET, ORALLY DISINTEGRATING ORAL
Qty: 20 TABLET | Refills: 0 | Status: SHIPPED | OUTPATIENT
Start: 2023-09-19 | End: 2024-08-09

## 2023-11-01 ENCOUNTER — OFFICE VISIT (OUTPATIENT)
Dept: FAMILY MEDICINE | Facility: CLINIC | Age: 38
End: 2023-11-01
Payer: COMMERCIAL

## 2023-11-01 VITALS
HEIGHT: 67 IN | OXYGEN SATURATION: 99 % | TEMPERATURE: 97.9 F | HEART RATE: 85 BPM | SYSTOLIC BLOOD PRESSURE: 129 MMHG | RESPIRATION RATE: 16 BRPM | BODY MASS INDEX: 23.07 KG/M2 | DIASTOLIC BLOOD PRESSURE: 86 MMHG | WEIGHT: 147 LBS

## 2023-11-01 DIAGNOSIS — L40.50 PSORIATIC ARTHRITIS (H): ICD-10-CM

## 2023-11-01 DIAGNOSIS — F32.81 PMDD (PREMENSTRUAL DYSPHORIC DISORDER): ICD-10-CM

## 2023-11-01 DIAGNOSIS — F33.0 MAJOR DEPRESSIVE DISORDER, RECURRENT EPISODE, MILD (H): Primary | ICD-10-CM

## 2023-11-01 DIAGNOSIS — D84.9 IMMUNOSUPPRESSED STATUS (H): ICD-10-CM

## 2023-11-01 DIAGNOSIS — E88.819 INSULIN RESISTANCE: ICD-10-CM

## 2023-11-01 DIAGNOSIS — Z30.09 GENERAL COUNSELING FOR PRESCRIPTION OF ORAL CONTRACEPTIVES: ICD-10-CM

## 2023-11-01 DIAGNOSIS — E66.3 OVERWEIGHT (BMI 25.0-29.9): ICD-10-CM

## 2023-11-01 DIAGNOSIS — Z86.39 HISTORY OF OBESITY: ICD-10-CM

## 2023-11-01 DIAGNOSIS — F50.819 BINGE EATING DISORDER: ICD-10-CM

## 2023-11-01 DIAGNOSIS — Z79.631 METHOTREXATE, LONG TERM, CURRENT USE: ICD-10-CM

## 2023-11-01 PROCEDURE — 99214 OFFICE O/P EST MOD 30 MIN: CPT | Performed by: FAMILY MEDICINE

## 2023-11-01 RX ORDER — QUETIAPINE FUMARATE 25 MG/1
50 TABLET, FILM COATED ORAL AT BEDTIME
COMMUNITY
Start: 2023-10-24

## 2023-11-01 RX ORDER — PHENTERMINE HYDROCHLORIDE 30 MG/1
30 CAPSULE ORAL EVERY MORNING
Qty: 90 CAPSULE | Refills: 0 | Status: SHIPPED | OUTPATIENT
Start: 2023-11-01 | End: 2024-03-20

## 2023-11-01 RX ORDER — NORETHINDRONE ACETATE AND ETHINYL ESTRADIOL .03; 1.5 MG/1; MG/1
1 TABLET ORAL DAILY
Qty: 84 TABLET | Refills: 3 | Status: SHIPPED | OUTPATIENT
Start: 2023-11-01

## 2023-11-01 ASSESSMENT — ANXIETY QUESTIONNAIRES
1. FEELING NERVOUS, ANXIOUS, OR ON EDGE: NEARLY EVERY DAY
7. FEELING AFRAID AS IF SOMETHING AWFUL MIGHT HAPPEN: SEVERAL DAYS
7. FEELING AFRAID AS IF SOMETHING AWFUL MIGHT HAPPEN: SEVERAL DAYS
6. BECOMING EASILY ANNOYED OR IRRITABLE: MORE THAN HALF THE DAYS
4. TROUBLE RELAXING: MORE THAN HALF THE DAYS
3. WORRYING TOO MUCH ABOUT DIFFERENT THINGS: MORE THAN HALF THE DAYS
8. IF YOU CHECKED OFF ANY PROBLEMS, HOW DIFFICULT HAVE THESE MADE IT FOR YOU TO DO YOUR WORK, TAKE CARE OF THINGS AT HOME, OR GET ALONG WITH OTHER PEOPLE?: VERY DIFFICULT
GAD7 TOTAL SCORE: 15
5. BEING SO RESTLESS THAT IT IS HARD TO SIT STILL: MORE THAN HALF THE DAYS
2. NOT BEING ABLE TO STOP OR CONTROL WORRYING: NEARLY EVERY DAY
GAD7 TOTAL SCORE: 15
GAD7 TOTAL SCORE: 15
IF YOU CHECKED OFF ANY PROBLEMS ON THIS QUESTIONNAIRE, HOW DIFFICULT HAVE THESE PROBLEMS MADE IT FOR YOU TO DO YOUR WORK, TAKE CARE OF THINGS AT HOME, OR GET ALONG WITH OTHER PEOPLE: VERY DIFFICULT

## 2023-11-01 ASSESSMENT — PATIENT HEALTH QUESTIONNAIRE - PHQ9
10. IF YOU CHECKED OFF ANY PROBLEMS, HOW DIFFICULT HAVE THESE PROBLEMS MADE IT FOR YOU TO DO YOUR WORK, TAKE CARE OF THINGS AT HOME, OR GET ALONG WITH OTHER PEOPLE: VERY DIFFICULT
SUM OF ALL RESPONSES TO PHQ QUESTIONS 1-9: 16
SUM OF ALL RESPONSES TO PHQ QUESTIONS 1-9: 16

## 2023-11-01 NOTE — ASSESSMENT & PLAN NOTE
Refill of OCP sent to pharmacy.  Symptoms greatly improved on the current product.  She is a non-smoker.  Discussed risks and benefits of hormonal therapies.

## 2023-11-01 NOTE — ASSESSMENT & PLAN NOTE
Symptoms stable.  Rheumatology recommending RSV vaccine and shingles vaccine.  We attempted to give her the RSV vaccine but the computer system was unable to approve it.  She is immunosuppressed.  The computer system would only accept indications including age of 60 and older and pregnancy.  We will check to see if we can offer to her.  For now there is a standing order.

## 2023-11-01 NOTE — ASSESSMENT & PLAN NOTE
Now working with psychiatry.   Stress is high.  Defer to mental health provider.  No changes to medication.  Phentermine is not exacerbating her symptoms.  Seroquel will potentially be a barrier to maintenance of weight loss.   Picato Counseling:  I discussed with the patient the risks of Picato including but not limited to erythema, scaling, itching, weeping, crusting, and pain.

## 2023-11-01 NOTE — PROGRESS NOTES
Assessment & Plan   Problem List Items Addressed This Visit       Binge eating disorder     She continues to struggle with symptoms of binging.  Consider trial of Vyvanse (not previously tried).         Relevant Medications    phentermine (LOMAIRA) 8 MG tablet    phentermine (ADIPEX-P) 30 MG capsule    BMI 23.0-23.9, adult     37 year old year old female in clinic today to discuss treatment of the following conditions through diet and lifestyle modification and weight loss:  1. Major depressive disorder, recurrent episode, mild (H24)    2. History of obesity    3. General counseling for prescription of oral contraceptives    4. Overweight (BMI 25.0-29.9)    5. Insulin resistance    6. Binge eating disorder    7. Psoriatic arthritis (H)    8. Immunosuppressed status (H24)    9. Methotrexate, long term, current use    The patient's weight loss result since the last visit was mixed based on weight loss.  Her BMI has now normalized.  There are number of factors that are relevant for weight loss.  She is been using phentermine as needed for cravings.  Appetite is greatly increased as she has been on Seroquel for the past couple of weeks.  Multiple professional and social stressors have made focus on healthy living challenge since I last saw her.  Given her entire story, I think it is reasonable to continue anorexigenic therapy such as phentermine.  I suspect the 8 mg phentermine tablet dosing taken as needed for cravings would work well for her and might decrease the overall burden of medicines.  She could take this in addition to or in place of the 30 mg capsules.  Defer blood test today.         Relevant Medications    phentermine (LOMAIRA) 8 MG tablet    phentermine (ADIPEX-P) 30 MG capsule    History of obesity    Relevant Medications    phentermine (LOMAIRA) 8 MG tablet    Insulin resistance    Relevant Medications    phentermine (LOMAIRA) 8 MG tablet    phentermine (ADIPEX-P) 30 MG capsule    Major depressive  disorder, recurrent episode, mild (H24) - Primary     Now working with psychiatry.   Stress is high.  Defer to mental health provider.  No changes to medication.  Phentermine is not exacerbating her symptoms.  Seroquel will potentially be a barrier to maintenance of weight loss.         PMDD (premenstrual dysphoric disorder)     Refill of OCP sent to pharmacy.  Symptoms greatly improved on the current product.  She is a non-smoker.  Discussed risks and benefits of hormonal therapies.         Psoriatic arthritis (H)     Symptoms stable.  Rheumatology recommending RSV vaccine and shingles vaccine.  We attempted to give her the RSV vaccine but the computer system was unable to approve it.  She is immunosuppressed.  The computer system would only accept indications including age of 60 and older and pregnancy.  We will check to see if we can offer to her.  For now there is a standing order.         Relevant Orders    RSV VACCINE (ABRYSVO)    ZOSTER RECOMBINANT ADJUVANTED (SHINGRIX)     Other Visit Diagnoses       General counseling for prescription of oral contraceptives        Relevant Medications    norethindrone-ethinyl estradiol (MICROGESTIN 1.5/30) 1.5-30 MG-MCG tablet    Overweight (BMI 25.0-29.9)        Relevant Medications    phentermine (LOMAIRA) 8 MG tablet    phentermine (ADIPEX-P) 30 MG capsule    Immunosuppressed status (H24)        Relevant Orders    RSV VACCINE (ABRYSVO)    ZOSTER RECOMBINANT ADJUVANTED (SHINGRIX)    Methotrexate, long term, current use        Relevant Orders    RSV VACCINE (ABRYSVO)    ZOSTER RECOMBINANT ADJUVANTED (SHINGRIX)           Rob Cooper MD  [facility]    Leslee Rasheed is a 37 year old, presenting for the following health issues:  Weight Loss (Follow-up/)        11/1/2023    10:07 AM   Additional Questions   Roomed by shola     Anxiety:   - she is seeing pyschiatry.  Seroquel is new in the past couple of weeks.  She expresses nervousness about  "weight gain.  \"Super hungry.\"     - life continues to be challenging (divorce, childcare with adoption of niece)   - PMDD.  OCP has been helping.  Non-smoker.  No associated weight gain.     Weight: phentermine as needed. No affect on mood.     History of Present Illness       Mental Health Follow-up:  Patient presents to follow-up on Depression & Anxiety.Patient's depression since last visit has been:  Worse  The patient is not having other symptoms associated with depression.  Patient's anxiety since last visit has been:  Worse  The patient is having other symptoms associated with anxiety.  Any significant life events: relationship concerns, financial concerns and other  Patient is feeling anxious or having panic attacks.  Patient has no concerns about alcohol or drug use.    Reason for visit:  Weight loss management and mental health    She eats 2-3 servings of fruits and vegetables daily.She consumes 0 sweetened beverage(s) daily.She exercises with enough effort to increase her heart rate 20 to 29 minutes per day.  She exercises with enough effort to increase her heart rate 3 or less days per week. She is missing 1 dose(s) of medications per week.      Review of Systems       Objective    /86 (BP Location: Left arm, Patient Position: Sitting, Cuff Size: Adult Regular)   Pulse 85   Temp 97.9  F (36.6  C) (Oral)   Resp 16   Ht 1.689 m (5' 6.5\")   Wt 66.7 kg (147 lb)   LMP  (LMP Unknown)   SpO2 99%   BMI 23.37 kg/m    Body mass index is 23.37 kg/m .  Physical Exam  Nursing note reviewed.   Constitutional:       General: She is not in acute distress.     Appearance: Normal appearance. She is not ill-appearing.   HENT:      Head: Normocephalic and atraumatic.   Eyes:      Extraocular Movements: Extraocular movements intact.      Conjunctiva/sclera: Conjunctivae normal.   Pulmonary:      Effort: Pulmonary effort is normal.   Neurological:      Mental Status: She is alert and oriented to person, place, " and time.   Psychiatric:         Attention and Perception: Attention normal.         Mood and Affect: Mood normal.         Speech: Speech normal.         Thought Content: Thought content normal.

## 2023-11-01 NOTE — ASSESSMENT & PLAN NOTE
She continues to struggle with symptoms of binging.  Consider trial of Vyvanse (not previously tried).

## 2023-11-01 NOTE — ASSESSMENT & PLAN NOTE
37 year old year old female in clinic today to discuss treatment of the following conditions through diet and lifestyle modification and weight loss:  1. Major depressive disorder, recurrent episode, mild (H24)    2. History of obesity    3. General counseling for prescription of oral contraceptives    4. Overweight (BMI 25.0-29.9)    5. Insulin resistance    6. Binge eating disorder    7. Psoriatic arthritis (H)    8. Immunosuppressed status (H24)    9. Methotrexate, long term, current use      The patient's weight loss result since the last visit was mixed based on weight loss.  Her BMI has now normalized.  There are number of factors that are relevant for weight loss.  She is been using phentermine as needed for cravings.  Appetite is greatly increased as she has been on Seroquel for the past couple of weeks.  Multiple professional and social stressors have made focus on healthy living challenge since I last saw her.  Given her entire story, I think it is reasonable to continue anorexigenic therapy such as phentermine.  I suspect the 8 mg phentermine tablet dosing taken as needed for cravings would work well for her and might decrease the overall burden of medicines.  She could take this in addition to or in place of the 30 mg capsules.  Defer blood test today.

## 2023-12-16 ENCOUNTER — HEALTH MAINTENANCE LETTER (OUTPATIENT)
Age: 38
End: 2023-12-16

## 2024-02-01 ENCOUNTER — OFFICE VISIT (OUTPATIENT)
Dept: URGENT CARE | Facility: URGENT CARE | Age: 39
End: 2024-02-01
Payer: COMMERCIAL

## 2024-02-01 VITALS
SYSTOLIC BLOOD PRESSURE: 113 MMHG | DIASTOLIC BLOOD PRESSURE: 76 MMHG | HEART RATE: 92 BPM | OXYGEN SATURATION: 97 % | TEMPERATURE: 97.3 F | RESPIRATION RATE: 16 BRPM

## 2024-02-01 DIAGNOSIS — N39.0 URINARY TRACT INFECTION WITHOUT HEMATURIA, SITE UNSPECIFIED: ICD-10-CM

## 2024-02-01 DIAGNOSIS — R07.0 THROAT PAIN: ICD-10-CM

## 2024-02-01 DIAGNOSIS — R10.9 FLANK PAIN: Primary | ICD-10-CM

## 2024-02-01 DIAGNOSIS — N76.0 VAGINITIS AND VULVOVAGINITIS: ICD-10-CM

## 2024-02-01 DIAGNOSIS — Z11.3 SCREEN FOR STD (SEXUALLY TRANSMITTED DISEASE): ICD-10-CM

## 2024-02-01 LAB
ALBUMIN UR-MCNC: 30 MG/DL
APPEARANCE UR: CLEAR
BACTERIA #/AREA URNS HPF: ABNORMAL /HPF
BILIRUB UR QL STRIP: NEGATIVE
CLUE CELLS: ABNORMAL
COLOR UR AUTO: YELLOW
DEPRECATED S PYO AG THROAT QL EIA: NEGATIVE
GLUCOSE UR STRIP-MCNC: NEGATIVE MG/DL
GROUP A STREP BY PCR: NOT DETECTED
HGB UR QL STRIP: ABNORMAL
KETONES UR STRIP-MCNC: NEGATIVE MG/DL
LEUKOCYTE ESTERASE UR QL STRIP: ABNORMAL
NITRATE UR QL: NEGATIVE
PH UR STRIP: 7 [PH] (ref 5–7)
RBC #/AREA URNS AUTO: ABNORMAL /HPF
SP GR UR STRIP: 1.01 (ref 1–1.03)
SQUAMOUS #/AREA URNS AUTO: ABNORMAL /LPF
TRICHOMONAS, WET PREP: ABNORMAL
UROBILINOGEN UR STRIP-ACNC: 0.2 E.U./DL
WBC #/AREA URNS AUTO: ABNORMAL /HPF
WBC'S/HIGH POWER FIELD, WET PREP: ABNORMAL
YEAST, WET PREP: ABNORMAL

## 2024-02-01 PROCEDURE — 87186 SC STD MICRODIL/AGAR DIL: CPT | Performed by: FAMILY MEDICINE

## 2024-02-01 PROCEDURE — 87651 STREP A DNA AMP PROBE: CPT | Performed by: FAMILY MEDICINE

## 2024-02-01 PROCEDURE — 87591 N.GONORRHOEAE DNA AMP PROB: CPT | Performed by: FAMILY MEDICINE

## 2024-02-01 PROCEDURE — 87210 SMEAR WET MOUNT SALINE/INK: CPT | Performed by: FAMILY MEDICINE

## 2024-02-01 PROCEDURE — 81001 URINALYSIS AUTO W/SCOPE: CPT | Performed by: FAMILY MEDICINE

## 2024-02-01 PROCEDURE — 87088 URINE BACTERIA CULTURE: CPT | Performed by: FAMILY MEDICINE

## 2024-02-01 PROCEDURE — 87491 CHLMYD TRACH DNA AMP PROBE: CPT | Performed by: FAMILY MEDICINE

## 2024-02-01 PROCEDURE — 87086 URINE CULTURE/COLONY COUNT: CPT | Performed by: FAMILY MEDICINE

## 2024-02-01 PROCEDURE — 99214 OFFICE O/P EST MOD 30 MIN: CPT | Performed by: FAMILY MEDICINE

## 2024-02-01 RX ORDER — PHENAZOPYRIDINE HYDROCHLORIDE 200 MG/1
200 TABLET, FILM COATED ORAL 3 TIMES DAILY PRN
Qty: 6 TABLET | Refills: 0 | Status: SHIPPED | OUTPATIENT
Start: 2024-02-01

## 2024-02-01 RX ORDER — FLUCONAZOLE 150 MG/1
150 TABLET ORAL WEEKLY
Qty: 2 TABLET | Refills: 0 | Status: SHIPPED | OUTPATIENT
Start: 2024-02-01 | End: 2024-02-09

## 2024-02-01 RX ORDER — CEFDINIR 300 MG/1
300 CAPSULE ORAL 2 TIMES DAILY
Qty: 14 CAPSULE | Refills: 0 | Status: SHIPPED | OUTPATIENT
Start: 2024-02-01 | End: 2024-02-08

## 2024-02-01 NOTE — PROGRESS NOTES
"SUBJECTIVE:  Chief Complaint   Patient presents with    Flank Pain     4 days, right, urgency, frequency, bladder pressure/pain    Pharyngitis     5 days, exposed to strep     Kathya Chino is a 38 year old female who presents with a chief complaint of right sided flank pain, urgency, frequency for 4 days.  Also complain of sore throat X 5 days, exposed to strep.    Has not had recurrent UTI, did have a UTI infection couple of times this past year.  Endorsed dysuria symptoms and then developed back pain for couple of days, worsening more on right side.    Wondering about kidney stone but did resolve on its own before, had back pain at that time and was different than current symptoms.    No concerns for STD, does have new partner and would be okay to screen    Son has strep, patient endorsed sore throat for the past 5 days.    Past Medical History:   Diagnosis Date    Anxiety     Benign paroxysmal positional vertigo, right 5/5/2023    Depressive disorder 2005    Dysmenorrhea     Endometriosis     Joint pain     Mental disorder     anxiety    Migraine     Psoriatic arthritis (H)     Stillbirth of single fetus     32wks, breech     Current Outpatient Medications   Medication Sig Dispense Refill    BD SAFETYGLIDE SYRINGE/NEEDLE 27G X 5/8\" 1 ML MISC TO BE USED WITH INJECTABLE METHOTREXATE      buPROPion (WELLBUTRIN XL) 150 MG 24 hr tablet TAKE 1 TABLET(150 MG) BY MOUTH EVERY MORNING 90 tablet 1    buPROPion (WELLBUTRIN XL) 300 MG 24 hr tablet Take 1 tablet (300 mg) by mouth every morning (total dose: 150 mg + 300 mg = 450 mg) 90 tablet 3    calcium carbonate 500 mg, elemental, (OSCAL 500) 1250 (500 Ca) MG TABS tablet Take 2 tablets by mouth daily      clonazePAM (KLONOPIN) 0.5 MG ODT DISSOLVE 1 TABLET(0.5 MG) ON THE TONGUE EVERY NIGHT AS NEEDED FOR ANXIETY 20 tablet 0    escitalopram (LEXAPRO) 20 MG tablet Take 1 tablet (20 mg) by mouth daily 90 tablet 3    folic acid (FOLVITE) 1 MG tablet TAKE 2 TABLETS BY MOUTH " EVERY MORNING      HUMIRA *CF* PEN 40 MG/0.4ML pen kit       meclizine (ANTIVERT) 25 MG tablet Take 1 tablet (25 mg) by mouth 3 times daily as needed for dizziness  0    methotrexate 50 MG/2ML injection INJECT 0.7 ML SUBCUTANEOUSLY ONCE WEEKLY      norethindrone-ethinyl estradiol (MICROGESTIN 1.5/30) 1.5-30 MG-MCG tablet Take 1 tablet by mouth daily 84 tablet 3    ondansetron (ZOFRAN ODT) 4 MG ODT tab Take 1 tablet (4 mg) by mouth every 8 hours as needed for nausea 15 tablet 0    phentermine (ADIPEX-P) 30 MG capsule Take 1 capsule (30 mg) by mouth every morning 90 capsule 0    phentermine (LOMAIRA) 8 MG tablet Take 1 tablet (8 mg) by mouth every 8 hours as needed (excess hunger) 30 tablet 0    predniSONE (DELTASONE) 5 MG tablet as needed      QUEtiapine (SEROQUEL) 25 MG tablet Take 50 mg by mouth at bedtime      Vitamin D3 (CHOLECALCIFEROL) 125 MCG (5000 UT) tablet Take 1 tablet by mouth daily       Social History     Tobacco Use    Smoking status: Never     Passive exposure: Past    Smokeless tobacco: Never   Substance Use Topics    Alcohol use: No       ROS:  Review of systems negative except as stated above.    EXAM:   /76   Pulse 92   Temp 97.3  F (36.3  C)   Resp 16   SpO2 97%   GENERAL APPEARANCE: healthy, alert and no distress  MOUTH: pharynx mild pink, no exudates, uvula midline  PSYCH:alert, affect bright    Results for orders placed or performed in visit on 02/01/24   UA Macroscopic with reflex to Microscopic and Culture - Clinic Collect     Status: Abnormal    Specimen: Urine, Clean Catch   Result Value Ref Range    Color Urine Yellow Colorless, Straw, Light Yellow, Yellow    Appearance Urine Clear Clear    Glucose Urine Negative Negative mg/dL    Bilirubin Urine Negative Negative    Ketones Urine Negative Negative mg/dL    Specific Gravity Urine 1.010 1.003 - 1.035    Blood Urine Moderate (A) Negative    pH Urine 7.0 5.0 - 7.0    Protein Albumin Urine 30 (A) Negative mg/dL    Urobilinogen Urine  0.2 0.2, 1.0 E.U./dL    Nitrite Urine Negative Negative    Leukocyte Esterase Urine Moderate (A) Negative   Urine Microscopic Exam     Status: Abnormal   Result Value Ref Range    Bacteria Urine None Seen None Seen /HPF    RBC Urine 10-25 (A) 0-2 /HPF /HPF    WBC Urine 25-50 (A) 0-5 /HPF /HPF    Squamous Epithelials Urine Few (A) None Seen /LPF   Wet prep - Clinic Collect     Status: Abnormal    Specimen: Vagina; Swab   Result Value Ref Range    Trichomonas Absent Absent    Yeast Absent Absent    Clue Cells Absent Absent    WBCs/high power field 2+ (A) None   Streptococcus A Rapid Screen w/Reflex to PCR - Clinic Collect     Status: Normal    Specimen: Throat; Swab   Result Value Ref Range    Group A Strep antigen Negative Negative       ASSESSMENT/PLAN:  (R10.9) Flank pain  (primary encounter diagnosis)  Comment: right sided  Plan: UA Macroscopic with reflex to Microscopic and         Culture - Clinic Collect, Wet prep - Clinic         Collect, Urine Microscopic Exam, Urine Culture,        cefdinir (OMNICEF) 300 MG capsule            (R07.0) Throat pain  Plan: Streptococcus A Rapid Screen w/Reflex to PCR -         Clinic Collect, Group A Streptococcus PCR         Throat Swab            (N39.0) Urinary tract infection without hematuria, site unspecified  Plan: cefdinir (OMNICEF) 300 MG capsule,         phenazopyridine (PYRIDIUM) 200 MG tablet            (N76.0) Vaginitis and vulvovaginitis  Comment: post antibiotic use  Plan: fluconazole (DIFLUCAN) 150 MG tablet            (Z11.3) Screen for STD (sexually transmitted disease)  Plan: Chlamydia trachomatis/Neisseria gonorrhoeae by         PCR - Clinic Collect            Empiric treatment for UTI/early pyelonephritis with RX Omnicef given.  Will follow up on urine culture and adjust medication if needed.  RX pyridium given to help with dysuria symptoms.  Encourage to drink plenty of fluids.  RX diflucan given to take for post antibiotic yeast infection.  STD screen  obtained and treat as appropriate.    Reviewed sore throat and possible viral etiology.  Will follow up on throat culture and notify if positive, reviewed that Omnicef already treats for strep throat.    Follow up with primary provider if no improvement of symptoms in 1 week    Les Zhang MD  February 1, 2024 2:04 PM

## 2024-02-02 LAB
BACTERIA UR CULT: ABNORMAL
C TRACH DNA SPEC QL PROBE+SIG AMP: NEGATIVE
N GONORRHOEA DNA SPEC QL NAA+PROBE: NEGATIVE

## 2024-02-08 ENCOUNTER — MYC MEDICAL ADVICE (OUTPATIENT)
Dept: FAMILY MEDICINE | Facility: CLINIC | Age: 39
End: 2024-02-08
Payer: COMMERCIAL

## 2024-02-08 DIAGNOSIS — F50.819 BINGE EATING DISORDER: Primary | ICD-10-CM

## 2024-02-08 NOTE — TELEPHONE ENCOUNTER
OV 11/1/24 noted:     Binge eating disorder       She continues to struggle with symptoms of binging.  Consider trial of Vyvanse (not previously tried)

## 2024-02-10 RX ORDER — LISDEXAMFETAMINE DIMESYLATE 30 MG/1
30 CAPSULE ORAL EVERY MORNING
Qty: 15 CAPSULE | Refills: 0 | Status: SHIPPED | OUTPATIENT
Start: 2024-02-10 | End: 2024-03-20

## 2024-02-17 ENCOUNTER — OFFICE VISIT (OUTPATIENT)
Dept: FAMILY MEDICINE | Facility: CLINIC | Age: 39
End: 2024-02-17
Payer: COMMERCIAL

## 2024-02-17 VITALS
SYSTOLIC BLOOD PRESSURE: 110 MMHG | OXYGEN SATURATION: 97 % | TEMPERATURE: 98.7 F | WEIGHT: 147 LBS | DIASTOLIC BLOOD PRESSURE: 78 MMHG | BODY MASS INDEX: 23.37 KG/M2 | RESPIRATION RATE: 20 BRPM | HEART RATE: 80 BPM

## 2024-02-17 DIAGNOSIS — N30.00 ACUTE CYSTITIS WITHOUT HEMATURIA: Primary | ICD-10-CM

## 2024-02-17 DIAGNOSIS — R39.89 URINARY PROBLEM: ICD-10-CM

## 2024-02-17 LAB
ALBUMIN UR-MCNC: NEGATIVE MG/DL
APPEARANCE UR: CLEAR
BACTERIA #/AREA URNS HPF: ABNORMAL /HPF
BILIRUB UR QL STRIP: NEGATIVE
COLOR UR AUTO: YELLOW
GLUCOSE UR STRIP-MCNC: NEGATIVE MG/DL
HGB UR QL STRIP: ABNORMAL
KETONES UR STRIP-MCNC: NEGATIVE MG/DL
LEUKOCYTE ESTERASE UR QL STRIP: ABNORMAL
NITRATE UR QL: NEGATIVE
PH UR STRIP: 7.5 [PH] (ref 5–8)
RBC #/AREA URNS AUTO: ABNORMAL /HPF
SP GR UR STRIP: 1.01 (ref 1–1.03)
SQUAMOUS #/AREA URNS AUTO: ABNORMAL /LPF
UROBILINOGEN UR STRIP-ACNC: 0.2 E.U./DL
WBC #/AREA URNS AUTO: ABNORMAL /HPF
WBC CLUMPS #/AREA URNS HPF: PRESENT /HPF

## 2024-02-17 PROCEDURE — 87088 URINE BACTERIA CULTURE: CPT | Performed by: PHYSICIAN ASSISTANT

## 2024-02-17 PROCEDURE — 99214 OFFICE O/P EST MOD 30 MIN: CPT | Performed by: PHYSICIAN ASSISTANT

## 2024-02-17 PROCEDURE — 87086 URINE CULTURE/COLONY COUNT: CPT | Performed by: PHYSICIAN ASSISTANT

## 2024-02-17 PROCEDURE — 87186 SC STD MICRODIL/AGAR DIL: CPT | Performed by: PHYSICIAN ASSISTANT

## 2024-02-17 PROCEDURE — 81001 URINALYSIS AUTO W/SCOPE: CPT | Performed by: PHYSICIAN ASSISTANT

## 2024-02-17 RX ORDER — CEFDINIR 300 MG/1
300 CAPSULE ORAL 2 TIMES DAILY
Qty: 20 CAPSULE | Refills: 0 | Status: SHIPPED | OUTPATIENT
Start: 2024-02-17 | End: 2024-02-27

## 2024-02-17 NOTE — PATIENT INSTRUCTIONS
Increased fluids and rest.  Discussed signs and symptoms of ascending urinary tract infection symptoms to include pyelonephritis.  Antibiotic as written. Complete antibiotic regimen as prescribed.   You will be notified if the treatment plan needs to be changed based on the urine culture results.     Follow up with primary care provider if you do not get resolution with the course of treatment or if symptoms present as follow:  have a fever of 100.4 F (38 C) or higher, no improvement after three days of treatment, trouble urinating because of pain,new or increased discharge, rash or joint pain, Increased back or abdominal pain.    Return to walk-in care if complication or new symptoms arise in the interim.

## 2024-02-17 NOTE — PROGRESS NOTES
Patient presents with:  Urinary Problem: Patient had a UTI and just finished medication 1 week ago and has now started with symptoms again. Patient states she has right kidney pain pressure and pain in bladder area and feels she is not completely emptying.      Clinical Decision Making:  Multiple etiologies and diagnoses were considered to include but not limited to urinary tract infection, dysuria, hyperglycemia.  Patient is treated for a long course of treatment based on her age and other inclusion criteria.  Patient is treated with antibiotic and creatinine clearance and allergies were reviewed and expected course of resolution and indication for return was gone over.        ICD-10-CM    1. Acute cystitis without hematuria  N30.00 cefdinir (OMNICEF) 300 MG capsule      2. Urinary problem  R39.89 UA Macroscopic with reflex to Microscopic and Culture - Clinic Collect     Urine Microscopic Exam     Urine Culture          Patient Instructions   Increased fluids and rest.  Discussed signs and symptoms of ascending urinary tract infection symptoms to include pyelonephritis.  Antibiotic as written. Complete antibiotic regimen as prescribed.   You will be notified if the treatment plan needs to be changed based on the urine culture results.     Follow up with primary care provider if you do not get resolution with the course of treatment or if symptoms present as follow:  have a fever of 100.4 F (38 C) or higher, no improvement after three days of treatment, trouble urinating because of pain,new or increased discharge, rash or joint pain, Increased back or abdominal pain.    Return to walk-in care if complication or new symptoms arise in the interim.     HPI:  Kathya Chino is a 38 year old female who presents today for a two day history of irritative voiding symptoms to include urinary hesitancy urgency frequency and dysuria and mild tenderness on the right flank.  Patient denies gross hematuria.  Denies fever chills  night sweats fatigue or other red flag symptoms to include back pain and no vaginal discharge.  Patient was recently diagnosed with a urinary tract infection on 2/1/2024 and had screening exam for urinalysis, vaginitis panel and STD panel.  Patient was treated with cefdinir for 7 days.  After completion of that course of antibiotic she had return of symptoms over the last 2 days.  Patient states she does not have vaginal discharge or concern for STDs at this time.    History obtained from chart review and the patient.    Problem List:  2023-11: History of obesity  2023-11: PMDD (premenstrual dysphoric disorder)  2023-05: Benign paroxysmal positional vertigo, right  2022-09: Binge eating disorder  2022-03: Telogen effluvium  2022-01: Family history of sudden cardiac death in brother  2022-01: Family history of sudden cardiac death in father  2021-08: Major depressive disorder, recurrent episode, mild (H24)  2021-08: Endometriosis  2021-08: Pain in joint, multiple sites  2021-07: Insulin resistance  2017-09: Psoriatic arthritis (H)  2017-09: Migraine with aura and without status migrainosus, not   intractable  2017-09: BMI 23.0-23.9, adult  2017-03: CARDIOVASCULAR SCREENING; LDL GOAL LESS THAN 160  2016-09: Pregnant      Past Medical History:   Diagnosis Date    Anxiety     Benign paroxysmal positional vertigo, right 5/5/2023    Depressive disorder 2005    Dysmenorrhea     Endometriosis     Joint pain     Mental disorder     anxiety    Migraine     Psoriatic arthritis (H)     Stillbirth of single fetus     32wks, breech       Social History     Tobacco Use    Smoking status: Never     Passive exposure: Past    Smokeless tobacco: Never   Substance Use Topics    Alcohol use: No       Review of Systems  As above in HPI otherwise negative.    Vitals:    02/17/24 1442   BP: 110/78   Pulse: 80   Resp: 20   Temp: 98.7  F (37.1  C)   SpO2: 97%   Weight: 66.7 kg (147 lb)       General: Patient is resting comfortably no acute  distress is afebrile  HEENT: Head is normocephalic atraumatic   eyes are PERRL EOMI sclera anicteric   TMs are clear bilaterally  Throat is with mild pharyngeal wall erythema and no exudate  No cervical lymphadenopathy present  LUNGS: Clear to auscultation bilaterally  HEART: Regular rate and rhythm  Abdomen:  Nontender nondistended no rebound or guarding no masses mild CVA tenderness on the right side.  Skin: Without rash non-diaphoretic    Physical Exam      Labs:  Results for orders placed or performed in visit on 02/17/24   UA Macroscopic with reflex to Microscopic and Culture - Clinic Collect     Status: Abnormal    Specimen: Urine, Clean Catch   Result Value Ref Range    Color Urine Yellow Colorless, Straw, Light Yellow, Yellow    Appearance Urine Clear Clear    Glucose Urine Negative Negative mg/dL    Bilirubin Urine Negative Negative    Ketones Urine Negative Negative mg/dL    Specific Gravity Urine 1.010 1.005 - 1.030    Blood Urine Trace (A) Negative    pH Urine 7.5 5.0 - 8.0    Protein Albumin Urine Negative Negative mg/dL    Urobilinogen Urine 0.2 0.2, 1.0 E.U./dL    Nitrite Urine Negative Negative    Leukocyte Esterase Urine Large (A) Negative   Urine Microscopic Exam     Status: Abnormal   Result Value Ref Range    Bacteria Urine Moderate (A) None Seen /HPF    RBC Urine 0-2 0-2 /HPF /HPF    WBC Urine 10-25 (A) 0-5 /HPF /HPF    Squamous Epithelials Urine Few (A) None Seen /LPF    WBC Clumps Urine Present (A) None Seen /HPF     At the end of the encounter, I discussed results, diagnosis, medications. Discussed red flags for immediate return to clinic/ER, as well as indications for follow up if no improvement. Patient understood and agreed to plan. Patient was stable for discharge.

## 2024-02-18 LAB — BACTERIA UR CULT: ABNORMAL

## 2024-03-19 ASSESSMENT — ANXIETY QUESTIONNAIRES
GAD7 TOTAL SCORE: 8
1. FEELING NERVOUS, ANXIOUS, OR ON EDGE: SEVERAL DAYS
2. NOT BEING ABLE TO STOP OR CONTROL WORRYING: SEVERAL DAYS
4. TROUBLE RELAXING: SEVERAL DAYS
7. FEELING AFRAID AS IF SOMETHING AWFUL MIGHT HAPPEN: NOT AT ALL
5. BEING SO RESTLESS THAT IT IS HARD TO SIT STILL: MORE THAN HALF THE DAYS
GAD7 TOTAL SCORE: 8
7. FEELING AFRAID AS IF SOMETHING AWFUL MIGHT HAPPEN: NOT AT ALL
8. IF YOU CHECKED OFF ANY PROBLEMS, HOW DIFFICULT HAVE THESE MADE IT FOR YOU TO DO YOUR WORK, TAKE CARE OF THINGS AT HOME, OR GET ALONG WITH OTHER PEOPLE?: SOMEWHAT DIFFICULT
GAD7 TOTAL SCORE: 8
6. BECOMING EASILY ANNOYED OR IRRITABLE: SEVERAL DAYS
IF YOU CHECKED OFF ANY PROBLEMS ON THIS QUESTIONNAIRE, HOW DIFFICULT HAVE THESE PROBLEMS MADE IT FOR YOU TO DO YOUR WORK, TAKE CARE OF THINGS AT HOME, OR GET ALONG WITH OTHER PEOPLE: SOMEWHAT DIFFICULT
3. WORRYING TOO MUCH ABOUT DIFFERENT THINGS: MORE THAN HALF THE DAYS

## 2024-03-19 ASSESSMENT — PATIENT HEALTH QUESTIONNAIRE - PHQ9
SUM OF ALL RESPONSES TO PHQ QUESTIONS 1-9: 9
10. IF YOU CHECKED OFF ANY PROBLEMS, HOW DIFFICULT HAVE THESE PROBLEMS MADE IT FOR YOU TO DO YOUR WORK, TAKE CARE OF THINGS AT HOME, OR GET ALONG WITH OTHER PEOPLE: SOMEWHAT DIFFICULT
SUM OF ALL RESPONSES TO PHQ QUESTIONS 1-9: 9

## 2024-03-20 ENCOUNTER — VIRTUAL VISIT (OUTPATIENT)
Dept: FAMILY MEDICINE | Facility: CLINIC | Age: 39
End: 2024-03-20
Attending: FAMILY MEDICINE
Payer: COMMERCIAL

## 2024-03-20 DIAGNOSIS — F33.0 MAJOR DEPRESSIVE DISORDER, RECURRENT EPISODE, MILD (H): ICD-10-CM

## 2024-03-20 DIAGNOSIS — Z86.39 HISTORY OF OBESITY: Primary | ICD-10-CM

## 2024-03-20 DIAGNOSIS — F51.04 PSYCHOPHYSIOLOGICAL INSOMNIA: ICD-10-CM

## 2024-03-20 DIAGNOSIS — R41.840 POOR CONCENTRATION: ICD-10-CM

## 2024-03-20 DIAGNOSIS — N15.9 KIDNEY INFECTION: ICD-10-CM

## 2024-03-20 DIAGNOSIS — F50.819 BINGE EATING DISORDER: ICD-10-CM

## 2024-03-20 DIAGNOSIS — R30.0 DYSURIA: ICD-10-CM

## 2024-03-20 PROCEDURE — 99214 OFFICE O/P EST MOD 30 MIN: CPT | Mod: 95 | Performed by: FAMILY MEDICINE

## 2024-03-20 PROCEDURE — G2211 COMPLEX E/M VISIT ADD ON: HCPCS | Mod: 95 | Performed by: FAMILY MEDICINE

## 2024-03-20 RX ORDER — TRAZODONE HYDROCHLORIDE 50 MG/1
50-100 TABLET, FILM COATED ORAL AT BEDTIME
Qty: 60 TABLET | Refills: 0 | Status: SHIPPED | OUTPATIENT
Start: 2024-03-20

## 2024-03-20 RX ORDER — LISDEXAMFETAMINE DIMESYLATE 50 MG/1
50 CAPSULE ORAL EVERY MORNING
Qty: 30 CAPSULE | Refills: 0 | Status: SHIPPED | OUTPATIENT
Start: 2024-03-20 | End: 2024-03-27

## 2024-03-20 RX ORDER — LEVOFLOXACIN 750 MG/1
750 TABLET, FILM COATED ORAL DAILY
Qty: 7 TABLET | Refills: 0 | Status: SHIPPED | OUTPATIENT
Start: 2024-03-20 | End: 2024-07-16

## 2024-03-20 NOTE — ASSESSMENT & PLAN NOTE
38 year old year old female in clinic today to discuss treatment of the following conditions through diet and lifestyle modification and weight loss:  1. History of obesity    2. Binge eating disorder    3. BMI 23.0-23.9, adult    4. Major depressive disorder, recurrent episode, mild (H24)    5. Psychophysiological insomnia    6. Poor concentration    7. Kidney infection    8. Dysuria      The patient's weight loss result since the last visit was mixed based on success.  Weight has fluctuated somewhat.  Stress has been incredibly high.  The trial of 30 mg dosing of Vyvanse was quite expensive and she paid cash.  We discussed that there is generic Vyvanse and that it would be probably less expensive at alternative pharmacies.  I prescribed 50 mg lisdexamfetamine.  Follow-up recommended 3 months.  Will increase dose based on feedback regarding binging behaviors.

## 2024-03-20 NOTE — ASSESSMENT & PLAN NOTE
In therapy.  Stress remains high.  Continue escitalopram.  She did have a psychiatrist who started Seroquel.  The patient is concerned that Seroquel might cause iatrogenic weight gain.  She is correct to have this concern.  We discussed trying to take the lowest effective dose.  We also will see if she gets a similar benefit in sleep initiation with trazodone.  Discussed potential side effects.  Trazodone prescribed.  Follow-up if not improving.

## 2024-03-20 NOTE — PROGRESS NOTES
Kathya is a 38 year old who is being evaluated via a billable video visit.    How would you like to obtain your AVS? MyChart  If the video visit is dropped, the invitation should be resent by: Send to e-mail at: [email or web address]  Will anyone else be joining your video visit? No      Assessment & Plan   Problem List Items Addressed This Visit       Binge eating disorder    Relevant Medications    lisdexamfetamine (VYVANSE) 50 MG capsule    BMI 23.0-23.9, adult     38 year old year old female in clinic today to discuss treatment of the following conditions through diet and lifestyle modification and weight loss:  1. History of obesity    2. Binge eating disorder    3. BMI 23.0-23.9, adult    4. Major depressive disorder, recurrent episode, mild (H24)    5. Psychophysiological insomnia    6. Poor concentration    7. Kidney infection    8. Dysuria    The patient's weight loss result since the last visit was mixed based on success.  Weight has fluctuated somewhat.  Stress has been incredibly high.  The trial of 30 mg dosing of Vyvanse was quite expensive and she paid cash.  We discussed that there is generic Vyvanse and that it would be probably less expensive at alternative pharmacies.  I prescribed 50 mg lisdexamfetamine.  Follow-up recommended 3 months.  Will increase dose based on feedback regarding binging behaviors.         Dysuria     Ongoing pain and discomfort in the right flank with associated dysuria.  She has 2 confirmed urine cultures during the month of February.  She feels like she never actually got better.  She was treated with the medicine that theoretically should have cleared her infection.  No history of kidney stones.  UA/UC ordered.  This will be done asynchronously as this was a video visit.  I think she probably will require additional therapy.  Will step her up to first-line therapy.  We discussed potential side effects associated with fluoroquinolone antibiotics.  Levofloxacin  "prescribed.  Follow-up ultrasound if pain continues ordered.         Relevant Orders    UA Macroscopic with reflex to Microscopic and Culture    US Kidney Right    History of obesity - Primary    Major depressive disorder, recurrent episode, mild (H24)     In therapy.  Stress remains high.  Continue escitalopram.  She did have a psychiatrist who started Seroquel.  The patient is concerned that Seroquel might cause iatrogenic weight gain.  She is correct to have this concern.  We discussed trying to take the lowest effective dose.  We also will see if she gets a similar benefit in sleep initiation with trazodone.  Discussed potential side effects.  Trazodone prescribed.  Follow-up if not improving.         Relevant Medications    traZODone (DESYREL) 50 MG tablet     Other Visit Diagnoses       Psychophysiological insomnia        Relevant Medications    traZODone (DESYREL) 50 MG tablet    Poor concentration        Relevant Orders    Adult Mental Health  Referral    Kidney infection        Relevant Medications    levofloxacin (LEVAQUIN) 750 MG tablet    Other Relevant Orders    UA Macroscopic with reflex to Microscopic and Culture    US Kidney Right           The longitudinal plan of care for the diagnosis(es)/condition(s) as documented were addressed during this visit. Due to the added complexity in care, I will continue to support Kathya in the subsequent management and with ongoing continuity of care.    Subjective   Kathya is a 38 year old, presenting for the following health issues:  Weight Loss (Follow-up ), UTI (Pelvic pain and rt side flank pain for a couple of days and worsen last night, possible kidney infection and UTI. ), and Recheck Medication (Discuss VYVANSE dosage and need refill)        3/20/2024     8:48 AM   Additional Questions   Roomed by xl     Weight:   - stress high.  Recent divorce.  \"Figure out kid stuff.\"   - she identifies seroquel as a barrier to weight loss. Provider \"already left.\" "    - ongoing therapy.      - BED continues   - she exercises a lot.  Weight was as low as 138 lbs. Worse in the past month.       History of Present Illness       Mental Health Follow-up:  Patient presents to follow-up on Depression & Anxiety.Patient's depression since last visit has been:  Better  The patient is not having other symptoms associated with depression.  Patient's anxiety since last visit has been:  Better  The patient is having other symptoms associated with anxiety.  Any significant life events: relationship concerns  Patient is feeling anxious or having panic attacks.  Patient has no concerns about alcohol or drug use.    Reason for visit:  Medication management    She eats 2-3 servings of fruits and vegetables daily.She consumes 0 sweetened beverage(s) daily.She exercises with enough effort to increase her heart rate 10 to 19 minutes per day.  She exercises with enough effort to increase her heart rate 4 days per week. She is missing 1 dose(s) of medications per week.  She is not taking prescribed medications regularly due to cost of medication and other.        Objective    Vitals - Patient Reported  Weight (Patient Reported): 67.1 kg (148 lb)        Physical Exam   GENERAL: alert and no distress  EYES: Eyes grossly normal to inspection.  No discharge or erythema, or obvious scleral/conjunctival abnormalities.  RESP: No audible wheeze, cough, or visible cyanosis.    SKIN: Visible skin clear. No significant rash, abnormal pigmentation or lesions.  NEURO: Cranial nerves grossly intact.  Mentation and speech appropriate for age.  PSYCH: Appropriate affect, tone, and pace of words        Video-Visit Details    Type of service:  Video Visit   Originating Location (pt. Location): Home    Distant Location (provider location):  On-site  Platform used for Video Visit: Alesha  Signed Electronically by: Rob Cooper MD

## 2024-03-20 NOTE — ASSESSMENT & PLAN NOTE
Ongoing pain and discomfort in the right flank with associated dysuria.  She has 2 confirmed urine cultures during the month of February.  She feels like she never actually got better.  She was treated with the medicine that theoretically should have cleared her infection.  No history of kidney stones.  UA/UC ordered.  This will be done asynchronously as this was a video visit.  I think she probably will require additional therapy.  Will step her up to first-line therapy.  We discussed potential side effects associated with fluoroquinolone antibiotics.  Levofloxacin prescribed.  Follow-up ultrasound if pain continues ordered.

## 2024-03-27 ENCOUNTER — MYC MEDICAL ADVICE (OUTPATIENT)
Dept: FAMILY MEDICINE | Facility: CLINIC | Age: 39
End: 2024-03-27
Payer: COMMERCIAL

## 2024-03-27 DIAGNOSIS — F50.819 BINGE EATING DISORDER: ICD-10-CM

## 2024-03-27 RX ORDER — LISDEXAMFETAMINE DIMESYLATE 50 MG/1
50 CAPSULE ORAL EVERY MORNING
Qty: 30 CAPSULE | Refills: 0 | Status: SHIPPED | OUTPATIENT
Start: 2024-03-27 | End: 2024-05-14

## 2024-04-02 ENCOUNTER — MYC MEDICAL ADVICE (OUTPATIENT)
Dept: FAMILY MEDICINE | Facility: CLINIC | Age: 39
End: 2024-04-02

## 2024-04-02 ENCOUNTER — OFFICE VISIT (OUTPATIENT)
Dept: FAMILY MEDICINE | Facility: CLINIC | Age: 39
End: 2024-04-02
Payer: COMMERCIAL

## 2024-04-02 VITALS
SYSTOLIC BLOOD PRESSURE: 123 MMHG | RESPIRATION RATE: 16 BRPM | DIASTOLIC BLOOD PRESSURE: 86 MMHG | OXYGEN SATURATION: 99 % | HEART RATE: 80 BPM | TEMPERATURE: 97.8 F | BODY MASS INDEX: 24.17 KG/M2 | WEIGHT: 152 LBS

## 2024-04-02 DIAGNOSIS — R39.89 URINARY PROBLEM: Primary | ICD-10-CM

## 2024-04-02 LAB
ALBUMIN UR-MCNC: NEGATIVE MG/DL
APPEARANCE UR: CLEAR
BACTERIA #/AREA URNS HPF: ABNORMAL /HPF
BILIRUB UR QL STRIP: NEGATIVE
COLOR UR AUTO: YELLOW
GLUCOSE UR STRIP-MCNC: NEGATIVE MG/DL
HGB UR QL STRIP: ABNORMAL
KETONES UR STRIP-MCNC: NEGATIVE MG/DL
LEUKOCYTE ESTERASE UR QL STRIP: NEGATIVE
NITRATE UR QL: NEGATIVE
PH UR STRIP: 5.5 [PH] (ref 5–8)
RBC #/AREA URNS AUTO: ABNORMAL /HPF
SP GR UR STRIP: 1.01 (ref 1–1.03)
SQUAMOUS #/AREA URNS AUTO: ABNORMAL /LPF
UROBILINOGEN UR STRIP-ACNC: 0.2 E.U./DL
WBC #/AREA URNS AUTO: ABNORMAL /HPF

## 2024-04-02 PROCEDURE — 81001 URINALYSIS AUTO W/SCOPE: CPT | Performed by: FAMILY MEDICINE

## 2024-04-02 PROCEDURE — 99214 OFFICE O/P EST MOD 30 MIN: CPT | Performed by: FAMILY MEDICINE

## 2024-04-02 PROCEDURE — 87086 URINE CULTURE/COLONY COUNT: CPT | Performed by: FAMILY MEDICINE

## 2024-04-02 RX ORDER — SOLIFENACIN SUCCINATE 5 MG/1
5 TABLET, FILM COATED ORAL DAILY
Qty: 7 TABLET | Refills: 0 | Status: SHIPPED | OUTPATIENT
Start: 2024-04-02 | End: 2024-04-09

## 2024-04-02 NOTE — TELEPHONE ENCOUNTER
Seen at [facility] today, 4/2/2024 - patient referred to Urology, but no imaging.      Sally Sierra RN  [facility]

## 2024-04-02 NOTE — PATIENT INSTRUCTIONS
I will send in urine culture and see if any bacteria grow    I sent I some vesicare for spasm to see if helps.    I referred you to urology

## 2024-04-02 NOTE — PROGRESS NOTES
Assessment & Plan     Urinary problem  On-going bladder concerns.  UA here neg  On anbx  Will order Urine culture.  Start on vesicare for spasm  Discuss with urology/PCP imaging   Referral to urology  - UA Macroscopic with reflex to Microscopic and Culture - Clinic Collect  - UA Microscopic with Reflex to Culture  - solifenacin (VESICARE) 5 MG tablet  Dispense: 7 tablet; Refill: 0  - Adult Urology  Referral  - Urine Culture Aerobic Bacterial  - Urine Culture Aerobic Bacterial             No follow-ups on file.    Beto Anderson MD  [facility]    Leslee Rasheed is a 38 year old female who presents to clinic today for the following health issues:  Chief Complaint   Patient presents with    Urinary Problem     Taking antibiotic      HPI    Was put on levaquin for bladder concern.  States has been dealing with infection/bladder pressure.  States kidney concern.  Bladder spasm        Review of Systems        Objective    /86   Pulse 80   Temp 97.8  F (36.6  C)   Resp 16   Wt 68.9 kg (152 lb)   LMP 04/02/2024   SpO2 99%   BMI 24.17 kg/m    Physical Exam  Vitals and nursing note reviewed.   Constitutional:       Appearance: Normal appearance.   Cardiovascular:      Rate and Rhythm: Normal rate and regular rhythm.      Pulses: Normal pulses.      Heart sounds: Normal heart sounds.   Pulmonary:      Effort: Pulmonary effort is normal.      Breath sounds: Normal breath sounds.   Abdominal:      General: Abdomen is flat.      Palpations: Abdomen is soft.      Tenderness: There is right CVA tenderness. There is no left CVA tenderness.   Neurological:      Mental Status: She is alert.

## 2024-04-03 LAB — BACTERIA UR CULT: NO GROWTH

## 2024-04-10 ENCOUNTER — OFFICE VISIT (OUTPATIENT)
Dept: UROLOGY | Facility: CLINIC | Age: 39
End: 2024-04-10
Attending: OBSTETRICS & GYNECOLOGY
Payer: COMMERCIAL

## 2024-04-10 VITALS
SYSTOLIC BLOOD PRESSURE: 117 MMHG | BODY MASS INDEX: 24.17 KG/M2 | DIASTOLIC BLOOD PRESSURE: 75 MMHG | WEIGHT: 152 LBS | HEART RATE: 76 BPM

## 2024-04-10 DIAGNOSIS — R39.89 URINARY PROBLEM: ICD-10-CM

## 2024-04-10 DIAGNOSIS — N23 KIDNEY PAIN: Primary | ICD-10-CM

## 2024-04-10 DIAGNOSIS — N30.00 ACUTE CYSTITIS WITHOUT HEMATURIA: ICD-10-CM

## 2024-04-10 DIAGNOSIS — R39.89 BLADDER PAIN: ICD-10-CM

## 2024-04-10 PROCEDURE — 99215 OFFICE O/P EST HI 40 MIN: CPT | Performed by: OBSTETRICS & GYNECOLOGY

## 2024-04-10 PROCEDURE — 99213 OFFICE O/P EST LOW 20 MIN: CPT | Performed by: OBSTETRICS & GYNECOLOGY

## 2024-04-10 RX ORDER — CELECOXIB 100 MG/1
100 CAPSULE ORAL 2 TIMES DAILY
Qty: 60 CAPSULE | Refills: 1 | Status: SHIPPED | OUTPATIENT
Start: 2024-04-10

## 2024-04-10 ASSESSMENT — PAIN SCALES - GENERAL: PAINLEVEL: MILD PAIN (2)

## 2024-04-10 NOTE — LETTER
4/10/2024       RE: Kathya Chino  [address] [facility] [address]     Dear Colleague,    Thank you for referring your patient, Kathya Chino, to the [facility] [place] [facility]. Please see a copy of my visit note below.    April 10, 2024    Referring Provider: Beto Anderson MD  [address] [place] [address]    Primary Care Provider: Rob Cooper    CC: bladder pain    HPI:  Kathya Chino is a 38 year old female who presents for evaluation of her pelvic floor symptoms.  Kathya reports that she had a klebsiella UTI (sens to all but ampicillin) that started 2/1/24. She was treated with cefdinir for 7 days without improvement in her sx.s. Repeat UC showed she still had a klebsiella UTI. She was then treated with levaquin for 10 days without resolution of her sx.s. She has continued to have symptoms with only minimal improvement. UC doen 4/2/24 was no growth. And her UA was normal.    Kathya reports right sided kidney pain, dysuria and bladder spasms. She was recently started vesicare without improvement in her sx.s. She has tried motrin and Azo with no improvement.      Past Medical History:   Diagnosis Date    Anxiety     Benign paroxysmal positional vertigo, right 5/5/2023    Depressive disorder 2005    Dysmenorrhea     Endometriosis     Joint pain     Mental disorder     anxiety    Migraine     Psoriatic arthritis (H)     Stillbirth of single fetus     32wks, breech       Past Surgical History:   Procedure Laterality Date    ABDOMEN SURGERY  2004    Laparoscopy    BIOPSY  2004    EYE SURGERY  2014     DILATION/CURETTAGE DIAG/THER NON OB      Description: Dilation And Curettage;  Proc Date: 03/01/2010;  Comments: s/p delivery for post-partum bleeding       Social History     Socioeconomic History    Marital status:      Spouse name: Not on file    Number of children: Not on file    Years of education:  Not on file    Highest education level: Not on file   Occupational History    Not on file   Tobacco Use    Smoking status: Never     Passive exposure: Past    Smokeless tobacco: Never   Vaping Use    Vaping status: Never Used   Substance and Sexual Activity    Alcohol use: No    Drug use: No    Sexual activity: Yes     Partners: Male     Birth control/protection: Male Surgical, None   Other Topics Concern    Parent/sibling w/ CABG, MI or angioplasty before 65F 55M? Yes     Comment: Father  at 57 and brother at 35   Social History Narrative    Not on file     Social Determinants of Health     Financial Resource Strain: Low Risk  (2023)    Financial Resource Strain     Within the past 12 months, have you or your family members you live with been unable to get utilities (heat, electricity) when it was really needed?: No   Food Insecurity: Low Risk  (2023)    Food Insecurity     Within the past 12 months, did you worry that your food would run out before you got money to buy more?: No     Within the past 12 months, did the food you bought just not last and you didn t have money to get more?: No   Transportation Needs: Low Risk  (2023)    Transportation Needs     Within the past 12 months, has lack of transportation kept you from medical appointments, getting your medicines, non-medical meetings or appointments, work, or from getting things that you need?: No   Physical Activity: Not on file   Stress: Not on file   Social Connections: Not on file   Interpersonal Safety: Low Risk  (2023)    Interpersonal Safety     Do you feel physically and emotionally safe where you currently live?: Yes     Within the past 12 months, have you been hit, slapped, kicked or otherwise physically hurt by someone?: No     Within the past 12 months, have you been humiliated or emotionally abused in other ways by your partner or ex-partner?: No   Housing Stability: Low Risk  (2023)    Housing Stability     Do you  "have housing? : Yes     Are you worried about losing your housing?: No       Family History   Problem Relation Age of Onset    Family History Negative Mother         colon polyps    Diabetes Mother         type 2    Lung Cancer Mother     Chronic Obstructive Pulmonary Disease Father     Hypertension Father     Prostate Cancer Father     Coronary Artery Disease Father     Alzheimer Disease Maternal Grandmother     Melanoma Maternal Grandmother     Colon Cancer Maternal Grandfather         Bladder cancer that moved to [facility]    Coronary Artery Disease Brother         atherosclerosis and cardiac arrest.  at 35    Anxiety Disorder Sister     Obesity Sister     Depression Sister     Autism Spectrum Disorder Son     Autism Spectrum Disorder Daughter     Other - See Comments Daughter         passed away during birth     Melanoma Maternal Aunt     Thyroid Cancer No family hx of        ROS    No Known Allergies    Current Outpatient Medications   Medication Sig Dispense Refill    BD SAFETYGLIDE SYRINGE/NEEDLE 27G X \" 1 ML MISC TO BE USED WITH INJECTABLE METHOTREXATE      buPROPion (WELLBUTRIN XL) 150 MG 24 hr tablet TAKE 1 TABLET(150 MG) BY MOUTH EVERY MORNING 90 tablet 1    buPROPion (WELLBUTRIN XL) 300 MG 24 hr tablet Take 1 tablet (300 mg) by mouth every morning (total dose: 150 mg + 300 mg = 450 mg) 90 tablet 3    clonazePAM (KLONOPIN) 0.5 MG ODT DISSOLVE 1 TABLET(0.5 MG) ON THE TONGUE EVERY NIGHT AS NEEDED FOR ANXIETY 20 tablet 0    escitalopram (LEXAPRO) 20 MG tablet Take 1 tablet (20 mg) by mouth daily 90 tablet 3    folic acid (FOLVITE) 1 MG tablet TAKE 2 TABLETS BY MOUTH EVERY MORNING      HUMIRA *CF* PEN 40 MG/0.4ML pen kit       levofloxacin (LEVAQUIN) 750 MG tablet Take 1 tablet (750 mg) by mouth daily 7 tablet 0    meclizine (ANTIVERT) 25 MG tablet Take 1 tablet (25 mg) by mouth 3 times daily as needed for dizziness  0    methotrexate 50 MG/2ML injection INJECT 0.7 ML SUBCUTANEOUSLY ONCE WEEKLY      " norethindrone-ethinyl estradiol (MICROGESTIN 1.5/30) 1.5-30 MG-MCG tablet Take 1 tablet by mouth daily 84 tablet 3    ondansetron (ZOFRAN ODT) 4 MG ODT tab Take 1 tablet (4 mg) by mouth every 8 hours as needed for nausea 15 tablet 0    predniSONE (DELTASONE) 5 MG tablet as needed      QUEtiapine (SEROQUEL) 25 MG tablet Take 50 mg by mouth at bedtime      traZODone (DESYREL) 50 MG tablet Take 1-2 tablets ( mg) by mouth at bedtime 60 tablet 0    calcium carbonate 500 mg, elemental, (OSCAL 500) 1250 (500 Ca) MG TABS tablet Take 2 tablets by mouth daily      lisdexamfetamine (VYVANSE) 50 MG capsule Take 1 capsule (50 mg) by mouth every morning 30 capsule 0    phenazopyridine (PYRIDIUM) 200 MG tablet Take 1 tablet (200 mg) by mouth 3 times daily as needed for irritation 6 tablet 0    Vitamin D3 (CHOLECALCIFEROL) 125 MCG (5000 UT) tablet Take 1 tablet by mouth daily       No current facility-administered medications for this visit.       /75   Pulse 76   Wt 68.9 kg (152 lb)   LMP 04/02/2024   BMI 24.17 kg/m   Patient's last menstrual period was 04/02/2024. Body mass index is 24.17 kg/m .  Ms. Chino is alert, comfortable in no acute distress, non-labored breathing.   Abdomen is soft, non-tender, non-distended, no CVAT.    Normal external female genitalia. The urethra was normal appearing without mass, NT.    She has good support on supine strain.  Speculum and bimanual exam are remarkable for tenderness at the upper vagina at the bladder base.      3/5 kegels.      A/P: Kathya Chino is a 38 year old F with bladder pain, urgency, frequnecy and kidney pain after a UTI    Will get CT urogram to R/O upper track issue. We discussed doing a cystoscopy, will hold for now. Symptoms probably from prolonged inflammatory response. Discontinue motrin, start celebrex. Continue Azo.    I spent a total of 45 minutes with  Ms. Chino  on the date of the encounter in chart review, face to face patient visit,  review of tests, documentation and/or discussion with other providers about the issues documented above.    Tan Torrez MD  Professor, OB/GYN  Urogynecologist  CC  Patient Care Team:  Rob Cooper MD as PCP - General (Family Medicine)  Rob Cooper MD as Assigned PCP  Annika Solo APRN CNM as Assigned OBGYN Provider  Tan Torrez MD as MD (OB/Gyn)  DAVID PANDEY

## 2024-04-10 NOTE — PROGRESS NOTES
April 10, 2024    Referring Provider: Beto Anderson MD  [address] [place] [address]    Primary Care Provider: Rob Cooper    CC: bladder pain    HPI:  Kathya Chino is a 38 year old female who presents for evaluation of her pelvic floor symptoms.  Kathya reports that she had a klebsiella UTI (sens to all but ampicillin) that started 2/1/24. She was treated with cefdinir for 7 days without improvement in her sx.s. Repeat UC showed she still had a klebsiella UTI. She was then treated with levaquin for 10 days without resolution of her sx.s. She has continued to have symptoms with only minimal improvement. UC doen 4/2/24 was no growth. And her UA was normal.    Kathya reports right sided kidney pain, dysuria and bladder spasms. She was recently started vesicare without improvement in her sx.s. She has tried motrin and Azo with no improvement.      Past Medical History:   Diagnosis Date    Anxiety     Benign paroxysmal positional vertigo, right 5/5/2023    Depressive disorder 2005    Dysmenorrhea     Endometriosis     Joint pain     Mental disorder     anxiety    Migraine     Psoriatic arthritis (H)     Stillbirth of single fetus     32wks, breech       Past Surgical History:   Procedure Laterality Date    ABDOMEN SURGERY  2004    Laparoscopy    BIOPSY  2004    EYE SURGERY  2014     DILATION/CURETTAGE DIAG/THER NON OB      Description: Dilation And Curettage;  Proc Date: 03/01/2010;  Comments: s/p delivery for post-partum bleeding       Social History     Socioeconomic History    Marital status:      Spouse name: Not on file    Number of children: Not on file    Years of education: Not on file    Highest education level: Not on file   Occupational History    Not on file   Tobacco Use    Smoking status: Never     Passive exposure: Past    Smokeless tobacco: Never   Vaping Use    Vaping status: Never Used   Substance and Sexual Activity    Alcohol use: No    Drug use: No    Sexual activity:  Yes     Partners: Male     Birth control/protection: Male Surgical, None   Other Topics Concern    Parent/sibling w/ CABG, MI or angioplasty before 65F 55M? Yes     Comment: Father  at 57 and brother at 35   Social History Narrative    Not on file     Social Determinants of Health     Financial Resource Strain: Low Risk  (2023)    Financial Resource Strain     Within the past 12 months, have you or your family members you live with been unable to get utilities (heat, electricity) when it was really needed?: No   Food Insecurity: Low Risk  (2023)    Food Insecurity     Within the past 12 months, did you worry that your food would run out before you got money to buy more?: No     Within the past 12 months, did the food you bought just not last and you didn t have money to get more?: No   Transportation Needs: Low Risk  (2023)    Transportation Needs     Within the past 12 months, has lack of transportation kept you from medical appointments, getting your medicines, non-medical meetings or appointments, work, or from getting things that you need?: No   Physical Activity: Not on file   Stress: Not on file   Social Connections: Not on file   Interpersonal Safety: Low Risk  (2023)    Interpersonal Safety     Do you feel physically and emotionally safe where you currently live?: Yes     Within the past 12 months, have you been hit, slapped, kicked or otherwise physically hurt by someone?: No     Within the past 12 months, have you been humiliated or emotionally abused in other ways by your partner or ex-partner?: No   Housing Stability: Low Risk  (2023)    Housing Stability     Do you have housing? : Yes     Are you worried about losing your housing?: No       Family History   Problem Relation Age of Onset    Family History Negative Mother         colon polyps    Diabetes Mother         type 2    Lung Cancer Mother     Chronic Obstructive Pulmonary Disease Father     Hypertension Father      "Prostate Cancer Father     Coronary Artery Disease Father     Alzheimer Disease Maternal Grandmother     Melanoma Maternal Grandmother     Colon Cancer Maternal Grandfather         Bladder cancer that moved to [facility]    Coronary Artery Disease Brother         atherosclerosis and cardiac arrest.  at 35    Anxiety Disorder Sister     Obesity Sister     Depression Sister     Autism Spectrum Disorder Son     Autism Spectrum Disorder Daughter     Other - See Comments Daughter         passed away during birth     Melanoma Maternal Aunt     Thyroid Cancer No family hx of        ROS    No Known Allergies    Current Outpatient Medications   Medication Sig Dispense Refill    BD SAFETYGLIDE SYRINGE/NEEDLE 27G X 5/8\" 1 ML MISC TO BE USED WITH INJECTABLE METHOTREXATE      buPROPion (WELLBUTRIN XL) 150 MG 24 hr tablet TAKE 1 TABLET(150 MG) BY MOUTH EVERY MORNING 90 tablet 1    buPROPion (WELLBUTRIN XL) 300 MG 24 hr tablet Take 1 tablet (300 mg) by mouth every morning (total dose: 150 mg + 300 mg = 450 mg) 90 tablet 3    clonazePAM (KLONOPIN) 0.5 MG ODT DISSOLVE 1 TABLET(0.5 MG) ON THE TONGUE EVERY NIGHT AS NEEDED FOR ANXIETY 20 tablet 0    escitalopram (LEXAPRO) 20 MG tablet Take 1 tablet (20 mg) by mouth daily 90 tablet 3    folic acid (FOLVITE) 1 MG tablet TAKE 2 TABLETS BY MOUTH EVERY MORNING      HUMIRA *CF* PEN 40 MG/0.4ML pen kit       levofloxacin (LEVAQUIN) 750 MG tablet Take 1 tablet (750 mg) by mouth daily 7 tablet 0    meclizine (ANTIVERT) 25 MG tablet Take 1 tablet (25 mg) by mouth 3 times daily as needed for dizziness  0    methotrexate 50 MG/2ML injection INJECT 0.7 ML SUBCUTANEOUSLY ONCE WEEKLY      norethindrone-ethinyl estradiol (MICROGESTIN 1.5/30) 1.5-30 MG-MCG tablet Take 1 tablet by mouth daily 84 tablet 3    ondansetron (ZOFRAN ODT) 4 MG ODT tab Take 1 tablet (4 mg) by mouth every 8 hours as needed for nausea 15 tablet 0    predniSONE (DELTASONE) 5 MG tablet as needed      QUEtiapine (SEROQUEL) 25 " MG tablet Take 50 mg by mouth at bedtime      traZODone (DESYREL) 50 MG tablet Take 1-2 tablets ( mg) by mouth at bedtime 60 tablet 0    calcium carbonate 500 mg, elemental, (OSCAL 500) 1250 (500 Ca) MG TABS tablet Take 2 tablets by mouth daily      lisdexamfetamine (VYVANSE) 50 MG capsule Take 1 capsule (50 mg) by mouth every morning 30 capsule 0    phenazopyridine (PYRIDIUM) 200 MG tablet Take 1 tablet (200 mg) by mouth 3 times daily as needed for irritation 6 tablet 0    Vitamin D3 (CHOLECALCIFEROL) 125 MCG (5000 UT) tablet Take 1 tablet by mouth daily       No current facility-administered medications for this visit.       /75   Pulse 76   Wt 68.9 kg (152 lb)   LMP 04/02/2024   BMI 24.17 kg/m   Patient's last menstrual period was 04/02/2024. Body mass index is 24.17 kg/m .  Ms. Chino is alert, comfortable in no acute distress, non-labored breathing.   Abdomen is soft, non-tender, non-distended, no CVAT.    Normal external female genitalia. The urethra was normal appearing without mass, NT.    She has good support on supine strain.  Speculum and bimanual exam are remarkable for tenderness at the upper vagina at the bladder base.      3/5 kegels.      A/P: Kathya Chino is a 38 year old F with bladder pain, urgency, frequnecy and kidney pain after a UTI    Will get CT urogram to R/O upper track issue. We discussed doing a cystoscopy, will hold for now. Symptoms probably from prolonged inflammatory response. Discontinue motrin, start celebrex. Continue Azo.    I spent a total of 45 minutes with  Ms. Chino  on the date of the encounter in chart review, face to face patient visit, review of tests, documentation and/or discussion with other providers about the issues documented above.    Tan Torrez MD  Professor, OB/GYN  Urogynecologist  CC  Patient Care Team:  Rob Cooper MD as PCP - General (Family Medicine)  Rob Cooper MD as Assigned PCP  Annika Solo APRN CN  as Assigned OBGYN Provider  Tan Torrez MD as MD (OB/Gyn)  DAVID PANDEY

## 2024-04-23 ENCOUNTER — HOSPITAL ENCOUNTER (OUTPATIENT)
Dept: CT IMAGING | Facility: HOSPITAL | Age: 39
Discharge: HOME | End: 2024-04-23
Attending: OBSTETRICS & GYNECOLOGY | Admitting: OBSTETRICS & GYNECOLOGY
Payer: COMMERCIAL

## 2024-04-23 DIAGNOSIS — N23 KIDNEY PAIN: ICD-10-CM

## 2024-04-23 PROCEDURE — 250N000009 HC RX 250: Performed by: OBSTETRICS & GYNECOLOGY

## 2024-04-23 PROCEDURE — 74178 CT ABD&PLV WO CNTR FLWD CNTR: CPT

## 2024-04-23 PROCEDURE — 250N000011 HC RX IP 250 OP 636: Performed by: OBSTETRICS & GYNECOLOGY

## 2024-04-23 RX ORDER — IOPAMIDOL 755 MG/ML
90 INJECTION, SOLUTION INTRAVASCULAR ONCE
Status: COMPLETED | OUTPATIENT
Start: 2024-04-23 | End: 2024-04-23

## 2024-04-23 RX ADMIN — SODIUM CHLORIDE 90 ML: 9 INJECTION, SOLUTION INTRAVENOUS at 13:58

## 2024-04-23 RX ADMIN — IOPAMIDOL 90 ML: 755 INJECTION, SOLUTION INTRAVENOUS at 13:57

## 2024-05-13 ENCOUNTER — MYC MEDICAL ADVICE (OUTPATIENT)
Dept: FAMILY MEDICINE | Facility: CLINIC | Age: 39
End: 2024-05-13
Payer: COMMERCIAL

## 2024-05-13 DIAGNOSIS — F50.819 BINGE EATING DISORDER: ICD-10-CM

## 2024-05-13 NOTE — TELEPHONE ENCOUNTER
3/20/24 OV note-   BMI 23.0-23.9, adult        38 year old year old female in clinic today to discuss treatment of the following conditions through diet and lifestyle modification and weight loss:  1. History of obesity    2. Binge eating disorder    3. BMI 23.0-23.9, adult    4. Major depressive disorder, recurrent episode, mild (H24)    5. Psychophysiological insomnia    6. Poor concentration    7. Kidney infection    8. Dysuria    The patient's weight loss result since the last visit was mixed based on success.  Weight has fluctuated somewhat.  Stress has been incredibly high.  The trial of 30 mg dosing of Vyvanse was quite expensive and she paid cash.  We discussed that there is generic Vyvanse and that it would be probably less expensive at alternative pharmacies.  I prescribed 50 mg lisdexamfetamine.  Follow-up recommended 3 months.  Will increase dose based on feedback regarding binging behaviors.

## 2024-05-14 RX ORDER — LISDEXAMFETAMINE DIMESYLATE 60 MG/1
60 CAPSULE ORAL EVERY MORNING
Qty: 30 CAPSULE | Refills: 0 | Status: SHIPPED | OUTPATIENT
Start: 2024-05-14 | End: 2024-05-16

## 2024-05-16 RX ORDER — LISDEXAMFETAMINE DIMESYLATE 60 MG/1
60 CAPSULE ORAL EVERY MORNING
Qty: 30 CAPSULE | Refills: 0 | Status: SHIPPED | OUTPATIENT
Start: 2024-05-16 | End: 2024-06-25

## 2024-06-25 ENCOUNTER — MYC REFILL (OUTPATIENT)
Dept: FAMILY MEDICINE | Facility: CLINIC | Age: 39
End: 2024-06-25
Payer: COMMERCIAL

## 2024-06-25 DIAGNOSIS — F50.819 BINGE EATING DISORDER: ICD-10-CM

## 2024-06-25 RX ORDER — LISDEXAMFETAMINE DIMESYLATE 60 MG/1
60 CAPSULE ORAL EVERY MORNING
Qty: 30 CAPSULE | Refills: 0 | Status: SHIPPED | OUTPATIENT
Start: 2024-06-25 | End: 2024-08-02

## 2024-07-15 ENCOUNTER — OFFICE VISIT (OUTPATIENT)
Dept: FAMILY MEDICINE | Facility: CLINIC | Age: 39
End: 2024-07-15
Payer: COMMERCIAL

## 2024-07-15 VITALS
TEMPERATURE: 98.5 F | BODY MASS INDEX: 23.31 KG/M2 | WEIGHT: 146.6 LBS | RESPIRATION RATE: 18 BRPM | HEART RATE: 109 BPM | OXYGEN SATURATION: 97 % | SYSTOLIC BLOOD PRESSURE: 120 MMHG | DIASTOLIC BLOOD PRESSURE: 81 MMHG

## 2024-07-15 DIAGNOSIS — R42 DIZZINESS: ICD-10-CM

## 2024-07-15 DIAGNOSIS — M79.89 BILATERAL SWELLING OF FEET: ICD-10-CM

## 2024-07-15 DIAGNOSIS — R00.0 TACHYCARDIA: Primary | ICD-10-CM

## 2024-07-15 LAB
ALBUMIN SERPL BCG-MCNC: 4.3 G/DL (ref 3.5–5.2)
ALBUMIN UR-MCNC: NEGATIVE MG/DL
ALP SERPL-CCNC: 46 U/L (ref 40–150)
ALT SERPL W P-5'-P-CCNC: 15 U/L (ref 0–50)
ANION GAP SERPL CALCULATED.3IONS-SCNC: 9 MMOL/L (ref 7–15)
APPEARANCE UR: CLEAR
AST SERPL W P-5'-P-CCNC: 24 U/L (ref 0–45)
ATRIAL RATE - MUSE: 98 BPM
BACTERIA #/AREA URNS HPF: ABNORMAL /HPF
BASOPHILS # BLD AUTO: 0 10E3/UL (ref 0–0.2)
BASOPHILS NFR BLD AUTO: 1 %
BILIRUB SERPL-MCNC: 0.3 MG/DL
BILIRUB UR QL STRIP: NEGATIVE
BUN SERPL-MCNC: 8.3 MG/DL (ref 6–20)
CALCIUM SERPL-MCNC: 9.6 MG/DL (ref 8.6–10)
CHLORIDE SERPL-SCNC: 102 MMOL/L (ref 98–107)
COLOR UR AUTO: YELLOW
CREAT SERPL-MCNC: 0.87 MG/DL (ref 0.51–0.95)
D DIMER PPP FEU-MCNC: 0.3 UG/ML FEU (ref 0–0.5)
DIASTOLIC BLOOD PRESSURE - MUSE: NORMAL MMHG
EGFRCR SERPLBLD CKD-EPI 2021: 88 ML/MIN/1.73M2
EOSINOPHIL # BLD AUTO: 0.1 10E3/UL (ref 0–0.7)
EOSINOPHIL NFR BLD AUTO: 1 %
ERYTHROCYTE [DISTWIDTH] IN BLOOD BY AUTOMATED COUNT: 12.8 % (ref 10–15)
GLUCOSE SERPL-MCNC: 96 MG/DL (ref 70–99)
GLUCOSE UR STRIP-MCNC: NEGATIVE MG/DL
HCO3 SERPL-SCNC: 25 MMOL/L (ref 22–29)
HCT VFR BLD AUTO: 41.4 % (ref 35–47)
HGB BLD-MCNC: 13.3 G/DL (ref 11.7–15.7)
HGB UR QL STRIP: ABNORMAL
IMM GRANULOCYTES # BLD: 0 10E3/UL
IMM GRANULOCYTES NFR BLD: 0 %
INTERPRETATION ECG - MUSE: NORMAL
KETONES UR STRIP-MCNC: NEGATIVE MG/DL
LEUKOCYTE ESTERASE UR QL STRIP: NEGATIVE
LYMPHOCYTES # BLD AUTO: 3.4 10E3/UL (ref 0.8–5.3)
LYMPHOCYTES NFR BLD AUTO: 47 %
MCH RBC QN AUTO: 27.6 PG (ref 26.5–33)
MCHC RBC AUTO-ENTMCNC: 32.1 G/DL (ref 31.5–36.5)
MCV RBC AUTO: 86 FL (ref 78–100)
MONOCYTES # BLD AUTO: 0.4 10E3/UL (ref 0–1.3)
MONOCYTES NFR BLD AUTO: 6 %
NEUTROPHILS # BLD AUTO: 3.2 10E3/UL (ref 1.6–8.3)
NEUTROPHILS NFR BLD AUTO: 45 %
NITRATE UR QL: POSITIVE
NT-PROBNP SERPL-MCNC: <36 PG/ML (ref 0–450)
P AXIS - MUSE: 67 DEGREES
PH UR STRIP: 6 [PH] (ref 5–8)
PLATELET # BLD AUTO: 305 10E3/UL (ref 150–450)
POTASSIUM SERPL-SCNC: 4 MMOL/L (ref 3.4–5.3)
PR INTERVAL - MUSE: 128 MS
PROT SERPL-MCNC: 7.2 G/DL (ref 6.4–8.3)
QRS DURATION - MUSE: 82 MS
QT - MUSE: 354 MS
QTC - MUSE: 451 MS
R AXIS - MUSE: 39 DEGREES
RBC # BLD AUTO: 4.82 10E6/UL (ref 3.8–5.2)
RBC #/AREA URNS AUTO: ABNORMAL /HPF
SODIUM SERPL-SCNC: 136 MMOL/L (ref 135–145)
SP GR UR STRIP: 1.01 (ref 1–1.03)
SQUAMOUS #/AREA URNS AUTO: ABNORMAL /LPF
SYSTOLIC BLOOD PRESSURE - MUSE: NORMAL MMHG
T AXIS - MUSE: 58 DEGREES
TROPONIN T SERPL HS-MCNC: <6 NG/L
TSH SERPL DL<=0.005 MIU/L-ACNC: 0.73 UIU/ML (ref 0.3–4.2)
UROBILINOGEN UR STRIP-ACNC: 0.2 E.U./DL
VENTRICULAR RATE- MUSE: 98 BPM
WBC # BLD AUTO: 7.2 10E3/UL (ref 4–11)
WBC #/AREA URNS AUTO: ABNORMAL /HPF

## 2024-07-15 PROCEDURE — 83880 ASSAY OF NATRIURETIC PEPTIDE: CPT | Performed by: PHYSICIAN ASSISTANT

## 2024-07-15 PROCEDURE — 86618 LYME DISEASE ANTIBODY: CPT | Performed by: PHYSICIAN ASSISTANT

## 2024-07-15 PROCEDURE — 87086 URINE CULTURE/COLONY COUNT: CPT | Performed by: PHYSICIAN ASSISTANT

## 2024-07-15 PROCEDURE — 85025 COMPLETE CBC W/AUTO DIFF WBC: CPT | Performed by: PHYSICIAN ASSISTANT

## 2024-07-15 PROCEDURE — 80053 COMPREHEN METABOLIC PANEL: CPT | Performed by: PHYSICIAN ASSISTANT

## 2024-07-15 PROCEDURE — 99214 OFFICE O/P EST MOD 30 MIN: CPT | Performed by: PHYSICIAN ASSISTANT

## 2024-07-15 PROCEDURE — 81001 URINALYSIS AUTO W/SCOPE: CPT | Performed by: PHYSICIAN ASSISTANT

## 2024-07-15 PROCEDURE — 85379 FIBRIN DEGRADATION QUANT: CPT | Performed by: PHYSICIAN ASSISTANT

## 2024-07-15 PROCEDURE — 36415 COLL VENOUS BLD VENIPUNCTURE: CPT | Performed by: PHYSICIAN ASSISTANT

## 2024-07-15 PROCEDURE — 84443 ASSAY THYROID STIM HORMONE: CPT | Performed by: PHYSICIAN ASSISTANT

## 2024-07-15 PROCEDURE — 93005 ELECTROCARDIOGRAM TRACING: CPT | Performed by: PHYSICIAN ASSISTANT

## 2024-07-15 PROCEDURE — 93010 ELECTROCARDIOGRAM REPORT: CPT | Performed by: INTERNAL MEDICINE

## 2024-07-15 PROCEDURE — 84484 ASSAY OF TROPONIN QUANT: CPT | Performed by: PHYSICIAN ASSISTANT

## 2024-07-15 ASSESSMENT — ENCOUNTER SYMPTOMS
DYSURIA: 0
VOMITING: 0
ABDOMINAL PAIN: 1
FEVER: 0
DIZZINESS: 1
FREQUENCY: 1
LIGHT-HEADEDNESS: 1
NAUSEA: 1
SHORTNESS OF BREATH: 1
COUGH: 0
SORE THROAT: 0

## 2024-07-15 NOTE — PROGRESS NOTES
Patient presents with:  Dizziness: Symptoms started a couple of weeks ago. Dizzy( not orthostatic), swelling of hands and feet, abdominal pressure that comes and goes, SOB, fatigue, nausea.       Clinical Decision Making:  Patient experiencing many generalized symptoms including bilateral hand and feet swelling, dizziness, abdominal pressure, fatigue, nausea.  EKG, D-dimer, troponin, and BNP are all normal.  This rules out CHF and PE.  Also ischemic heart disease is unlikely.  CMP, CBC, and UA are also normal.  Her positive nitrates are likely false positive due to her use of Azo.  Urine culture in process.  Due to low abdominal discomfort and lack of time to complete CT scanning I recommend patient have follow-up with the acute diagnostic services clinic.  The ADS provider agreed to see her tomorrow morning and report was given, and patient is happy to follow-up again.    Patient had had her initial D-dimer drawn, but there was not enough blood in the tube so she was asked to return for redraw.  Redrawn D-dimer was normal.      ICD-10-CM    1. Tachycardia  R00.0 D dimer, quantitative     Troponin T, High Sensitivity     EKG 12-lead, tracing only     TSH with free T4 reflex     D dimer, quantitative     Troponin T, High Sensitivity     TSH with free T4 reflex     Referral to Acute and Diagnostic Services (Day of diagnostic / First order acute)      2. Bilateral swelling of feet  M79.89 BNP-N terminal pro     BNP-N terminal pro      3. Dizziness  R42 UA Macroscopic with reflex to Microscopic and Culture - Clinic Collect     TSH with free T4 reflex     Comprehensive metabolic panel (BMP + Alb, Alk Phos, ALT, AST, Total. Bili, TP)     CBC with platelets and differential     LYME DISEASE TOTAL ANTIBODIES WITH REFLEX TO CONFIRMATION     TSH with free T4 reflex     Comprehensive metabolic panel (BMP + Alb, Alk Phos, ALT, AST, Total. Bili, TP)     CBC with platelets and differential     LYME DISEASE TOTAL ANTIBODIES WITH  REFLEX TO CONFIRMATION     Urine Microscopic Exam     Urine Culture          Patient Instructions   Your complete blood count is normal. Your EKG appears normal. Your urine test does not show any signs for infection at this time; we will be doing a urine culture to confirm the absence of infection.  We are waiting on the results of your D Dimer, BNP, comprehensive metabolic panel, thyroid hormone study, Lyme disease, and Troponin test. Please keep your phone on you today, so that I can let you know the D dimer test result once it's back. If the D dimer is elevated I would need to direct you to the emergency department. If it's normal I will recommend follow up with the Acute Diagnostic Services Clinic tomorrow at 10:30AM at the [facility] for further evaluation.     HPI:  Kathya Chino is a 38 year old female who presents today with concerns of dizziness that started a couple of weeks ago.  Patient also experiencing swelling of hands and feet, abdominal pressure that comes and goes, shortness of breath, fatigue, and nausea. Patient had previously had a laparoscopy years ago due to endometriosis, but no other prior abdominal surgeries. No known tick bites. She is outdoors a lot. The dizziness feels like a drunk sensation, but doesn't seem associated with positions or movement. She takes Humera and she is supposed to be on Methotrexate due to hx of psoriatic arthritis, but she hasn't been taking that lately. She has a strong cardiac hx with sudden cardiac death in her brother and father. She reports that the shortness of breath is associated with the abdominal pressure. She denies pleuritic CP or calf pain. Her feet become more swollen at night and she has associated itching with it. She has not had any vomiting. No URI symptoms. No rashes. See ROS.  Patient also has a history of previous urinary tract infections and was seen by urology in April of this year.  She had a CT urogram at that time which did not  give any explanation for her right kidney pain that she has continued to have.  She has been on Azo daily which she was told by the urologist is fine to be doing.    History obtained from the patient.    Problem List:  2024-03: Dysuria  2023-11: History of obesity  2023-11: PMDD (premenstrual dysphoric disorder)  2023-05: Benign paroxysmal positional vertigo, right  2022-09: Binge eating disorder  2022-03: Telogen effluvium  2022-01: Family history of sudden cardiac death in brother  2022-01: Family history of sudden cardiac death in father  2021-08: Major depressive disorder, recurrent episode, mild (H24)  2021-08: Endometriosis  2021-08: Pain in joint, multiple sites  2021-07: Insulin resistance  2017-09: Psoriatic arthritis (H)  2017-09: Migraine with aura and without status migrainosus, not   intractable  2017-09: BMI 23.0-23.9, adult  2017-03: CARDIOVASCULAR SCREENING; LDL GOAL LESS THAN 160  2016-09: Pregnant      Past Medical History:   Diagnosis Date    Anxiety     Benign paroxysmal positional vertigo, right 5/5/2023    Depressive disorder 2005    Dysmenorrhea     Endometriosis     Joint pain     Mental disorder     anxiety    Migraine     Psoriatic arthritis (H)     Stillbirth of single fetus     32wks, breech       Social History     Tobacco Use    Smoking status: Never     Passive exposure: Past    Smokeless tobacco: Never   Substance Use Topics    Alcohol use: No         Review of Systems   Constitutional:  Negative for fever.   HENT:  Negative for congestion and sore throat.    Respiratory:  Positive for shortness of breath (associated with abdominal pressure, like she can't get a good deep breath). Negative for cough.    Cardiovascular:  Negative for chest pain.   Gastrointestinal:  Positive for abdominal pain (lower, pressure) and nausea. Negative for vomiting.        (+) more frequent bowel movements than normal. She is typically with hard stools and lately hers have been looser   Genitourinary:   Positive for frequency and urgency. Negative for dysuria.   Musculoskeletal:         (+) hands and feet swelling   Skin:  Negative for rash.   Neurological:  Positive for dizziness (not associated with head movements, comes out of the blue.) and light-headedness.       Vitals:    07/15/24 1542   BP: 120/81   Pulse: 109   Resp: 18   Temp: 98.5  F (36.9  C)   TempSrc: Oral   SpO2: 97%   Weight: 66.5 kg (146 lb 9.6 oz)       Physical Exam  Vitals and nursing note reviewed.   Constitutional:       General: She is not in acute distress.     Appearance: She is not toxic-appearing or diaphoretic.   HENT:      Head: Normocephalic and atraumatic.      Right Ear: External ear normal.      Left Ear: External ear normal.   Eyes:      Conjunctiva/sclera: Conjunctivae normal.   Cardiovascular:      Rate and Rhythm: Regular rhythm. Tachycardia present.      Heart sounds: No murmur heard.  Pulmonary:      Effort: Pulmonary effort is normal. No respiratory distress.      Breath sounds: Normal breath sounds. No stridor. No wheezing, rhonchi or rales.   Abdominal:      General: Abdomen is flat. There is no distension.      Palpations: Abdomen is soft.      Tenderness: There is abdominal tenderness. There is no guarding.   Neurological:      Mental Status: She is alert.   Psychiatric:         Mood and Affect: Mood normal.         Behavior: Behavior normal.         Thought Content: Thought content normal.         Judgment: Judgment normal.         Results:  Results for orders placed or performed in visit on 07/15/24   UA Macroscopic with reflex to Microscopic and Culture - Clinic Collect     Status: Abnormal    Specimen: Urine, Clean Catch   Result Value Ref Range    Color Urine Yellow Colorless, Straw, Light Yellow, Yellow    Appearance Urine Clear Clear    Glucose Urine Negative Negative mg/dL    Bilirubin Urine Negative Negative    Ketones Urine Negative Negative mg/dL    Specific Gravity Urine 1.010 1.005 - 1.030    Blood Urine  Trace (A) Negative    pH Urine 6.0 5.0 - 8.0    Protein Albumin Urine Negative Negative mg/dL    Urobilinogen Urine 0.2 0.2, 1.0 E.U./dL    Nitrite Urine Positive (A) Negative    Leukocyte Esterase Urine Negative Negative   D dimer, quantitative     Status: Normal   Result Value Ref Range    D-Dimer Quantitative 0.30 0.00 - 0.50 ug/mL FEU    Narrative    This D-dimer assay is intended for use in conjunction with a clinical pretest probability assessment model to exclude pulmonary embolism (PE) and deep venous thrombosis (DVT) in outpatients suspected of PE or DVT. The cut-off value is 0.50 ug/mL FEU.   Troponin T, High Sensitivity     Status: Normal   Result Value Ref Range    Troponin T, High Sensitivity <6 <=14 ng/L   BNP-N terminal pro     Status: Normal   Result Value Ref Range    N Terminal Pro BNP Outpatient <36 0 - 450 pg/mL   TSH with free T4 reflex     Status: Normal   Result Value Ref Range    TSH 0.73 0.30 - 4.20 uIU/mL   Comprehensive metabolic panel (BMP + Alb, Alk Phos, ALT, AST, Total. Bili, TP)     Status: Normal   Result Value Ref Range    Sodium 136 135 - 145 mmol/L    Potassium 4.0 3.4 - 5.3 mmol/L    Carbon Dioxide (CO2) 25 22 - 29 mmol/L    Anion Gap 9 7 - 15 mmol/L    Urea Nitrogen 8.3 6.0 - 20.0 mg/dL    Creatinine 0.87 0.51 - 0.95 mg/dL    GFR Estimate 88 >60 mL/min/1.73m2    Calcium 9.6 8.6 - 10.0 mg/dL    Chloride 102 98 - 107 mmol/L    Glucose 96 70 - 99 mg/dL    Alkaline Phosphatase 46 40 - 150 U/L    AST 24 0 - 45 U/L    ALT 15 0 - 50 U/L    Protein Total 7.2 6.4 - 8.3 g/dL    Albumin 4.3 3.5 - 5.2 g/dL    Bilirubin Total 0.3 <=1.2 mg/dL   CBC with platelets and differential     Status: None   Result Value Ref Range    WBC Count 7.2 4.0 - 11.0 10e3/uL    RBC Count 4.82 3.80 - 5.20 10e6/uL    Hemoglobin 13.3 11.7 - 15.7 g/dL    Hematocrit 41.4 35.0 - 47.0 %    MCV 86 78 - 100 fL    MCH 27.6 26.5 - 33.0 pg    MCHC 32.1 31.5 - 36.5 g/dL    RDW 12.8 10.0 - 15.0 %    Platelet Count 305 150 -  450 10e3/uL    % Neutrophils 45 %    % Lymphocytes 47 %    % Monocytes 6 %    % Eosinophils 1 %    % Basophils 1 %    % Immature Granulocytes 0 %    Absolute Neutrophils 3.2 1.6 - 8.3 10e3/uL    Absolute Lymphocytes 3.4 0.8 - 5.3 10e3/uL    Absolute Monocytes 0.4 0.0 - 1.3 10e3/uL    Absolute Eosinophils 0.1 0.0 - 0.7 10e3/uL    Absolute Basophils 0.0 0.0 - 0.2 10e3/uL    Absolute Immature Granulocytes 0.0 <=0.4 10e3/uL   Urine Microscopic Exam     Status: Abnormal   Result Value Ref Range    Bacteria Urine Few (A) None Seen /HPF    RBC Urine None Seen 0-2 /HPF /HPF    WBC Urine None Seen 0-5 /HPF /HPF    Squamous Epithelials Urine None Seen None Seen /LPF   EKG 12-lead, tracing only     Status: None   Result Value Ref Range    Systolic Blood Pressure  mmHg    Diastolic Blood Pressure  mmHg    Ventricular Rate 98 BPM    Atrial Rate 98 BPM    CT Interval 128 ms    QRS Duration 82 ms     ms    QTc 451 ms    P Axis 67 degrees    R AXIS 39 degrees    T Axis 58 degrees    Interpretation ECG       Sinus rhythm  Normal ECG  When compared with ECG of 10-FEB-2022 15:12,  No significant change was found  Confirmed by HALLIE PACHECO, LES LOC: ([id number]) on 7/15/2024 4:31:39 PM     CBC with platelets and differential     Status: None    Narrative    The following orders were created for panel order CBC with platelets and differential.  Procedure                               Abnormality         Status                     ---------                               -----------         ------                     CBC with platelets and d...[[id number]]                      Final result                 Please view results for these tests on the individual orders.       Referral to Acute and Diagnostic Services    [phone] ([place] [id number] [facility], [facility] [address], [place]  [address]    Transition to Acute & Diagnostic Services Clinic has been discussed with patient, and she agrees with next level of care.   Patient  understands that evaluation/treatment at [facility] typically takes significantly longer than in clinic/urgent care (>2 hours).  The [facility] Acute and Diagnostics Services Clinic has been contacted by provider/staff to confirm patient acceptance.         Special issues:      None                     The following provider has assessed this patient for intervention at [facility], and directed the patient for referral: Idania Coello PA-C              At the end of the encounter, I discussed results, diagnosis, medications. Discussed red flags for immediate return to clinic/ER, as well as indications for follow up if no improvement. Patient understood and agreed to plan. Patient was stable for discharge.

## 2024-07-15 NOTE — PATIENT INSTRUCTIONS
Your complete blood count is normal. Your EKG appears normal. Your urine test does not show any signs for infection at this time; we will be doing a urine culture to confirm the absence of infection.  We are waiting on the results of your D Dimer, BNP, comprehensive metabolic panel, thyroid hormone study, Lyme disease, and Troponin test. Please keep your phone on you today, so that I can let you know the D dimer test result once it's back. If the D dimer is elevated I would need to direct you to the emergency department. If it's normal I will recommend follow up with the Acute Diagnostic Services Clinic tomorrow at 10:30AM at the [facility] for further evaluation.

## 2024-07-16 ENCOUNTER — HOSPITAL ENCOUNTER (OUTPATIENT)
Dept: CT IMAGING | Facility: HOSPITAL | Age: 39
Discharge: HOME | End: 2024-07-16
Attending: FAMILY MEDICINE | Admitting: FAMILY MEDICINE
Payer: COMMERCIAL

## 2024-07-16 ENCOUNTER — OFFICE VISIT (OUTPATIENT)
Dept: PEDIATRICS | Facility: CLINIC | Age: 39
End: 2024-07-16
Payer: COMMERCIAL

## 2024-07-16 VITALS
SYSTOLIC BLOOD PRESSURE: 122 MMHG | RESPIRATION RATE: 19 BRPM | HEART RATE: 107 BPM | WEIGHT: 146.2 LBS | OXYGEN SATURATION: 100 % | DIASTOLIC BLOOD PRESSURE: 80 MMHG | BODY MASS INDEX: 23.24 KG/M2 | TEMPERATURE: 98.8 F

## 2024-07-16 DIAGNOSIS — R42 LIGHTHEADEDNESS: ICD-10-CM

## 2024-07-16 DIAGNOSIS — M79.89 BILATERAL SWELLING OF FEET: ICD-10-CM

## 2024-07-16 DIAGNOSIS — Z82.41 FAMILY HISTORY OF SUDDEN CARDIAC DEATH IN BROTHER: ICD-10-CM

## 2024-07-16 DIAGNOSIS — R06.02 SHORTNESS OF BREATH: ICD-10-CM

## 2024-07-16 DIAGNOSIS — R11.0 NAUSEA: ICD-10-CM

## 2024-07-16 DIAGNOSIS — L40.50 PSORIATIC ARTHRITIS (H): ICD-10-CM

## 2024-07-16 DIAGNOSIS — R10.84 ABDOMINAL PAIN, GENERALIZED: ICD-10-CM

## 2024-07-16 DIAGNOSIS — Z82.41 FAMILY HISTORY OF SUDDEN CARDIAC DEATH IN FATHER: ICD-10-CM

## 2024-07-16 DIAGNOSIS — R10.84 ABDOMINAL PAIN, GENERALIZED: Primary | ICD-10-CM

## 2024-07-16 LAB
B BURGDOR IGG+IGM SER QL: 0.3
CRP SERPL-MCNC: <3 MG/L
ERYTHROCYTE [SEDIMENTATION RATE] IN BLOOD BY WESTERGREN METHOD: 8 MM/HR (ref 0–20)

## 2024-07-16 PROCEDURE — 250N000009 HC RX 250: Performed by: FAMILY MEDICINE

## 2024-07-16 PROCEDURE — 71275 CT ANGIOGRAPHY CHEST: CPT

## 2024-07-16 PROCEDURE — 36415 COLL VENOUS BLD VENIPUNCTURE: CPT | Performed by: FAMILY MEDICINE

## 2024-07-16 PROCEDURE — 85652 RBC SED RATE AUTOMATED: CPT | Performed by: FAMILY MEDICINE

## 2024-07-16 PROCEDURE — 86140 C-REACTIVE PROTEIN: CPT | Performed by: FAMILY MEDICINE

## 2024-07-16 PROCEDURE — 250N000011 HC RX IP 250 OP 636: Performed by: FAMILY MEDICINE

## 2024-07-16 PROCEDURE — 87468 ANAPLSMA PHGCYTOPHLM AMP PRB: CPT | Performed by: FAMILY MEDICINE

## 2024-07-16 PROCEDURE — 87798 DETECT AGENT NOS DNA AMP: CPT | Mod: 59 | Performed by: FAMILY MEDICINE

## 2024-07-16 PROCEDURE — 99215 OFFICE O/P EST HI 40 MIN: CPT | Performed by: FAMILY MEDICINE

## 2024-07-16 RX ORDER — IOPAMIDOL 755 MG/ML
67 INJECTION, SOLUTION INTRAVASCULAR ONCE
Status: COMPLETED | OUTPATIENT
Start: 2024-07-16 | End: 2024-07-16

## 2024-07-16 RX ADMIN — SODIUM CHLORIDE 90 ML: 9 INJECTION, SOLUTION INTRAVENOUS at 13:10

## 2024-07-16 RX ADMIN — IOPAMIDOL 67 ML: 755 INJECTION, SOLUTION INTRAVENOUS at 13:09

## 2024-07-16 ASSESSMENT — PAIN SCALES - GENERAL: PAINLEVEL: SEVERE PAIN (6)

## 2024-07-16 NOTE — PROGRESS NOTES
Acute and Diagnostic Services Clinic Visit    Assessment & Plan     Abdominal pain, generalized  Blood work from yesterday reviewed and within normal limits  CT of chest abdomen pelvis with no acute findings  Recommended following up with urologist regarding bladder spasm  Lyme is negative.  Tickborne panel added today and pending  Echocardiogram scheduled for next Monday  - sodium chloride (PF) 0.9% PF flush 3 mL  - CT Chest PE Abdomen Pelvis w Contrast; Future  - Tick-Borne Disease Panel by PCR; Future  - Tick-Borne Disease Panel by PCR    Nausea  - CT Chest PE Abdomen Pelvis w Contrast; Future  - Tick-Borne Disease Panel by PCR; Future  - Tick-Borne Disease Panel by PCR    Lightheadedness  - CT Chest PE Abdomen Pelvis w Contrast; Future  - Echocardiogram Complete; Future  - Tick-Borne Disease Panel by PCR; Future  - Tick-Borne Disease Panel by PCR    Shortness of breath  - CT Chest PE Abdomen Pelvis w Contrast; Future  - Echocardiogram Complete; Future  - Tick-Borne Disease Panel by PCR; Future  - Tick-Borne Disease Panel by PCR    Bilateral swelling of feet  - CT Chest PE Abdomen Pelvis w Contrast; Future  - Echocardiogram Complete; Future  - Erythrocyte sedimentation rate auto; Future  - CRP inflammation; Future  - CRP inflammation  - Erythrocyte sedimentation rate auto    Psoriatic arthritis (H)  Continue Humira  Patient is not regularly taking her once weekly methotrexate  - CT Chest PE Abdomen Pelvis w Contrast; Future  - Erythrocyte sedimentation rate auto; Future  - CRP inflammation; Future  - CRP inflammation  - Erythrocyte sedimentation rate auto    Family history of sudden cardiac death in brother  Brother had an autopsy and had atherosclerosis.  No mention of hypertrophic cardiomyopathy  - CT Chest PE Abdomen Pelvis w Contrast; Future  - Echocardiogram Complete; Future    Family history of sudden cardiac death in father  Uncertain of etiology.  He was on a statin.  - CT Chest PE Abdomen Pelvis w  Contrast; Future  - Echocardiogram Complete; Future    Over 50  minutes were spent doing chart review, history and exam, documentation and further activities per the note.            No follow-ups on file.    Leslee Rasheed is a 38 year old, presenting for the following health issues:  Shortness of Breath    HPI     Patient is a 38-year-old female who has not been feeling well for couple of weeks.  She feels lightheaded and dizzy which comes and spells.  It is not positional.  She has nausea off-and-on but has not been vomiting.  She typically has a bowel movement every 3 to 4 days but recently it has been once a day and soft and loose.  No constipation.  Her hands and feet have been swelling off-and-on.  It is worse in the mornings or in the evenings.  Her rings are tighter and her feet have been painful and itchy.  She has persistent and constant right flank pain which has been worse recently.  She started having bladder issues in February with an infection that was difficult to treat.  She has then had bladder spasms since then.  She saw urology and had a CT urogram done in April which was normal.  She reports ongoing bladder spasms.    The last couple of days she has had sharp, shooting pain in her lower abdomen.  It is difficult to take a deep breath because of the pain and her lower abdomen feels full.  She is starting to feel short of breath.  Shortness of breath has been yesterday and today.  She has also had palpitations and elevated heart rate    Family history of sudden cardiac death in her paternal grandfather at age 55, her dad at age 58, and her brother at age 35.  Autopsy of her brother showed atherosclerosis.  Patient had a cardiac CT calcium score done March 2022 and it was 0.  She has not yet followed up to have an echocardiogram.    Patient's pulse rate has been elevated recently.  She held her Vyvanse over the weekend.  She was recently changed from generic to the brand name.  That did not  seem to change her symptoms.  She also held her Wellbutrin for a week and restarted it because it also did not make a difference in her symptoms.  She has had anxiety in the past and the symptoms do not feel like anxiety to her.    Review of Systems  negative except listed in HPI        Objective    /80 (BP Location: Right arm, Patient Position: Sitting, Cuff Size: Adult Regular)   Pulse 107   Temp 98.8  F (37.1  C) (Oral)   Resp 19   Wt 66.3 kg (146 lb 3.2 oz)   SpO2 100%   BMI 23.24 kg/m    Body mass index is 23.24 kg/m .  Physical Exam   Vitals noted and within normal limits.  Pulse of 107 here today  In general she is alert, oriented, and in no acute distress  Neck is supple with no cervical lymphadenopathy  Heart has a regular rate and rhythm with no murmurs  Lungs are clear to auscultation bilaterally with good air entry  Abdomen with normal bowel sounds.  Soft and nondistended.  She has abdominal pain to palpation across the lower abdomen and also in the right upper quadrant.  Positive guarding.  Radial and posterior tibial pulses are normal and equal bilaterally  No appreciable swelling in the hands or feet  Labs from yesterday reviewed and include normal D-dimer, thyroid, BNP, troponin.  CBC and CMP within normal limits.  Negative Lyme testing.  Normal urine microscopic with a urine culture pending.  Sed rate today 8  CRP negative  CT of chest, abdomen, pelvis: No acute findings  Results for orders placed or performed during the hospital encounter of 07/16/24   CT Chest PE Abdomen Pelvis w Contrast     Status: None    Narrative    EXAM: CT CHEST PE ABDOMEN PELVIS W CONTRAST  LOCATION: [facility]  DATE: 7/16/2024    INDICATION:  Family history of sudden cardiac death in brother, Family history of sudden cardiac death in father, Psoriatic arthritis (H), Abdominal pain, generalized, Nausea, Lightheadedness, Shortness of breath, Bilateral swelling of feet  COMPARISON: CT  coronary angiogram 3/9/2022  TECHNIQUE: CT chest pulmonary angiogram and routine CT abdomen pelvis with IV contrast. Arterial phase through the chest and venous phase through the abdomen and pelvis. Multiplanar reformats and MIP reconstructions were performed. Dose reduction   techniques were used.   CONTRAST: 67ml IV ISOVUE 370    FINDINGS:  ANGIOGRAM CHEST: Pulmonary arteries are normal caliber and negative for pulmonary emboli. Normal dimensions of the aortic root and preserved sinotubular junction. Normal contour of the thoracic aorta. Conventional arch anatomy and widely patent proximal   great vessels. No aortic atheromatous plaque.    LUNGS AND PLEURA: Symmetric normal lung inflation. No lung nodule, mass, or consolidation. No interlobular septal thickening or peribronchial thickening. The airways are patent and normal caliber. No pleural space abnormality.    MEDIASTINUM/AXILLAE: Cardiac chambers are normal in size. No pericardial effusion or thickening. Fat replaced thymus in the anterior mediastinum, normal for age. No lymphadenopathy. Granulomatous calcifications are present in a few lymph nodes in the   left hilum, not requiring additional workup or follow-up. The esophagus is decompressed. Imaged thyroid gland is normal.    CORONARY ARTERY CALCIFICATION: None.    HEPATOBILIARY: Normal.    PANCREAS: Normal.    SPLEEN: Normal.    ADRENAL GLANDS: Normal.    KIDNEYS/BLADDER: Normal.    BOWEL: Normal.    LYMPH NODES: Normal.    VASCULATURE: Normal caliber abdominal aorta and iliac arteries.    PELVIC ORGANS: Physiologic free fluid in the posterior cul-de-sac. There is a dominant physiologic follicle in the left ovary measuring 16 mm in diameter. Normal uterus.    MUSCULOSKELETAL: Normal.      Impression    IMPRESSION:    1.  No acute inflammatory process in the chest.  2.  Normal caliber main pulmonary artery thoracoabdominal aorta. No acute pulmonary embolism or other vasculopathy.  3.  Normal CT of the  abdomen and pelvis.   Results for orders placed or performed in visit on 07/16/24   CRP inflammation     Status: Normal   Result Value Ref Range    CRP Inflammation <3.00 <5.00 mg/L   Erythrocyte sedimentation rate auto     Status: Normal   Result Value Ref Range    Erythrocyte Sedimentation Rate 8 0 - 20 mm/hr                 Signed Electronically by: Marilyn Gerber MD

## 2024-07-16 NOTE — Clinical Note
DENZEL-patient seen at urgent care yesterday and at [facility] today.  No specific cause found for her ongoing symptoms.  Echocardiogram is scheduled for next Monday.  Would love for her to be seen in follow-up in a couple of weeks for recheck.Thank you!

## 2024-07-17 ENCOUNTER — TELEPHONE (OUTPATIENT)
Dept: FAMILY MEDICINE | Facility: CLINIC | Age: 39
End: 2024-07-17
Payer: COMMERCIAL

## 2024-07-17 LAB
A PHAGOCYTOPH DNA BLD QL NAA+PROBE: NOT DETECTED
BABESIA DNA BLD QL NAA+PROBE: NOT DETECTED
BACTERIA UR CULT: NORMAL
EHRLICHIA DNA SPEC QL NAA+PROBE: NOT DETECTED

## 2024-07-17 NOTE — TELEPHONE ENCOUNTER
Rob Cooper MD at 7/16/2024 10:30 AM    Status: Signed   Can she be scheduled with any provider in clinic in the next week or 2 as follow-up from ADS evaluation?          Please call for appointment as stated above   *-*-*    Note contains history of violence and/or admission due to dangerousness to others    *-*-*

## 2024-07-17 NOTE — PROGRESS NOTES
Can she be scheduled with any provider in clinic in the next week or 2 as follow-up from ADS evaluation?

## 2024-07-22 ENCOUNTER — HOSPITAL ENCOUNTER (OUTPATIENT)
Dept: CARDIOLOGY | Facility: HOSPITAL | Age: 39
Discharge: HOME | End: 2024-07-22
Attending: FAMILY MEDICINE | Admitting: FAMILY MEDICINE
Payer: COMMERCIAL

## 2024-07-22 DIAGNOSIS — M79.89 BILATERAL SWELLING OF FEET: ICD-10-CM

## 2024-07-22 DIAGNOSIS — R06.02 SHORTNESS OF BREATH: ICD-10-CM

## 2024-07-22 DIAGNOSIS — R42 LIGHTHEADEDNESS: ICD-10-CM

## 2024-07-22 DIAGNOSIS — Z82.41 FAMILY HISTORY OF SUDDEN CARDIAC DEATH IN BROTHER: ICD-10-CM

## 2024-07-22 DIAGNOSIS — Z82.41 FAMILY HISTORY OF SUDDEN CARDIAC DEATH IN FATHER: ICD-10-CM

## 2024-07-22 LAB — LVEF ECHO: NORMAL

## 2024-07-22 PROCEDURE — 93306 TTE W/DOPPLER COMPLETE: CPT | Mod: 26 | Performed by: INTERNAL MEDICINE

## 2024-07-22 PROCEDURE — 93306 TTE W/DOPPLER COMPLETE: CPT

## 2024-08-02 ENCOUNTER — MYC REFILL (OUTPATIENT)
Dept: FAMILY MEDICINE | Facility: CLINIC | Age: 39
End: 2024-08-02
Payer: COMMERCIAL

## 2024-08-02 DIAGNOSIS — F50.819 BINGE EATING DISORDER: ICD-10-CM

## 2024-08-05 RX ORDER — LISDEXAMFETAMINE DIMESYLATE 60 MG/1
60 CAPSULE ORAL EVERY MORNING
Qty: 30 CAPSULE | Refills: 0 | Status: SHIPPED | OUTPATIENT
Start: 2024-08-05 | End: 2024-09-04

## 2024-08-08 ASSESSMENT — ANXIETY QUESTIONNAIRES
6. BECOMING EASILY ANNOYED OR IRRITABLE: MORE THAN HALF THE DAYS
4. TROUBLE RELAXING: NEARLY EVERY DAY
7. FEELING AFRAID AS IF SOMETHING AWFUL MIGHT HAPPEN: NOT AT ALL
2. NOT BEING ABLE TO STOP OR CONTROL WORRYING: NEARLY EVERY DAY
3. WORRYING TOO MUCH ABOUT DIFFERENT THINGS: MORE THAN HALF THE DAYS
GAD7 TOTAL SCORE: 14
1. FEELING NERVOUS, ANXIOUS, OR ON EDGE: MORE THAN HALF THE DAYS
8. IF YOU CHECKED OFF ANY PROBLEMS, HOW DIFFICULT HAVE THESE MADE IT FOR YOU TO DO YOUR WORK, TAKE CARE OF THINGS AT HOME, OR GET ALONG WITH OTHER PEOPLE?: VERY DIFFICULT
GAD7 TOTAL SCORE: 14
7. FEELING AFRAID AS IF SOMETHING AWFUL MIGHT HAPPEN: NOT AT ALL
IF YOU CHECKED OFF ANY PROBLEMS ON THIS QUESTIONNAIRE, HOW DIFFICULT HAVE THESE PROBLEMS MADE IT FOR YOU TO DO YOUR WORK, TAKE CARE OF THINGS AT HOME, OR GET ALONG WITH OTHER PEOPLE: VERY DIFFICULT
5. BEING SO RESTLESS THAT IT IS HARD TO SIT STILL: MORE THAN HALF THE DAYS
GAD7 TOTAL SCORE: 14

## 2024-08-09 ENCOUNTER — OFFICE VISIT (OUTPATIENT)
Dept: FAMILY MEDICINE | Facility: CLINIC | Age: 39
End: 2024-08-09
Payer: COMMERCIAL

## 2024-08-09 VITALS
DIASTOLIC BLOOD PRESSURE: 71 MMHG | HEIGHT: 67 IN | TEMPERATURE: 97.5 F | SYSTOLIC BLOOD PRESSURE: 106 MMHG | HEART RATE: 76 BPM | RESPIRATION RATE: 16 BRPM | WEIGHT: 148.4 LBS | BODY MASS INDEX: 23.29 KG/M2 | OXYGEN SATURATION: 99 %

## 2024-08-09 DIAGNOSIS — F33.0 MAJOR DEPRESSIVE DISORDER, RECURRENT EPISODE, MILD (H): ICD-10-CM

## 2024-08-09 DIAGNOSIS — F50.819 BINGE EATING DISORDER: Primary | ICD-10-CM

## 2024-08-09 PROCEDURE — 99214 OFFICE O/P EST MOD 30 MIN: CPT | Performed by: FAMILY MEDICINE

## 2024-08-09 PROCEDURE — G2211 COMPLEX E/M VISIT ADD ON: HCPCS | Performed by: FAMILY MEDICINE

## 2024-08-09 PROCEDURE — 96127 BRIEF EMOTIONAL/BEHAV ASSMT: CPT | Performed by: FAMILY MEDICINE

## 2024-08-09 RX ORDER — CLONAZEPAM 0.5 MG/1
0.5 TABLET, ORALLY DISINTEGRATING ORAL
Qty: 20 TABLET | Refills: 0 | Status: SHIPPED | OUTPATIENT
Start: 2024-08-09

## 2024-08-09 RX ORDER — SYRINGE, DISPOSABLE, 1 ML
SYRINGE, EMPTY DISPOSABLE MISCELLANEOUS
COMMUNITY
Start: 2024-07-31

## 2024-08-09 NOTE — ASSESSMENT & PLAN NOTE
"Mood overall stable.  She went off of both escitalopram and bupropion during the summer months and had a \"crash.\"  Now that she is back on therapy she is doing better.  Stress remains high.  She lives with chronic pain is managed by her team.  I will continue her current therapy.  Referral placed for mental health support (EMDR?  DBT?).  Recheck recommended in 3 to 6 months.   - The syndrome that she had in June and July has mostly resolved.  Unclear etiology.  "

## 2024-08-09 NOTE — PROGRESS NOTES
"  Assessment & Plan   Problem List Items Addressed This Visit       Binge eating disorder - Primary     Vyvanse continues to help with Binging.  Weight is stable.           BMI 23.0-23.9, adult     Weight stable.  Binging under control with the assistance of lisdexamfetamine.         Major depressive disorder, recurrent episode, mild (H24)     Mood overall stable.  She went off of both escitalopram and bupropion during the summer months and had a \"crash.\"  Now that she is back on therapy she is doing better.  Stress remains high.  She lives with chronic pain is managed by her team.  I will continue her current therapy.  Referral placed for mental health support (EMDR?  DBT?).  Recheck recommended in 3 to 6 months.   - The syndrome that she had in June and July has mostly resolved.  Unclear etiology.         Relevant Medications    clonazePAM (KLONOPIN) 0.5 MG ODT    Other Relevant Orders    Adult Mental Health  Referral      The longitudinal plan of care for the diagnosis(es)/condition(s) as documented were addressed during this visit. Due to the added complexity in care, I will continue to support Kathya in the subsequent management and with ongoing continuity of care.       Leslee Rasheed is a 38 year old, presenting for the following health issues:  RECHECK and Follow up from Urgent care visit x7/15/ and 7/16 8/9/2024    10:22 AM   Additional Questions   Roomed by xl   Accompanied by Daughter     Symptoms from July mostly resolved.  She has swelling, breathing difficulties, tachycardia.    Continues to have bladder spasms.  Azo helps.    Mental health:   - prior to July, she had been feeling really good. She was off wellbutrin and lexapro for a few weeks and then crashed. She is  back on these medications.      History of Present Illness       Reason for visit:  Swelling, tachycardia, bladder spasms    She eats 2-3 servings of fruits and vegetables daily.She consumes 0 sweetened beverage(s) " "daily.She exercises with enough effort to increase her heart rate 10 to 19 minutes per day.  She exercises with enough effort to increase her heart rate 3 or less days per week. She is missing 3 dose(s) of medications per week.  She is not taking prescribed medications regularly due to other.        Objective    /71 (BP Location: Left arm, Patient Position: Sitting, Cuff Size: Adult Regular)   Pulse 76   Temp 97.5  F (36.4  C) (Oral)   Resp 16   Ht 1.689 m (5' 6.5\")   Wt 67.3 kg (148 lb 6.4 oz)   LMP 07/20/2024   SpO2 99%   BMI 23.59 kg/m    Body mass index is 23.59 kg/m .  Physical Exam  Nursing note reviewed.   Constitutional:       General: She is not in acute distress.     Appearance: Normal appearance. She is not ill-appearing.   HENT:      Head: Normocephalic and atraumatic.   Eyes:      Extraocular Movements: Extraocular movements intact.      Conjunctiva/sclera: Conjunctivae normal.   Pulmonary:      Effort: Pulmonary effort is normal.   Neurological:      Mental Status: She is alert and oriented to person, place, and time.   Psychiatric:         Attention and Perception: Attention normal.         Mood and Affect: Mood normal.         Speech: Speech normal.         Thought Content: Thought content normal.                  Signed Electronically by: Rob Cooper MD    "

## 2024-09-04 ENCOUNTER — MYC REFILL (OUTPATIENT)
Dept: FAMILY MEDICINE | Facility: CLINIC | Age: 39
End: 2024-09-04
Payer: COMMERCIAL

## 2024-09-04 DIAGNOSIS — F50.819 BINGE EATING DISORDER: ICD-10-CM

## 2024-09-05 RX ORDER — LISDEXAMFETAMINE DIMESYLATE 60 MG/1
60 CAPSULE ORAL EVERY MORNING
Qty: 30 CAPSULE | Refills: 0 | Status: SHIPPED | OUTPATIENT
Start: 2024-09-05

## 2024-10-08 ENCOUNTER — OFFICE VISIT (OUTPATIENT)
Dept: FAMILY MEDICINE | Facility: CLINIC | Age: 39
End: 2024-10-08
Payer: COMMERCIAL

## 2024-10-08 VITALS
TEMPERATURE: 98 F | DIASTOLIC BLOOD PRESSURE: 84 MMHG | RESPIRATION RATE: 16 BRPM | SYSTOLIC BLOOD PRESSURE: 120 MMHG | HEART RATE: 75 BPM | OXYGEN SATURATION: 100 %

## 2024-10-08 DIAGNOSIS — R39.89 URINARY PROBLEM: Primary | ICD-10-CM

## 2024-10-08 DIAGNOSIS — L40.50 PSORIATIC ARTHRITIS (H): ICD-10-CM

## 2024-10-08 DIAGNOSIS — D84.9 IMMUNOSUPPRESSED STATUS (H): ICD-10-CM

## 2024-10-08 DIAGNOSIS — Z11.3 SCREEN FOR STD (SEXUALLY TRANSMITTED DISEASE): ICD-10-CM

## 2024-10-08 LAB
ALBUMIN UR-MCNC: NEGATIVE MG/DL
APPEARANCE UR: CLEAR
BACTERIA #/AREA URNS HPF: ABNORMAL /HPF
BASOPHILS # BLD AUTO: 0.1 10E3/UL (ref 0–0.2)
BASOPHILS NFR BLD AUTO: 1 %
BILIRUB UR QL STRIP: NEGATIVE
COLOR UR AUTO: ABNORMAL
EOSINOPHIL # BLD AUTO: 0.6 10E3/UL (ref 0–0.7)
EOSINOPHIL NFR BLD AUTO: 7 %
ERYTHROCYTE [DISTWIDTH] IN BLOOD BY AUTOMATED COUNT: 14 % (ref 10–15)
GLUCOSE UR STRIP-MCNC: NEGATIVE MG/DL
HCT VFR BLD AUTO: 40.9 % (ref 35–47)
HGB BLD-MCNC: 13 G/DL (ref 11.7–15.7)
HGB UR QL STRIP: ABNORMAL
IMM GRANULOCYTES # BLD: 0 10E3/UL
IMM GRANULOCYTES NFR BLD: 0 %
KETONES UR STRIP-MCNC: NEGATIVE MG/DL
LEUKOCYTE ESTERASE UR QL STRIP: ABNORMAL
LYMPHOCYTES # BLD AUTO: 3.4 10E3/UL (ref 0.8–5.3)
LYMPHOCYTES NFR BLD AUTO: 40 %
MCH RBC QN AUTO: 27.5 PG (ref 26.5–33)
MCHC RBC AUTO-ENTMCNC: 31.8 G/DL (ref 31.5–36.5)
MCV RBC AUTO: 87 FL (ref 78–100)
MONOCYTES # BLD AUTO: 0.7 10E3/UL (ref 0–1.3)
MONOCYTES NFR BLD AUTO: 8 %
NEUTROPHILS # BLD AUTO: 3.7 10E3/UL (ref 1.6–8.3)
NEUTROPHILS NFR BLD AUTO: 44 %
NITRATE UR QL: NEGATIVE
PH UR STRIP: 6.5 [PH] (ref 5–8)
PLATELET # BLD AUTO: 386 10E3/UL (ref 150–450)
RBC # BLD AUTO: 4.73 10E6/UL (ref 3.8–5.2)
RBC #/AREA URNS AUTO: ABNORMAL /HPF
SP GR UR STRIP: 1.01 (ref 1–1.03)
SQUAMOUS #/AREA URNS AUTO: ABNORMAL /LPF
UROBILINOGEN UR STRIP-ACNC: 0.2 E.U./DL
WBC # BLD AUTO: 8.5 10E3/UL (ref 4–11)
WBC #/AREA URNS AUTO: ABNORMAL /HPF

## 2024-10-08 PROCEDURE — 99214 OFFICE O/P EST MOD 30 MIN: CPT | Performed by: PHYSICIAN ASSISTANT

## 2024-10-08 PROCEDURE — 36415 COLL VENOUS BLD VENIPUNCTURE: CPT | Performed by: PHYSICIAN ASSISTANT

## 2024-10-08 PROCEDURE — 87491 CHLMYD TRACH DNA AMP PROBE: CPT | Performed by: PHYSICIAN ASSISTANT

## 2024-10-08 PROCEDURE — 85025 COMPLETE CBC W/AUTO DIFF WBC: CPT | Performed by: PHYSICIAN ASSISTANT

## 2024-10-08 PROCEDURE — 81001 URINALYSIS AUTO W/SCOPE: CPT | Performed by: PHYSICIAN ASSISTANT

## 2024-10-08 PROCEDURE — 87591 N.GONORRHOEAE DNA AMP PROB: CPT | Performed by: PHYSICIAN ASSISTANT

## 2024-10-08 NOTE — PROGRESS NOTES
Patient presents with:  Urinary Problem: Bladder pressure 2-3 weeks right flank pain  lymph node inguinal swollen       Clinical Decision Making:  Urinalysis did not show urinary tract infection.  I had a long discussion regarding the patient's prior history and workup for her concerns.  Gonorrhea and Chlamydia testing are pending.  Should be contacted with appropriate treatment if positive.  Will monitor the test results in real-time in the patient portal.  Referral to gynecology for her pelvic pain pressure and concern for interstitial cystitis and endometriosis.    30 min spent on the date of the encounter in chart review, patient visit, review of tests, documentation and/or discussion with other providers about the issues documented above.        ICD-10-CM    1. Urinary problem  R39.89 UA Macroscopic with reflex to Microscopic and Culture - Clinic Collect     UA Microscopic with Reflex to Culture     Ob/Gyn  Referral      2. Screen for STD (sexually transmitted disease)  Z11.3 Chlamydia trachomatis/Neisseria gonorrhoeae by PCR - Clinic Collect     CBC with platelets and differential      3. Psoriatic arthritis (H)  L40.50       4. Immunosuppressed status (H)  D84.9           Patient Instructions   Follow up with OB/GYN for your pelvic pain and pressure.    Urinalysis was negative.    Abstain from sexual intercourse until your testing is returned.  Use condoms and barrier method to help prevent future STD exposures.  Follow-up with your primary care provider for reevaluation of your symptoms and interpretation of your labs for STD screening.         HPI:  Kathya Chino is a 38 year old female who has a past medical history for psoriatic arthritis and self-reported history of endometriosis who was seen by myself in clinic 6 months ago in February for urinary tract infection.  Patient states that she has never really fully resolved her urinary symptoms.  She feels that she has some pelvic pressure and  cramping.  Currently she has not had vaginal discharge, pelvic pain, new dyspareunia flank pain or gross hematuria.  She shares that she has had a history of endometriosis.  Has undergone evaluation by urology and had been diagnosed with Klebsiella pneumonia in the urine and treated with levofloxacin.  There was a concern that there could be some scarring on the bladder that could cause interstitial cystitis.  Currently the patient has a new sexual partner.  She does not have any vaginal lesions or vaginal discharge but will have screening for gonorrhea and chlamydia.  No known exposure for STDs.    Currently the patient states she is not having hesitancy urgency frequency dysuria or gross hematuria.    History obtained from chart review and the patient.    Problem List:  2024-03: Dysuria  2023-11: History of obesity  2023-11: PMDD (premenstrual dysphoric disorder)  2023-05: Benign paroxysmal positional vertigo, right  2022-09: Binge eating disorder  2022-03: Telogen effluvium  2022-01: Family history of sudden cardiac death in brother  2022-01: Family history of sudden cardiac death in father  2021-08: Major depressive disorder, recurrent episode, mild (H)  2021-08: Endometriosis  2021-08: Pain in joint, multiple sites  2021-07: Insulin resistance  2017-09: Psoriatic arthritis (H)  2017-09: Migraine with aura and without status migrainosus, not   intractable  2017-09: BMI 23.0-23.9, adult  2017-03: CARDIOVASCULAR SCREENING; LDL GOAL LESS THAN 160  2016-09: Pregnant      Past Medical History:   Diagnosis Date    Anxiety     Benign paroxysmal positional vertigo, right 5/5/2023    Depressive disorder 2005    Dysmenorrhea     Endometriosis     Joint pain     Mental disorder     anxiety    Migraine     Psoriatic arthritis (H)     Stillbirth of single fetus     32wks, breech       Social History     Tobacco Use    Smoking status: Never     Passive exposure: Past    Smokeless tobacco: Never   Substance Use Topics     Alcohol use: No       Review of Systems  As above in HPI otherwise negative.    Vitals:    10/08/24 1154   BP: 120/84   Pulse: 75   Resp: 16   Temp: 98  F (36.7  C)   TempSrc: Oral   SpO2: 100%       General: Patient is resting comfortably no acute distress is afebrile  HEENT: Head is normocephalic atraumatic   eyes are PERRL EOMI sclera anicteric   LUNGS: Clear to auscultation bilaterally  HEART: Regular rate and rhythm  Abdomen: Soft nontender nondistended no rebound or guarding no masses slight suprapubic discomfort to palpation but no induration.  No CVA tenderness to percussion.  Skin: Without rash non-diaphoretic    Physical Exam      Labs:  Results for orders placed or performed in visit on 10/08/24   UA Macroscopic with reflex to Microscopic and Culture - Clinic Collect     Status: Abnormal    Specimen: Urine, Clean Catch   Result Value Ref Range    Color Urine Light Yellow Colorless, Straw, Light Yellow, Yellow    Appearance Urine Clear Clear    Glucose Urine Negative Negative mg/dL    Bilirubin Urine Negative Negative    Ketones Urine Negative Negative mg/dL    Specific Gravity Urine 1.010 1.005 - 1.030    Blood Urine Trace (A) Negative    pH Urine 6.5 5.0 - 8.0    Protein Albumin Urine Negative Negative mg/dL    Urobilinogen Urine 0.2 0.2, 1.0 E.U./dL    Nitrite Urine Negative Negative    Leukocyte Esterase Urine Trace (A) Negative   UA Microscopic with Reflex to Culture     Status: Abnormal   Result Value Ref Range    Bacteria Urine Few (A) None Seen /HPF    RBC Urine 0-2 0-2 /HPF /HPF    WBC Urine 0-5 0-5 /HPF /HPF    Squamous Epithelials Urine Few (A) None Seen /LPF    Narrative    Urine Culture not indicated   CBC with platelets and differential     Status: None   Result Value Ref Range    WBC Count 8.5 4.0 - 11.0 10e3/uL    RBC Count 4.73 3.80 - 5.20 10e6/uL    Hemoglobin 13.0 11.7 - 15.7 g/dL    Hematocrit 40.9 35.0 - 47.0 %    MCV 87 78 - 100 fL    MCH 27.5 26.5 - 33.0 pg    MCHC 31.8 31.5 - 36.5  g/dL    RDW 14.0 10.0 - 15.0 %    Platelet Count 386 150 - 450 10e3/uL    % Neutrophils 44 %    % Lymphocytes 40 %    % Monocytes 8 %    % Eosinophils 7 %    % Basophils 1 %    % Immature Granulocytes 0 %    Absolute Neutrophils 3.7 1.6 - 8.3 10e3/uL    Absolute Lymphocytes 3.4 0.8 - 5.3 10e3/uL    Absolute Monocytes 0.7 0.0 - 1.3 10e3/uL    Absolute Eosinophils 0.6 0.0 - 0.7 10e3/uL    Absolute Basophils 0.1 0.0 - 0.2 10e3/uL    Absolute Immature Granulocytes 0.0 <=0.4 10e3/uL   CBC with platelets and differential     Status: None    Narrative    The following orders were created for panel order CBC with platelets and differential.  Procedure                               Abnormality         Status                     ---------                               -----------         ------                     CBC with platelets and d...[[id number]]                      Final result                 Please view results for these tests on the individual orders.       At the end of the encounter, I discussed results, diagnosis, medications. Discussed red flags for immediate return to clinic/ER, as well as indications for follow up if no improvement. Patient understood and agreed to plan. Patient was stable for discharge.

## 2024-10-08 NOTE — PATIENT INSTRUCTIONS
Follow up with OB/GYN for your pelvic pain and pressure.    Urinalysis was negative.    Abstain from sexual intercourse until your testing is returned.  Use condoms and barrier method to help prevent future STD exposures.  Follow-up with your primary care provider for reevaluation of your symptoms and interpretation of your labs for STD screening.

## 2024-10-09 LAB
C TRACH DNA SPEC QL PROBE+SIG AMP: NEGATIVE
N GONORRHOEA DNA SPEC QL NAA+PROBE: NEGATIVE

## 2024-10-11 ENCOUNTER — MYC REFILL (OUTPATIENT)
Dept: FAMILY MEDICINE | Facility: CLINIC | Age: 39
End: 2024-10-11
Payer: COMMERCIAL

## 2024-10-11 DIAGNOSIS — F50.819 BINGE EATING DISORDER: ICD-10-CM

## 2024-10-11 DIAGNOSIS — F50.811 MODERATE BINGE-EATING DISORDER: Primary | ICD-10-CM

## 2024-10-11 RX ORDER — LISDEXAMFETAMINE DIMESYLATE 60 MG/1
60 CAPSULE ORAL EVERY MORNING
Qty: 30 CAPSULE | Refills: 0 | Status: SHIPPED | OUTPATIENT
Start: 2024-10-11

## 2024-10-31 ENCOUNTER — TRANSFERRED RECORDS (OUTPATIENT)
Dept: HEALTH INFORMATION MANAGEMENT | Facility: CLINIC | Age: 39
End: 2024-10-31
Payer: COMMERCIAL

## 2024-10-31 LAB
ALT SERPL-CCNC: 9 IU/L (ref 6–32)
AST SERPL-CCNC: 17 U/L (ref 10–35)
CREATININE (EXTERNAL): 0.9 MG/DL (ref 0.5–1.05)
HEP C HIM: NORMAL

## 2024-11-12 ENCOUNTER — MYC REFILL (OUTPATIENT)
Dept: FAMILY MEDICINE | Facility: CLINIC | Age: 39
End: 2024-11-12
Payer: COMMERCIAL

## 2024-11-12 DIAGNOSIS — F50.811 MODERATE BINGE-EATING DISORDER: ICD-10-CM

## 2024-11-14 RX ORDER — LISDEXAMFETAMINE DIMESYLATE 60 MG/1
60 CAPSULE ORAL EVERY MORNING
Qty: 30 CAPSULE | Refills: 0 | Status: SHIPPED | OUTPATIENT
Start: 2024-11-14

## 2024-12-17 ENCOUNTER — MYC REFILL (OUTPATIENT)
Dept: FAMILY MEDICINE | Facility: CLINIC | Age: 39
End: 2024-12-17
Payer: COMMERCIAL

## 2024-12-17 DIAGNOSIS — F50.811 MODERATE BINGE-EATING DISORDER: ICD-10-CM

## 2024-12-17 RX ORDER — LISDEXAMFETAMINE DIMESYLATE 60 MG/1
60 CAPSULE ORAL EVERY MORNING
Qty: 30 CAPSULE | Refills: 0 | Status: SHIPPED | OUTPATIENT
Start: 2024-12-17

## 2025-01-12 ENCOUNTER — HEALTH MAINTENANCE LETTER (OUTPATIENT)
Age: 40
End: 2025-01-12

## 2025-01-14 ENCOUNTER — MYC REFILL (OUTPATIENT)
Dept: FAMILY MEDICINE | Facility: CLINIC | Age: 40
End: 2025-01-14
Payer: COMMERCIAL

## 2025-01-14 DIAGNOSIS — F50.811 MODERATE BINGE-EATING DISORDER: ICD-10-CM

## 2025-01-14 RX ORDER — LISDEXAMFETAMINE DIMESYLATE 60 MG/1
60 CAPSULE ORAL EVERY MORNING
Qty: 30 CAPSULE | Refills: 0 | Status: SHIPPED | OUTPATIENT
Start: 2025-01-14

## 2025-02-25 ENCOUNTER — OFFICE VISIT (OUTPATIENT)
Dept: FAMILY MEDICINE | Facility: CLINIC | Age: 40
End: 2025-02-25
Payer: COMMERCIAL

## 2025-02-25 VITALS
OXYGEN SATURATION: 100 % | WEIGHT: 131.56 LBS | SYSTOLIC BLOOD PRESSURE: 134 MMHG | TEMPERATURE: 98.9 F | RESPIRATION RATE: 12 BRPM | HEART RATE: 90 BPM | DIASTOLIC BLOOD PRESSURE: 84 MMHG | BODY MASS INDEX: 20.65 KG/M2 | HEIGHT: 67 IN

## 2025-02-25 DIAGNOSIS — F33.1 MODERATE RECURRENT MAJOR DEPRESSION (H): ICD-10-CM

## 2025-02-25 DIAGNOSIS — Z12.4 SCREENING FOR CERVICAL CANCER: ICD-10-CM

## 2025-02-25 DIAGNOSIS — Z11.3 ROUTINE SCREENING FOR STI (SEXUALLY TRANSMITTED INFECTION): ICD-10-CM

## 2025-02-25 DIAGNOSIS — L40.50 PSORIATIC ARTHRITIS (H): ICD-10-CM

## 2025-02-25 DIAGNOSIS — Z13.0 SCREENING FOR DEFICIENCY ANEMIA: ICD-10-CM

## 2025-02-25 DIAGNOSIS — F33.0 MAJOR DEPRESSIVE DISORDER, RECURRENT EPISODE, MILD: ICD-10-CM

## 2025-02-25 DIAGNOSIS — N80.9 ENDOMETRIOSIS: ICD-10-CM

## 2025-02-25 DIAGNOSIS — K64.5 THROMBOSED EXTERNAL HEMORRHOIDS: ICD-10-CM

## 2025-02-25 DIAGNOSIS — Z13.21 ENCOUNTER FOR VITAMIN DEFICIENCY SCREENING: ICD-10-CM

## 2025-02-25 DIAGNOSIS — Z13.228 SCREENING FOR METABOLIC DISORDER: ICD-10-CM

## 2025-02-25 DIAGNOSIS — R39.82 CHRONIC BLADDER PAIN: ICD-10-CM

## 2025-02-25 DIAGNOSIS — Z00.00 ROUTINE GENERAL MEDICAL EXAMINATION AT A HEALTH CARE FACILITY: Primary | ICD-10-CM

## 2025-02-25 DIAGNOSIS — Z82.41 FAMILY HISTORY OF SUDDEN CARDIAC DEATH IN BROTHER: ICD-10-CM

## 2025-02-25 DIAGNOSIS — R30.0 DYSURIA: ICD-10-CM

## 2025-02-25 DIAGNOSIS — F50.811 MODERATE BINGE-EATING DISORDER: ICD-10-CM

## 2025-02-25 LAB
ALBUMIN UR-MCNC: NEGATIVE MG/DL
APPEARANCE UR: CLEAR
BACTERIA #/AREA URNS HPF: ABNORMAL /HPF
BILIRUB UR QL STRIP: NEGATIVE
COLOR UR AUTO: YELLOW
ERYTHROCYTE [DISTWIDTH] IN BLOOD BY AUTOMATED COUNT: 14.4 % (ref 10–15)
GLUCOSE UR STRIP-MCNC: NEGATIVE MG/DL
HCT VFR BLD AUTO: 35.8 % (ref 35–47)
HGB BLD-MCNC: 11.7 G/DL (ref 11.7–15.7)
HGB UR QL STRIP: ABNORMAL
HOLD SPECIMEN: NORMAL
KETONES UR STRIP-MCNC: 40 MG/DL
LEUKOCYTE ESTERASE UR QL STRIP: NEGATIVE
MCH RBC QN AUTO: 27.1 PG (ref 26.5–33)
MCHC RBC AUTO-ENTMCNC: 32.7 G/DL (ref 31.5–36.5)
MCV RBC AUTO: 83 FL (ref 78–100)
MUCOUS THREADS #/AREA URNS LPF: PRESENT /LPF
NITRATE UR QL: NEGATIVE
PH UR STRIP: 6 [PH] (ref 5–8)
PLATELET # BLD AUTO: 253 10E3/UL (ref 150–450)
RBC # BLD AUTO: 4.31 10E6/UL (ref 3.8–5.2)
RBC #/AREA URNS AUTO: ABNORMAL /HPF
SP GR UR STRIP: 1.02 (ref 1–1.03)
SQUAMOUS #/AREA URNS AUTO: ABNORMAL /LPF
UROBILINOGEN UR STRIP-ACNC: 0.2 E.U./DL
WBC # BLD AUTO: 9.7 10E3/UL (ref 4–11)
WBC #/AREA URNS AUTO: ABNORMAL /HPF

## 2025-02-25 PROCEDURE — 99395 PREV VISIT EST AGE 18-39: CPT

## 2025-02-25 PROCEDURE — 85027 COMPLETE CBC AUTOMATED: CPT

## 2025-02-25 PROCEDURE — 3079F DIAST BP 80-89 MM HG: CPT

## 2025-02-25 PROCEDURE — 82306 VITAMIN D 25 HYDROXY: CPT

## 2025-02-25 PROCEDURE — 82728 ASSAY OF FERRITIN: CPT

## 2025-02-25 PROCEDURE — 36415 COLL VENOUS BLD VENIPUNCTURE: CPT

## 2025-02-25 PROCEDURE — 83540 ASSAY OF IRON: CPT

## 2025-02-25 PROCEDURE — 87491 CHLMYD TRACH DNA AMP PROBE: CPT

## 2025-02-25 PROCEDURE — 87591 N.GONORRHOEAE DNA AMP PROB: CPT

## 2025-02-25 PROCEDURE — 99214 OFFICE O/P EST MOD 30 MIN: CPT | Mod: 25

## 2025-02-25 PROCEDURE — 87086 URINE CULTURE/COLONY COUNT: CPT

## 2025-02-25 PROCEDURE — 86780 TREPONEMA PALLIDUM: CPT

## 2025-02-25 PROCEDURE — 81001 URINALYSIS AUTO W/SCOPE: CPT

## 2025-02-25 PROCEDURE — 82607 VITAMIN B-12: CPT

## 2025-02-25 PROCEDURE — 3075F SYST BP GE 130 - 139MM HG: CPT

## 2025-02-25 PROCEDURE — 87624 HPV HI-RISK TYP POOLED RSLT: CPT

## 2025-02-25 PROCEDURE — 87389 HIV-1 AG W/HIV-1&-2 AB AG IA: CPT

## 2025-02-25 PROCEDURE — 83550 IRON BINDING TEST: CPT

## 2025-02-25 PROCEDURE — 80053 COMPREHEN METABOLIC PANEL: CPT

## 2025-02-25 RX ORDER — SULFASALAZINE 500 MG/1
1000 TABLET ORAL 2 TIMES DAILY
COMMUNITY
Start: 2024-12-01

## 2025-02-25 RX ORDER — ADALIMUMAB 40MG/0.4ML
40 KIT SUBCUTANEOUS
COMMUNITY
Start: 2025-01-16

## 2025-02-25 SDOH — HEALTH STABILITY: PHYSICAL HEALTH: ON AVERAGE, HOW MANY MINUTES DO YOU ENGAGE IN EXERCISE AT THIS LEVEL?: 20 MIN

## 2025-02-25 SDOH — HEALTH STABILITY: PHYSICAL HEALTH: ON AVERAGE, HOW MANY DAYS PER WEEK DO YOU ENGAGE IN MODERATE TO STRENUOUS EXERCISE (LIKE A BRISK WALK)?: 2 DAYS

## 2025-02-25 ASSESSMENT — PATIENT HEALTH QUESTIONNAIRE - PHQ9
SUM OF ALL RESPONSES TO PHQ QUESTIONS 1-9: 17
SUM OF ALL RESPONSES TO PHQ QUESTIONS 1-9: 17
10. IF YOU CHECKED OFF ANY PROBLEMS, HOW DIFFICULT HAVE THESE PROBLEMS MADE IT FOR YOU TO DO YOUR WORK, TAKE CARE OF THINGS AT HOME, OR GET ALONG WITH OTHER PEOPLE: VERY DIFFICULT

## 2025-02-25 ASSESSMENT — COLUMBIA-SUICIDE SEVERITY RATING SCALE - C-SSRS
6. HAVE YOU EVER DONE ANYTHING, STARTED TO DO ANYTHING, OR PREPARED TO DO ANYTHING TO END YOUR LIFE?: NO
2. IN THE PAST MONTH, HAVE YOU ACTUALLY HAD ANY THOUGHTS OF KILLING YOURSELF?: NO
1. WITHIN THE PAST MONTH, HAVE YOU WISHED YOU WERE DEAD OR WISHED YOU COULD GO TO SLEEP AND NOT WAKE UP?: YES

## 2025-02-25 ASSESSMENT — ENCOUNTER SYMPTOMS: DYSURIA: 1

## 2025-02-25 ASSESSMENT — SOCIAL DETERMINANTS OF HEALTH (SDOH): HOW OFTEN DO YOU GET TOGETHER WITH FRIENDS OR RELATIVES?: NEVER

## 2025-02-25 NOTE — ASSESSMENT & PLAN NOTE
UA is not suggestive of infection. Will add on culture to confirm given her history.   Orders:    UA Macroscopic with reflex to Microscopic and Culture - Clinic Collect    UA Microscopic with Reflex to Culture    Urine Culture Aerobic Bacterial - lab collect; Future

## 2025-02-25 NOTE — ASSESSMENT & PLAN NOTE
Patient reports today that mental health is suboptimally controlled.  This has coincided with leaving a long-term, toxic relationship and other variables.  She is requesting a referral to collaborative psychiatry today and this referral was placed.  Current therapies include Seroquel 50 mg nightly (which she takes intermittently alongside her trazodone), as well as escitalopram 20 mg daily.  She had side effects of tremors with Wellbutrin, although did note that it helps with mental health.  She reports fleeting thoughts of suicide and self-harm, however denies any safety concerns nor plans.  She is aware that if the symptoms worsen she needs to be seen expeditiously in an emergency room.  I encouraged her to reach out if psychiatry consultation is significantly delayed or if symptoms are worsening in the interim.  Can always consider E consult with psychiatry if this is the case.  Orders:    Adult Mental Health  Referral; Future

## 2025-02-25 NOTE — ASSESSMENT & PLAN NOTE
Patient reports today that mental health is suboptimally controlled.  This has coincided with leaving a long-term, toxic relationship and other variables.  She is requesting a referral to collaborative psychiatry today and this referral was placed.  Current therapies include Seroquel 50 mg nightly (which she takes intermittently alongside her trazodone), as well as escitalopram 20 mg daily.  She had side effects of tremors with Wellbutrin, although did note that it helps with mental health.  She reports fleeting thoughts of suicide and self-harm, however denies any safety concerns nor plans.  She is aware that if the symptoms worsen she needs to be seen expeditiously in an emergency room.  I encouraged her to reach out if psychiatry consultation is significantly delayed or if symptoms are worsening in the interim.  Can always consider E consult with psychiatry if this is the case.

## 2025-02-25 NOTE — ASSESSMENT & PLAN NOTE
Patient's brother passed away from rupture of arthrosclerotic plaque in his late 30's and father passed away at the age of 57 from presumed MI. Patient was seen by cardiology in 2022. She had CT calcium scan with a result of 0 followed by an echocardiogram which was negative for any cardiomyopathy.

## 2025-02-25 NOTE — PROGRESS NOTES
Preventive Care Visit  [facility]  JENIFER Rios CNP, Family Medicine  Feb 25, 2025      Assessment & Plan   Assessment & Plan  Routine general medical examination at a health care facility  Annual exam.  Mammogram: Not indicated  PAP: Updated today.  Colonoscopy: Not indicated  Immunizations: UTD.  Labs: Discussed and offered.  Mood: As documented.  BMI: 20.92. Discussed diet and exercise.  Bone health: DEXA not indicated. Discussed bone health principles  Contraception: OCP  STI screen: Ordered  Recommend follow up in one year for annual exam or sooner if needed/indicated elsewhere.     Major depressive disorder, recurrent episode, mild  Patient reports today that mental health is suboptimally controlled.  This has coincided with leaving a long-term, toxic relationship and other variables.  She is requesting a referral to collaborative psychiatry today and this referral was placed.  Current therapies include Seroquel 50 mg nightly (which she takes intermittently alongside her trazodone), as well as escitalopram 20 mg daily.  She had side effects of tremors with Wellbutrin, although did note that it helps with mental health.  She reports fleeting thoughts of suicide and self-harm, however denies any safety concerns nor plans.  She is aware that if the symptoms worsen she needs to be seen expeditiously in an emergency room.  I encouraged her to reach out if psychiatry consultation is significantly delayed or if symptoms are worsening in the interim.  Can always consider E consult with psychiatry if this is the case.  Orders:    Adult Mental Health  Referral; Future    Chronic bladder pain  As previously documented with office visits, patient has struggled with chronic bladder pain and spasms for the better part of 2 years.  She had a consultation with urology in April 2024 and had a CT urogram completed at that time which was unrevealing.  Neck steps to that were  recommended included a cystoscopy, however this has not been completed.  She has a lot of evidence of pelvic floor dysfunction.  She reports multiple childbirths, the first of which was quite traumatic.  She struggles with chronic constipation and other pelvic pain symptoms.  Because her bladder symptoms have not always coincided with documented infections, I question interstitial cystitis and we reviewed this diagnosis.  I think she would benefit from consultation with urogynecology and a referral was placed today.  Orders:    Adult Uro/Gyn  Referral; Future    Dysuria  UA is not suggestive of infection. Will add on culture to confirm given her history.   Orders:    UA Macroscopic with reflex to Microscopic and Culture - Clinic Collect    UA Microscopic with Reflex to Culture    Urine Culture Aerobic Bacterial - lab collect; Future    Family history of sudden cardiac death in brother  Patient's brother passed away from rupture of arthrosclerotic plaque in his late 30's and father passed away at the age of 57 from presumed MI. Patient was seen by cardiology in 2022. She had CT calcium scan with a result of 0 followed by an echocardiogram which was negative for any cardiomyopathy.         Psoriatic arthritis (H)  Followed by rheumatology. Current therapies include sulfasalazine, Humira and PRN prednisone. Upcoming appointment Friday.         Endometriosis  Diagnosed in 2004 via laparoscopic procedure.          Encounter for vitamin deficiency screening    Orders:    Vitamin D Deficiency; Future    Vitamin B12; Future    Moderate binge-eating disorder  Patient reports worsened symptoms alongside exacerbated mental health concerns.  Referral to psychiatry as noted elsewhere.  She does report that the Vyvanse as prescribed by her primary care provider is quite helpful in controlling binge eating patterns.  Orders:    Adult Mental Health  Referral; Future    Screening for cervical cancer    Orders:     HPV and Gynecologic Cytology Panel - Recommended Age 30-65 Years    Screening for metabolic disorder    Orders:    Comprehensive metabolic panel (BMP + Alb, Alk Phos, ALT, AST, Total. Bili, TP); Future    Screening for deficiency anemia    Orders:    CBC with Platelets and Reflex to Iron Studies; Future    Iron & Iron Binding Capacity    Ferritin    Routine screening for STI (sexually transmitted infection)    Orders:    Chlamydia trachomatis/Neisseria gonorrhoeae by PCR - Clinic Collect    HIV Antigen Antibody Combo; Future    Treponema Abs w Reflex to RPR and Titer; Future    Thrombosed external hemorrhoids  Patient has a pea-sized thrombosed external hemorrhoid that has been present for the better part of a month.  Has not responded to conservative cares such as optimizing bowel health, fluids, over-the-counter's, etc.  She is interested in referral to colorectal surgery and this was placed today.  Orders:    Adult Colorectal Surgery  Referral; Future    Moderate recurrent major depression (H)  Patient reports today that mental health is suboptimally controlled.  This has coincided with leaving a long-term, toxic relationship and other variables.  She is requesting a referral to collaborative psychiatry today and this referral was placed.  Current therapies include Seroquel 50 mg nightly (which she takes intermittently alongside her trazodone), as well as escitalopram 20 mg daily.  She had side effects of tremors with Wellbutrin, although did note that it helps with mental health.  She reports fleeting thoughts of suicide and self-harm, however denies any safety concerns nor plans.  She is aware that if the symptoms worsen she needs to be seen expeditiously in an emergency room.  I encouraged her to reach out if psychiatry consultation is significantly delayed or if symptoms are worsening in the interim.  Can always consider E consult with psychiatry if this is the case.         Patient has been advised  of split billing requirements and indicates understanding: Yes       Depression Screening Follow Up        2/25/2025    12:32 PM   PHQ   PHQ-9 Total Score 17    Q9: Thoughts of better off dead/self-harm past 2 weeks Several days   F/U: Thoughts of suicide or self-harm Yes   F/U: Self harm-plan Yes   F/U: Self-harm action No   F/U: Safety concerns No       Patient-reported         2/25/2025    12:32 PM   Last PHQ-9   1.  Little interest or pleasure in doing things 2   2.  Feeling down, depressed, or hopeless 2   3.  Trouble falling or staying asleep, or sleeping too much 2   4.  Feeling tired or having little energy 2   5.  Poor appetite or overeating 2   6.  Feeling bad about yourself 2   7.  Trouble concentrating 3   8.  Moving slowly or restless 1   Q9: Thoughts of better off dead/self-harm past 2 weeks 1   PHQ-9 Total Score 17    In the past two weeks have you had thoughts of suicide or self harm? Yes   Do you have concerns about your personal safety or the safety of others? No   In the past 2 weeks have you thought about a plan or had intention to harm yourself? Yes   In the past 2 weeks have you acted on these thoughts in any way? No       Patient-reported               2/25/2025     3:08 PM   C-SSRS (Brief [place])   Within the last month, have you wished you were dead or wished you could go to sleep and not wake up? Yes   Within the last month, have you had any actual thoughts of killing yourself? No   Within the last month, have you ever done anything, started to do anything, or prepared to do anything to end your life? No               Follow Up Actions Taken  Crisis resource information provided in the After Visit Summary  Mental Health Referral placed    Discussed the following ways the patient can remain in a safe environment:  be around others    Counseling  Appropriate preventive services were addressed with this patient via screening, questionnaire, or discussion as appropriate for fall prevention,  nutrition, physical activity, Tobacco-use cessation, social engagement, weight loss and cognition.  Checklist reviewing preventive services available has been given to the patient.  Reviewed patient's diet, addressing concerns and/or questions.   She is at risk for lack of exercise and has been provided with information to increase physical activity for the benefit of her well-being.   Patient is at risk for social isolation and has been provided with information about the benefit of social connection.   She is at risk for psychosocial distress and has been provided with information to reduce risk.   The patient's PHQ-9 score is consistent with moderate depression. She was provided with information regarding depression.     Leslee Rasheed is a 39 year old, presenting for the following:  Physical (Pt. Fasting. PAP due. ) and Dysuria (Continues and has increased last couple of weeks. )        2/25/2025    12:53 PM   Additional Questions   Roomed by sac   Accompanied by self         2/25/2025    12:53 PM   Patient Reported Additional Medications   Patient reports taking the following new medications no          Patient is a pleasant 39-year-old presenting today for her annual physical.  She is hoping to get a referral to collaborative psychiatry.  Mental health has been quite poor; she reports the end of a relationship recently that has been quite toxic as well as a recent death.  She also would like a urine test completed, she has been having so increased bladder pain.    Dysuria    Health Care Directive  Patient does not have a Health Care Directive: Discussed advance care planning with patient; however, patient declined at this time.      2/25/2025   General Health   How would you rate your overall physical health? (!) FAIR   Feel stress (tense, anxious, or unable to sleep) Very much   (!) STRESS CONCERN      2/25/2025   Nutrition   Three or more servings of calcium each day? (!) NO   Diet: Regular (no  restrictions)   How many servings of fruit and vegetables per day? (!) 0-1   How many sweetened beverages each day? 0-1         2/25/2025   Exercise   Days per week of moderate/strenous exercise 2 days   Average minutes spent exercising at this level 20 min   (!) EXERCISE CONCERN      2/25/2025   Social Factors   Frequency of gathering with friends or relatives Never   Worry food won't last until get money to buy more No   Food not last or not have enough money for food? No   Do you have housing? (Housing is defined as stable permanent housing and does not include staying ouside in a car, in a tent, in an abandoned building, in an overnight shelter, or couch-surfing.) Yes   Are you worried about losing your housing? No   Lack of transportation? No   Unable to get utilities (heat,electricity)? No   (!) SOCIAL CONNECTIONS CONCERN      2/25/2025   Dental   Dentist two times every year? Yes          Today's PHQ-9 Score:       2/25/2025    12:32 PM   PHQ-9 SCORE   PHQ-9 Total Score MyChart 17 (Moderately severe depression)   PHQ-9 Total Score 17        Patient-reported         2/25/2025   Substance Use   Alcohol more than 3/day or more than 7/wk No   Do you use any other substances recreationally? No     Social History     Tobacco Use    Smoking status: Never     Passive exposure: Past    Smokeless tobacco: Never   Vaping Use    Vaping status: Never Used   Substance Use Topics    Alcohol use: No    Drug use: No           9/28/2022   LAST FHS-7 RESULTS   1st degree relative breast or ovarian cancer No   Any relative bilateral breast cancer No   Any male have breast cancer No   Any ONE woman have BOTH breast AND ovarian cancer No   Any woman with breast cancer before 50yrs No   2 or more relatives with breast AND/OR ovarian cancer No   2 or more relatives with breast AND/OR bowel cancer Yes        Mammogram Screening - Patient under 40 years of age: Routine Mammogram Screening not recommended.         2/25/2025   STI  "Screening   New sexual partner(s) since last STI/HIV test? (!) YES ordered     History of abnormal Pap smear: No - age 30- 64 PAP with HPV every 5 years recommended        Latest Ref Rng & Units 8/27/2020     4:43 PM 8/27/2020     4:15 PM 2/23/2016     1:22 PM   PAP / HPV   PAP    Negative for squamous intraepithelial lesion or malignancy  Electronically signed by Annika Marte CT (ASCP) on 3/2/2016 at 11:31 AM      PAP (Historical)  NIL      HPV 16 DNA NEG^Negative  Negative     HPV 18 DNA NEG^Negative  Negative     Other HR HPV NEG^Negative  Negative             2/25/2025   Contraception/Family Planning   Questions about contraception or family planning No        Reviewed and updated as needed this visit by Provider                   Objective    Exam  LMP 02/16/2025 (Approximate)    Estimated body mass index is 23.59 kg/m  as calculated from the following:    Height as of 8/9/24: 1.689 m (5' 6.5\").    Weight as of 8/9/24: 67.3 kg (148 lb 6.4 oz).    Physical Exam  GENERAL: alert and no distress  EYES: Eyes grossly normal to inspection, PERRL and conjunctivae and sclerae normal  HENT: ear canals and TM's normal, nose and mouth without ulcers or lesions  NECK: no adenopathy, no asymmetry, masses, or scars  RESP: lungs clear to auscultation - no rales, rhonchi or wheezes  BREAST: normal without masses, tenderness or nipple discharge and no palpable axillary masses or adenopathy. Dense tissue.  CV: regular rate and rhythm, normal S1 S2, no S3 or S4, no murmur, click or rub, no peripheral edema  ABDOMEN: soft, no hepatosplenomegaly, no masses and bowel sounds normal. Mild tenderness in suprapubic region.   (female) w/bimanual: normal female external genitalia, normal urethral meatus, normal vaginal mucosa, and normal cervix/adnexa/uterus without masses or discharge. Pea-sized, thrombosed hemorrhoid at 12:00 with tenderness to palpation.   MS: no gross musculoskeletal defects noted, no edema  SKIN: no suspicious " lesions or rashes  NEURO: Normal strength and tone, mentation intact and speech normal  PSYCH: mentation appears normal, affect normal/bright    Signed Electronically by: JENIFER Rios CNP    Answers submitted by the patient for this visit:  Patient Health Questionnaire (Submitted on 2/25/2025)  If you checked off any problems, how difficult have these problems made it for you to do your work, take care of things at home, or get along with other people?: Very difficult  PHQ9 TOTAL SCORE: 17

## 2025-02-25 NOTE — PATIENT INSTRUCTIONS
Patient Education   Preventive Care Advice   This is general advice given by our system to help you stay healthy. However, your care team may have specific advice just for you. Please talk to your care team about your preventive care needs.  Nutrition  Eat 5 or more servings of fruits and vegetables each day.  Try wheat bread, brown rice and whole grain pasta (instead of white bread, rice, and pasta).  Get enough calcium and vitamin D. Check the label on foods and aim for 100% of the RDA (recommended daily allowance).  Lifestyle  Exercise at least 150 minutes each week  (30 minutes a day, 5 days a week).  Do muscle strengthening activities 2 days a week. These help control your weight and prevent disease.  No smoking.  Wear sunscreen to prevent skin cancer.  Have a dental exam and cleaning every 6 months.  Yearly exams  See your health care team every year to talk about:  Any changes in your health.  Any medicines your care team has prescribed.  Preventive care, family planning, and ways to prevent chronic diseases.  Shots (vaccines)   HPV shots (up to age 26), if you've never had them before.  Hepatitis B shots (up to age 59), if you've never had them before.  COVID-19 shot: Get this shot when it's due.  Flu shot: Get a flu shot every year.  Tetanus shot: Get a tetanus shot every 10 years.  Pneumococcal, hepatitis A, and RSV shots: Ask your care team if you need these based on your risk.  Shingles shot (for age 50 and up)  General health tests  Diabetes screening:  Starting at age 35, Get screened for diabetes at least every 3 years.  If you are younger than age 35, ask your care team if you should be screened for diabetes.  Cholesterol test: At age 39, start having a cholesterol test every 5 years, or more often if advised.  Bone density scan (DEXA): At age 50, ask your care team if you should have this scan for osteoporosis (brittle bones).  Hepatitis C: Get tested at least once in your life.  STIs (sexually  transmitted infections)  Before age 24: Ask your care team if you should be screened for STIs.  After age 24: Get screened for STIs if you're at risk. You are at risk for STIs (including HIV) if:  You are sexually active with more than one person.  You don't use condoms every time.  You or a partner was diagnosed with a sexually transmitted infection.  If you are at risk for HIV, ask about PrEP medicine to prevent HIV.  Get tested for HIV at least once in your life, whether you are at risk for HIV or not.  Cancer screening tests  Cervical cancer screening: If you have a cervix, begin getting regular cervical cancer screening tests starting at age 21.  Breast cancer scan (mammogram): If you've ever had breasts, begin having regular mammograms starting at age 40. This is a scan to check for breast cancer.  Colon cancer screening: It is important to start screening for colon cancer at age 45.  Have a colonoscopy test every 10 years (or more often if you're at risk) Or, ask your provider about stool tests like a FIT test every year or Cologuard test every 3 years.  To learn more about your testing options, visit:   .  For help making a decision, visit:   https://bit.ly/fj77786.  Prostate cancer screening test: If you have a prostate, ask your care team if a prostate cancer screening test (PSA) at age 55 is right for you.  Lung cancer screening: If you are a current or former smoker ages 50 to 80, ask your care team if ongoing lung cancer screenings are right for you.  For informational purposes only. Not to replace the advice of your health care provider. Copyright   2023 [facility]. All rights reserved. Clinically reviewed by the [facility]. [employer] [id number] - REV 01/24.  Learning About Stress  What is stress?     Stress is your body's response to a hard situation. Your body can have a physical, emotional, or mental response. Stress is a fact of life for most people, and it  affects everyone differently. What causes stress for you may not be stressful for someone else.  A lot of things can cause stress. You may feel stress when you go on a job interview, take a test, or run a race. This kind of short-term stress is normal and even useful. It can help you if you need to work hard or react quickly. For example, stress can help you finish an important job on time.  Long-term stress is caused by ongoing stressful situations or events. Examples of long-term stress include long-term health problems, ongoing problems at work, or conflicts in your family. Long-term stress can harm your health.  How does stress affect your health?  When you are stressed, your body responds as though you are in danger. It makes hormones that speed up your heart, make you breathe faster, and give you a burst of energy. This is called the fight-or-flight stress response. If the stress is over quickly, your body goes back to normal and no harm is done.  But if stress happens too often or lasts too long, it can have bad effects. Long-term stress can make you more likely to get sick, and it can make symptoms of some diseases worse. If you tense up when you are stressed, you may develop neck, shoulder, or low back pain. Stress is linked to high blood pressure and heart disease.  Stress also harms your emotional health. It can make you steinberg, tense, or depressed. Your relationships may suffer, and you may not do well at work or school.  What can you do to manage stress?  You can try these things to help manage stress:   Do something active. Exercise or activity can help reduce stress. Walking is a great way to get started. Even everyday activities such as housecleaning or yard work can help.  Try yoga or jamin chi. These techniques combine exercise and meditation. You may need some training at first to learn them.  Do something you enjoy. For example, listen to music or go to a movie. Practice your hobby or do volunteer  "work.  Meditate. This can help you relax, because you are not worrying about what happened before or what may happen in the future.  Do guided imagery. Imagine yourself in any setting that helps you feel calm. You can use online videos, books, or a teacher to guide you.  Do breathing exercises. For example:  From a standing position, bend forward from the waist with your knees slightly bent. Let your arms dangle close to the floor.  Breathe in slowly and deeply as you return to a standing position. Roll up slowly and lift your head last.  Hold your breath for just a few seconds in the standing position.  Breathe out slowly and bend forward from the waist.  Let your feelings out. Talk, laugh, cry, and express anger when you need to. Talking with supportive friends or family, a counselor, or a turner leader about your feelings is a healthy way to relieve stress. Avoid discussing your feelings with people who make you feel worse.  Write. It may help to write about things that are bothering you. This helps you find out how much stress you feel and what is causing it. When you know this, you can find better ways to cope.  What can you do to prevent stress?  You might try some of these things to help prevent stress:  Manage your time. This helps you find time to do the things you want and need to do.  Get enough sleep. Your body recovers from the stresses of the day while you are sleeping.  Get support. Your family, friends, and community can make a difference in how you experience stress.  Limit your news feed. Avoid or limit time on social media or news that may make you feel stressed.  Do something active. Exercise or activity can help reduce stress. Walking is a great way to get started.  Where can you learn more?  Go to https://www.[employer]net/patiented  Enter N032 in the search box to learn more about \"Learning About Stress.\"  Current as of: October 24, 2023  Content Version: 14.3    2024 [email or web address]. "   Care instructions adapted under license by your healthcare professional. If you have questions about a medical condition or this instruction, always ask your healthcare professional. [employer] disclaims any warranty or liability for your use of this information.    Learning About Depression Screening  What is depression screening?  Depression screening is a way to see if you have depression symptoms. It may be done by a doctor or counselor. It's often part of a routine checkup. That's because your mental health is just as important as your physical health.  Depression is a mental health condition that affects how you feel, think, and act. You may:  Have less energy.  Lose interest in your daily activities.  Feel sad and grouchy for a long time.  Depression is very common. It affects people of all ages.  Many things can lead to depression. Some people become depressed after they have a stroke or find out they have a major illness like cancer or heart disease. The death of a loved one or a breakup may lead to depression. It can run in families. Most experts believe that a combination of inherited genes and stressful life events can cause it.  What happens during screening?  You may be asked to fill out a form about your depression symptoms. You and the doctor will discuss your answers. The doctor may ask you more questions to learn more about how you think, act, and feel.  What happens after screening?  If you have symptoms of depression, your doctor will talk to you about your options.  Doctors usually treat depression with medicines or counseling. Often, combining the two works best. Many people don't get help because they think that they'll get over the depression on their own. But people with depression may not get better unless they get treatment.  The cause of depression is not well understood. There may be many factors involved. But if you have depression, it's not your fault.  A serious symptom  "of depression is thinking about death or suicide. If you or someone you care about talks about this or about feeling hopeless, get help right away.  It's important to know that depression can be treated. Medicine, counseling, and self-care may help.  Where can you learn more?  Go to https://www.[employer].net/patiented  Enter T185 in the search box to learn more about \"Learning About Depression Screening.\"  Current as of: July 31, 2024  Content Version: 14.3    2024 [employer].   Care instructions adapted under license by your healthcare professional. If you have questions about a medical condition or this instruction, always ask your healthcare professional. [employer] disclaims any warranty or liability for your use of this information.    Safer Sex: Care Instructions  Overview  Safer sex is a way to reduce your risk of getting a sexually transmitted infection (STI). It can also help prevent pregnancy.  Several products can help you practice safer sex and reduce your chance of STIs. One of the best is a condom. There are internal and external condoms. You can use a special rubber sheet (dental dam) for protection during oral sex. Disposable gloves can keep your hands from touching blood, semen, or other body fluids that can carry infections.  Remember that birth control methods such as diaphragms, IUDs, foams, and birth control pills do not stop you from getting STIs.  Follow-up care is a key part of your treatment and safety. Be sure to make and go to all appointments, and call your doctor if you are having problems. It's also a good idea to know your test results and keep a list of the medicines you take.  How can you care for yourself at home?  Think about getting vaccinated to help prevent hepatitis A, hepatitis B, and human papillomavirus (HPV). They can be spread through sex.  Use a condom every time you have sex. Use an external condom, which goes on the penis. Or use an internal " "condom, which goes into the vagina or anus.  Make sure you use the right size external condom. A condom that's too small can break easily. A condom that's too big can slip off during sex.  Use a new condom each time you have sex. Be careful not to poke a hole in the condom when you open the wrapper.  Don't use an internal condom and an external condom at the same time.  Never use petroleum jelly (such as Vaseline), grease, hand lotion, baby oil, or anything with oil in it. These products can make holes in the condom.  After intercourse, hold the edge of the condom as you remove it. This will help keep semen from spilling out of the condom.  Do not have sex with anyone who has symptoms of an STI, such as sores on the genitals or mouth.  Do not drink a lot of alcohol or use drugs before sex.  Limit your sex partners. Sex with one partner who has sex only with you can reduce your risk of getting an STI.  Don't share sex toys. But if you do share them, use a condom and clean the sex toys between each use.  Talk to any partners before you have sex. Talk about what you feel comfortable with and whether you have any boundaries with sex. And find out if your partner or partners may be at risk for any STI. Keep in mind that a person may be able to spread an STI even if they do not have symptoms. You and any partners may want to get tested for STIs.  Where can you learn more?  Go to https://www.[employer].net/patiented  Enter B608 in the search box to learn more about \"Safer Sex: Care Instructions.\"  Current as of: April 30, 2024  Content Version: 14.3    2024 [employer].   Care instructions adapted under license by your healthcare professional. If you have questions about a medical condition or this instruction, always ask your healthcare professional. [employer] disclaims any warranty or liability for your use of this information.       "

## 2025-02-25 NOTE — ASSESSMENT & PLAN NOTE
Followed by rheumatology. Current therapies include sulfasalazine, Humira and PRN prednisone. Upcoming appointment Friday.

## 2025-02-25 NOTE — ASSESSMENT & PLAN NOTE
Patient reports worsened symptoms alongside exacerbated mental health concerns.  Referral to psychiatry as noted elsewhere.  She does report that the Vyvanse as prescribed by her primary care provider is quite helpful in controlling binge eating patterns.  Orders:    Adult Mental Health  Referral; Future

## 2025-02-26 LAB
ALBUMIN SERPL BCG-MCNC: 4.3 G/DL (ref 3.5–5.2)
ALP SERPL-CCNC: 40 U/L (ref 40–150)
ALT SERPL W P-5'-P-CCNC: 21 U/L (ref 0–50)
ANION GAP SERPL CALCULATED.3IONS-SCNC: 12 MMOL/L (ref 7–15)
AST SERPL W P-5'-P-CCNC: 24 U/L (ref 0–45)
BILIRUB SERPL-MCNC: 0.5 MG/DL
BUN SERPL-MCNC: 7 MG/DL (ref 6–20)
C TRACH DNA SPEC QL PROBE+SIG AMP: NEGATIVE
CALCIUM SERPL-MCNC: 9.1 MG/DL (ref 8.8–10.4)
CHLORIDE SERPL-SCNC: 97 MMOL/L (ref 98–107)
CREAT SERPL-MCNC: 0.77 MG/DL (ref 0.51–0.95)
EGFRCR SERPLBLD CKD-EPI 2021: >90 ML/MIN/1.73M2
FERRITIN SERPL-MCNC: 24 NG/ML (ref 6–175)
GLUCOSE SERPL-MCNC: 81 MG/DL (ref 70–99)
HCO3 SERPL-SCNC: 25 MMOL/L (ref 22–29)
HIV 1+2 AB+HIV1 P24 AG SERPL QL IA: NONREACTIVE
IRON BINDING CAPACITY (ROCHE): 370 UG/DL (ref 240–430)
IRON SATN MFR SERPL: 6 % (ref 15–46)
IRON SERPL-MCNC: 24 UG/DL (ref 37–145)
N GONORRHOEA DNA SPEC QL NAA+PROBE: NEGATIVE
POTASSIUM SERPL-SCNC: 3.3 MMOL/L (ref 3.4–5.3)
PROT SERPL-MCNC: 7 G/DL (ref 6.4–8.3)
SODIUM SERPL-SCNC: 134 MMOL/L (ref 135–145)
SPECIMEN TYPE: NORMAL
T PALLIDUM AB SER QL: NONREACTIVE
VIT B12 SERPL-MCNC: 416 PG/ML (ref 232–1245)
VIT D+METAB SERPL-MCNC: 34 NG/ML (ref 20–50)

## 2025-02-27 DIAGNOSIS — E61.1 IRON DEFICIENCY: Primary | ICD-10-CM

## 2025-02-27 DIAGNOSIS — E87.6 HYPOKALEMIA: ICD-10-CM

## 2025-02-27 LAB
BACTERIA UR CULT: NORMAL
HPV HR 12 DNA CVX QL NAA+PROBE: NEGATIVE
HPV16 DNA CVX QL NAA+PROBE: NEGATIVE
HPV18 DNA CVX QL NAA+PROBE: NEGATIVE
HUMAN PAPILLOMA VIRUS FINAL DIAGNOSIS: NORMAL

## 2025-02-27 RX ORDER — FERROUS SULFATE 325(65) MG
325 TABLET ORAL
Qty: 90 TABLET | Refills: 1 | Status: SHIPPED | OUTPATIENT
Start: 2025-02-27

## 2025-03-03 PROBLEM — Z12.4 CERVICAL CANCER SCREENING: Status: ACTIVE | Noted: 2025-03-03
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
## 2025-03-13 ENCOUNTER — VIRTUAL VISIT (OUTPATIENT)
Dept: PSYCHOLOGY | Facility: CLINIC | Age: 40
End: 2025-03-13
Attending: FAMILY MEDICINE
Payer: COMMERCIAL

## 2025-03-13 DIAGNOSIS — F41.1 GAD (GENERALIZED ANXIETY DISORDER): Primary | ICD-10-CM

## 2025-03-13 DIAGNOSIS — F33.0 MAJOR DEPRESSIVE DISORDER, RECURRENT EPISODE, MILD: ICD-10-CM

## 2025-03-13 ASSESSMENT — ANXIETY QUESTIONNAIRES
8. IF YOU CHECKED OFF ANY PROBLEMS, HOW DIFFICULT HAVE THESE MADE IT FOR YOU TO DO YOUR WORK, TAKE CARE OF THINGS AT HOME, OR GET ALONG WITH OTHER PEOPLE?: VERY DIFFICULT
GAD7 TOTAL SCORE: 13
7. FEELING AFRAID AS IF SOMETHING AWFUL MIGHT HAPPEN: SEVERAL DAYS
1. FEELING NERVOUS, ANXIOUS, OR ON EDGE: NEARLY EVERY DAY
6. BECOMING EASILY ANNOYED OR IRRITABLE: SEVERAL DAYS
4. TROUBLE RELAXING: MORE THAN HALF THE DAYS
GAD7 TOTAL SCORE: 13
IF YOU CHECKED OFF ANY PROBLEMS ON THIS QUESTIONNAIRE, HOW DIFFICULT HAVE THESE PROBLEMS MADE IT FOR YOU TO DO YOUR WORK, TAKE CARE OF THINGS AT HOME, OR GET ALONG WITH OTHER PEOPLE: VERY DIFFICULT
3. WORRYING TOO MUCH ABOUT DIFFERENT THINGS: MORE THAN HALF THE DAYS
2. NOT BEING ABLE TO STOP OR CONTROL WORRYING: NEARLY EVERY DAY
5. BEING SO RESTLESS THAT IT IS HARD TO SIT STILL: SEVERAL DAYS
7. FEELING AFRAID AS IF SOMETHING AWFUL MIGHT HAPPEN: SEVERAL DAYS
GAD7 TOTAL SCORE: 13

## 2025-03-13 ASSESSMENT — COLUMBIA-SUICIDE SEVERITY RATING SCALE - C-SSRS
1. SINCE LAST CONTACT, HAVE YOU WISHED YOU WERE DEAD OR WISHED YOU COULD GO TO SLEEP AND NOT WAKE UP?: NO
TOTAL  NUMBER OF ABORTED OR SELF INTERRUPTED ATTEMPTS SINCE LAST CONTACT: NO
TOTAL  NUMBER OF INTERRUPTED ATTEMPTS SINCE LAST CONTACT: NO
SUICIDE, SINCE LAST CONTACT: NO
2. HAVE YOU ACTUALLY HAD ANY THOUGHTS OF KILLING YOURSELF?: NO
6. HAVE YOU EVER DONE ANYTHING, STARTED TO DO ANYTHING, OR PREPARED TO DO ANYTHING TO END YOUR LIFE?: NO
ATTEMPT SINCE LAST CONTACT: NO

## 2025-03-13 NOTE — Clinical Note
Hello,  I completed the DA on Kathya today and will sending out an email to the team to see who would be a good fit for her as a therapist.  Minerva Parikh [facility]

## 2025-03-17 ENCOUNTER — MYC REFILL (OUTPATIENT)
Dept: FAMILY MEDICINE | Facility: CLINIC | Age: 40
End: 2025-03-17
Payer: COMMERCIAL

## 2025-03-17 DIAGNOSIS — F50.811 MODERATE BINGE-EATING DISORDER: ICD-10-CM

## 2025-03-17 RX ORDER — LISDEXAMFETAMINE DIMESYLATE 60 MG/1
60 CAPSULE ORAL EVERY MORNING
Qty: 30 CAPSULE | Refills: 0 | Status: SHIPPED | OUTPATIENT
Start: 2025-03-17

## 2025-03-24 ENCOUNTER — MYC MEDICAL ADVICE (OUTPATIENT)
Dept: FAMILY MEDICINE | Facility: CLINIC | Age: 40
End: 2025-03-24
Payer: COMMERCIAL

## 2025-04-15 ENCOUNTER — VIRTUAL VISIT (OUTPATIENT)
Dept: PSYCHOLOGY | Facility: CLINIC | Age: 40
End: 2025-04-15
Payer: COMMERCIAL

## 2025-04-15 DIAGNOSIS — F43.10 PTSD (POST-TRAUMATIC STRESS DISORDER): ICD-10-CM

## 2025-04-15 DIAGNOSIS — F33.1 MODERATE RECURRENT MAJOR DEPRESSION (H): Primary | ICD-10-CM

## 2025-04-15 DIAGNOSIS — Z56.6 WORK-RELATED STRESS: ICD-10-CM

## 2025-04-15 DIAGNOSIS — F41.1 GAD (GENERALIZED ANXIETY DISORDER): ICD-10-CM

## 2025-04-15 PROCEDURE — 90837 PSYTX W PT 60 MINUTES: CPT | Mod: 95 | Performed by: SOCIAL WORKER

## 2025-04-15 SDOH — HEALTH STABILITY - MENTAL HEALTH: OTHER PHYSICAL AND MENTAL STRAIN RELATED TO WORK: Z56.6

## 2025-04-16 ENCOUNTER — MYC REFILL (OUTPATIENT)
Dept: FAMILY MEDICINE | Facility: CLINIC | Age: 40
End: 2025-04-16
Payer: COMMERCIAL

## 2025-04-16 ENCOUNTER — DOCUMENTATION ONLY (OUTPATIENT)
Dept: PSYCHOLOGY | Facility: CLINIC | Age: 40
End: 2025-04-16
Payer: COMMERCIAL

## 2025-04-16 DIAGNOSIS — F50.811 MODERATE BINGE-EATING DISORDER: ICD-10-CM

## 2025-04-16 PROBLEM — Z56.6 WORK-RELATED STRESS: Status: ACTIVE | Noted: 2025-04-16

## 2025-04-16 PROBLEM — F41.1 GAD (GENERALIZED ANXIETY DISORDER): Status: ACTIVE | Noted: 2025-04-16

## 2025-04-16 PROBLEM — F43.10 PTSD (POST-TRAUMATIC STRESS DISORDER): Status: ACTIVE | Noted: 2025-04-16

## 2025-04-16 RX ORDER — LISDEXAMFETAMINE DIMESYLATE 60 MG/1
60 CAPSULE ORAL EVERY MORNING
Qty: 30 CAPSULE | Refills: 0 | Status: SHIPPED | OUTPATIENT
Start: 2025-04-16
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
## 2025-04-16 NOTE — PROGRESS NOTES
Case Consultation Record       Client Name: Kathya Chino   Date:  4/15/2025    Diagnosis:Major depressive disorder, recurrent episode, mild  Moderate binge-eating disorder  PMDD (premenstrual dysphoric disorder)  Moderate recurrent major depression (H)  ZACK (generalized anxiety disorder)  PTSD (post-traumatic stress disorder) .    Therapist: Ami Todd LICSW LMFT      Team Members Present:  Daly Fine. Child/Adolescent  Consultation Group    Purpose:   Resources    Recommendations:  Several resources were shared that will be  Shared with client       Ami Todd LICSW LMFT April 15, 2025

## 2025-05-13 ENCOUNTER — MYC REFILL (OUTPATIENT)
Dept: FAMILY MEDICINE | Facility: CLINIC | Age: 40
End: 2025-05-13
Payer: COMMERCIAL

## 2025-05-13 DIAGNOSIS — F50.811 MODERATE BINGE-EATING DISORDER: ICD-10-CM

## 2025-05-14 RX ORDER — LISDEXAMFETAMINE DIMESYLATE 60 MG/1
60 CAPSULE ORAL EVERY MORNING
Qty: 30 CAPSULE | Refills: 0 | Status: SHIPPED | OUTPATIENT
Start: 2025-05-14

## 2025-06-16 ENCOUNTER — MYC REFILL (OUTPATIENT)
Dept: FAMILY MEDICINE | Facility: CLINIC | Age: 40
End: 2025-06-16
Payer: COMMERCIAL

## 2025-06-16 DIAGNOSIS — F50.811 MODERATE BINGE-EATING DISORDER: ICD-10-CM

## 2025-06-16 RX ORDER — LISDEXAMFETAMINE DIMESYLATE 60 MG/1
60 CAPSULE ORAL EVERY MORNING
Qty: 30 CAPSULE | Refills: 0 | Status: SHIPPED | OUTPATIENT
Start: 2025-06-16

## 2025-07-01 ENCOUNTER — VIRTUAL VISIT (OUTPATIENT)
Dept: PSYCHOLOGY | Facility: CLINIC | Age: 40
End: 2025-07-01
Payer: COMMERCIAL

## 2025-07-01 DIAGNOSIS — F43.10 PTSD (POST-TRAUMATIC STRESS DISORDER): ICD-10-CM

## 2025-07-01 DIAGNOSIS — Z56.6 WORK-RELATED STRESS: ICD-10-CM

## 2025-07-01 DIAGNOSIS — F33.1 MODERATE RECURRENT MAJOR DEPRESSION (H): Primary | ICD-10-CM

## 2025-07-01 DIAGNOSIS — F41.1 GAD (GENERALIZED ANXIETY DISORDER): ICD-10-CM

## 2025-07-01 PROCEDURE — 90837 PSYTX W PT 60 MINUTES: CPT | Mod: 95 | Performed by: SOCIAL WORKER

## 2025-07-01 SDOH — HEALTH STABILITY - MENTAL HEALTH: OTHER PHYSICAL AND MENTAL STRAIN RELATED TO WORK: Z56.6

## 2025-07-01 ASSESSMENT — PATIENT HEALTH QUESTIONNAIRE - PHQ9
10. IF YOU CHECKED OFF ANY PROBLEMS, HOW DIFFICULT HAVE THESE PROBLEMS MADE IT FOR YOU TO DO YOUR WORK, TAKE CARE OF THINGS AT HOME, OR GET ALONG WITH OTHER PEOPLE: SOMEWHAT DIFFICULT
SUM OF ALL RESPONSES TO PHQ QUESTIONS 1-9: 11
SUM OF ALL RESPONSES TO PHQ QUESTIONS 1-9: 11

## 2025-07-01 ASSESSMENT — ANXIETY QUESTIONNAIRES
6. BECOMING EASILY ANNOYED OR IRRITABLE: SEVERAL DAYS
7. FEELING AFRAID AS IF SOMETHING AWFUL MIGHT HAPPEN: SEVERAL DAYS
GAD7 TOTAL SCORE: 12
4. TROUBLE RELAXING: MORE THAN HALF THE DAYS
1. FEELING NERVOUS, ANXIOUS, OR ON EDGE: MORE THAN HALF THE DAYS
3. WORRYING TOO MUCH ABOUT DIFFERENT THINGS: MORE THAN HALF THE DAYS
GAD7 TOTAL SCORE: 12
8. IF YOU CHECKED OFF ANY PROBLEMS, HOW DIFFICULT HAVE THESE MADE IT FOR YOU TO DO YOUR WORK, TAKE CARE OF THINGS AT HOME, OR GET ALONG WITH OTHER PEOPLE?: SOMEWHAT DIFFICULT
7. FEELING AFRAID AS IF SOMETHING AWFUL MIGHT HAPPEN: SEVERAL DAYS
IF YOU CHECKED OFF ANY PROBLEMS ON THIS QUESTIONNAIRE, HOW DIFFICULT HAVE THESE PROBLEMS MADE IT FOR YOU TO DO YOUR WORK, TAKE CARE OF THINGS AT HOME, OR GET ALONG WITH OTHER PEOPLE: SOMEWHAT DIFFICULT
GAD7 TOTAL SCORE: 12
2. NOT BEING ABLE TO STOP OR CONTROL WORRYING: MORE THAN HALF THE DAYS
5. BEING SO RESTLESS THAT IT IS HARD TO SIT STILL: MORE THAN HALF THE DAYS
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
## 2025-07-17 ENCOUNTER — MYC REFILL (OUTPATIENT)
Dept: FAMILY MEDICINE | Facility: CLINIC | Age: 40
End: 2025-07-17
Payer: COMMERCIAL

## 2025-07-17 DIAGNOSIS — F50.811 MODERATE BINGE-EATING DISORDER: ICD-10-CM

## 2025-07-17 RX ORDER — LISDEXAMFETAMINE DIMESYLATE 60 MG/1
60 CAPSULE ORAL EVERY MORNING
Qty: 30 CAPSULE | Refills: 0 | Status: SHIPPED | OUTPATIENT
Start: 2025-07-17

## 2025-07-29 ENCOUNTER — VIRTUAL VISIT (OUTPATIENT)
Dept: PSYCHOLOGY | Facility: CLINIC | Age: 40
End: 2025-07-29
Payer: COMMERCIAL

## 2025-07-29 DIAGNOSIS — F41.1 GAD (GENERALIZED ANXIETY DISORDER): ICD-10-CM

## 2025-07-29 DIAGNOSIS — Z56.6 WORK-RELATED STRESS: ICD-10-CM

## 2025-07-29 DIAGNOSIS — F33.1 MODERATE RECURRENT MAJOR DEPRESSION (H): Primary | ICD-10-CM

## 2025-07-29 DIAGNOSIS — Z63.5 PROBLEMS ASSOCIATED WITH DIVORCE: ICD-10-CM

## 2025-07-29 DIAGNOSIS — F43.10 PTSD (POST-TRAUMATIC STRESS DISORDER): ICD-10-CM

## 2025-07-29 PROCEDURE — 90837 PSYTX W PT 60 MINUTES: CPT | Mod: 95 | Performed by: SOCIAL WORKER

## 2025-07-29 SDOH — SOCIAL STABILITY - SOCIAL INSECURITY: DISRUPTION OF FAMILY BY SEPARATION AND DIVORCE: Z63.5

## 2025-07-29 SDOH — HEALTH STABILITY - MENTAL HEALTH: OTHER PHYSICAL AND MENTAL STRAIN RELATED TO WORK: Z56.6

## 2025-07-29 ASSESSMENT — ANXIETY QUESTIONNAIRES
5. BEING SO RESTLESS THAT IT IS HARD TO SIT STILL: MORE THAN HALF THE DAYS
4. TROUBLE RELAXING: MORE THAN HALF THE DAYS
GAD7 TOTAL SCORE: 10
1. FEELING NERVOUS, ANXIOUS, OR ON EDGE: MORE THAN HALF THE DAYS
8. IF YOU CHECKED OFF ANY PROBLEMS, HOW DIFFICULT HAVE THESE MADE IT FOR YOU TO DO YOUR WORK, TAKE CARE OF THINGS AT HOME, OR GET ALONG WITH OTHER PEOPLE?: SOMEWHAT DIFFICULT
IF YOU CHECKED OFF ANY PROBLEMS ON THIS QUESTIONNAIRE, HOW DIFFICULT HAVE THESE PROBLEMS MADE IT FOR YOU TO DO YOUR WORK, TAKE CARE OF THINGS AT HOME, OR GET ALONG WITH OTHER PEOPLE: SOMEWHAT DIFFICULT
7. FEELING AFRAID AS IF SOMETHING AWFUL MIGHT HAPPEN: SEVERAL DAYS
6. BECOMING EASILY ANNOYED OR IRRITABLE: SEVERAL DAYS
3. WORRYING TOO MUCH ABOUT DIFFERENT THINGS: SEVERAL DAYS
GAD7 TOTAL SCORE: 10
2. NOT BEING ABLE TO STOP OR CONTROL WORRYING: SEVERAL DAYS
7. FEELING AFRAID AS IF SOMETHING AWFUL MIGHT HAPPEN: SEVERAL DAYS
GAD7 TOTAL SCORE: 10

## 2025-07-29 ASSESSMENT — PATIENT HEALTH QUESTIONNAIRE - PHQ9
SUM OF ALL RESPONSES TO PHQ QUESTIONS 1-9: 13
SUM OF ALL RESPONSES TO PHQ QUESTIONS 1-9: 13
10. IF YOU CHECKED OFF ANY PROBLEMS, HOW DIFFICULT HAVE THESE PROBLEMS MADE IT FOR YOU TO DO YOUR WORK, TAKE CARE OF THINGS AT HOME, OR GET ALONG WITH OTHER PEOPLE: SOMEWHAT DIFFICULT

## 2025-07-30 PROBLEM — Z63.5: Status: ACTIVE | Noted: 2025-07-30
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
## 2025-08-05 ENCOUNTER — HOSPITAL ENCOUNTER (EMERGENCY)
Facility: CLINIC | Age: 40
Discharge: HOME | End: 2025-08-06
Attending: EMERGENCY MEDICINE
Payer: COMMERCIAL

## 2025-08-05 DIAGNOSIS — G43.009 MIGRAINE WITHOUT AURA AND WITHOUT STATUS MIGRAINOSUS, NOT INTRACTABLE: Primary | ICD-10-CM

## 2025-08-05 PROCEDURE — 99285 EMERGENCY DEPT VISIT HI MDM: CPT | Mod: 25 | Performed by: EMERGENCY MEDICINE

## 2025-08-05 ASSESSMENT — COLUMBIA-SUICIDE SEVERITY RATING SCALE - C-SSRS
2. HAVE YOU ACTUALLY HAD ANY THOUGHTS OF KILLING YOURSELF IN THE PAST MONTH?: NO
1. IN THE PAST MONTH, HAVE YOU WISHED YOU WERE DEAD OR WISHED YOU COULD GO TO SLEEP AND NOT WAKE UP?: NO
6. HAVE YOU EVER DONE ANYTHING, STARTED TO DO ANYTHING, OR PREPARED TO DO ANYTHING TO END YOUR LIFE?: YES

## 2025-08-06 ENCOUNTER — APPOINTMENT (OUTPATIENT)
Dept: CT IMAGING | Facility: CLINIC | Age: 40
End: 2025-08-06
Attending: EMERGENCY MEDICINE
Payer: COMMERCIAL

## 2025-08-06 VITALS
BODY MASS INDEX: 19.53 KG/M2 | DIASTOLIC BLOOD PRESSURE: 78 MMHG | WEIGHT: 124.4 LBS | RESPIRATION RATE: 22 BRPM | HEART RATE: 47 BPM | HEIGHT: 67 IN | TEMPERATURE: 97.2 F | OXYGEN SATURATION: 100 % | SYSTOLIC BLOOD PRESSURE: 114 MMHG

## 2025-08-06 LAB
ALBUMIN UR-MCNC: NEGATIVE MG/DL
ANION GAP SERPL CALCULATED.3IONS-SCNC: 14 MMOL/L (ref 7–15)
APPEARANCE UR: CLEAR
BACTERIA #/AREA URNS HPF: ABNORMAL /HPF
BASOPHILS # BLD AUTO: 0 10E3/UL (ref 0–0.2)
BASOPHILS NFR BLD AUTO: 0 %
BILIRUB UR QL STRIP: NEGATIVE
BUN SERPL-MCNC: 5.8 MG/DL (ref 6–20)
CALCIUM SERPL-MCNC: 10.2 MG/DL (ref 8.8–10.4)
CHLORIDE SERPL-SCNC: 103 MMOL/L (ref 98–107)
COLOR UR AUTO: COLORLESS
CREAT SERPL-MCNC: 0.8 MG/DL (ref 0.51–0.95)
EGFRCR SERPLBLD CKD-EPI 2021: >90 ML/MIN/1.73M2
EOSINOPHIL # BLD AUTO: 0.1 10E3/UL (ref 0–0.7)
EOSINOPHIL NFR BLD AUTO: 1 %
ERYTHROCYTE [DISTWIDTH] IN BLOOD BY AUTOMATED COUNT: 14.2 % (ref 10–15)
GLUCOSE SERPL-MCNC: 112 MG/DL (ref 70–99)
GLUCOSE UR STRIP-MCNC: NEGATIVE MG/DL
HCO3 SERPL-SCNC: 21 MMOL/L (ref 22–29)
HCT VFR BLD AUTO: 40 % (ref 35–47)
HGB BLD-MCNC: 13.4 G/DL (ref 11.7–15.7)
HGB UR QL STRIP: NEGATIVE
IMM GRANULOCYTES # BLD: 0 10E3/UL
IMM GRANULOCYTES NFR BLD: 0 %
INR PPP: 1.02 (ref 0.85–1.15)
KETONES UR STRIP-MCNC: 10 MG/DL
LEUKOCYTE ESTERASE UR QL STRIP: NEGATIVE
LYMPHOCYTES # BLD AUTO: 2.2 10E3/UL (ref 0.8–5.3)
LYMPHOCYTES NFR BLD AUTO: 21 %
MCH RBC QN AUTO: 26.9 PG (ref 26.5–33)
MCHC RBC AUTO-ENTMCNC: 33.5 G/DL (ref 31.5–36.5)
MCV RBC AUTO: 80 FL (ref 78–100)
MONOCYTES # BLD AUTO: 0.5 10E3/UL (ref 0–1.3)
MONOCYTES NFR BLD AUTO: 5 %
MUCOUS THREADS #/AREA URNS LPF: PRESENT /LPF
NEUTROPHILS # BLD AUTO: 7.7 10E3/UL (ref 1.6–8.3)
NEUTROPHILS NFR BLD AUTO: 73 %
NITRATE UR QL: NEGATIVE
NRBC # BLD AUTO: 0 10E3/UL
NRBC BLD AUTO-RTO: 0 /100
PH UR STRIP: 8 [PH] (ref 5–7)
PLATELET # BLD AUTO: 330 10E3/UL (ref 150–450)
POTASSIUM SERPL-SCNC: 3.5 MMOL/L (ref 3.4–5.3)
PROTHROMBIN TIME: 13.6 SECONDS (ref 11.8–14.8)
RBC # BLD AUTO: 4.98 10E6/UL (ref 3.8–5.2)
RBC URINE: <1 /HPF
SODIUM SERPL-SCNC: 138 MMOL/L (ref 135–145)
SP GR UR STRIP: 1 (ref 1–1.03)
UROBILINOGEN UR STRIP-MCNC: NORMAL MG/DL
WBC # BLD AUTO: 10.6 10E3/UL (ref 4–11)
WBC URINE: 0 /HPF

## 2025-08-06 PROCEDURE — 80048 BASIC METABOLIC PNL TOTAL CA: CPT | Performed by: EMERGENCY MEDICINE

## 2025-08-06 PROCEDURE — 250N000011 HC RX IP 250 OP 636: Performed by: EMERGENCY MEDICINE

## 2025-08-06 PROCEDURE — 96361 HYDRATE IV INFUSION ADD-ON: CPT

## 2025-08-06 PROCEDURE — 85025 COMPLETE CBC W/AUTO DIFF WBC: CPT | Performed by: EMERGENCY MEDICINE

## 2025-08-06 PROCEDURE — 85610 PROTHROMBIN TIME: CPT | Performed by: EMERGENCY MEDICINE

## 2025-08-06 PROCEDURE — 70450 CT HEAD/BRAIN W/O DYE: CPT

## 2025-08-06 PROCEDURE — 81001 URINALYSIS AUTO W/SCOPE: CPT | Performed by: EMERGENCY MEDICINE

## 2025-08-06 PROCEDURE — 96374 THER/PROPH/DIAG INJ IV PUSH: CPT

## 2025-08-06 PROCEDURE — 258N000003 HC RX IP 258 OP 636: Performed by: EMERGENCY MEDICINE

## 2025-08-06 PROCEDURE — 36415 COLL VENOUS BLD VENIPUNCTURE: CPT | Performed by: EMERGENCY MEDICINE

## 2025-08-06 PROCEDURE — 96375 TX/PRO/DX INJ NEW DRUG ADDON: CPT

## 2025-08-06 RX ORDER — DIPHENHYDRAMINE HYDROCHLORIDE 50 MG/ML
50 INJECTION, SOLUTION INTRAMUSCULAR; INTRAVENOUS ONCE
Status: COMPLETED | OUTPATIENT
Start: 2025-08-06 | End: 2025-08-06

## 2025-08-06 RX ORDER — KETOROLAC TROMETHAMINE 15 MG/ML
15 INJECTION, SOLUTION INTRAMUSCULAR; INTRAVENOUS ONCE
Status: COMPLETED | OUTPATIENT
Start: 2025-08-06 | End: 2025-08-06

## 2025-08-06 RX ORDER — DEXAMETHASONE SODIUM PHOSPHATE 10 MG/ML
10 INJECTION, SOLUTION INTRAMUSCULAR; INTRAVENOUS ONCE
Status: COMPLETED | OUTPATIENT
Start: 2025-08-06 | End: 2025-08-06

## 2025-08-06 RX ADMIN — DIPHENHYDRAMINE HYDROCHLORIDE 50 MG: 50 INJECTION, SOLUTION INTRAMUSCULAR; INTRAVENOUS at 00:18

## 2025-08-06 RX ADMIN — PROCHLORPERAZINE EDISYLATE 10 MG: 5 INJECTION INTRAMUSCULAR; INTRAVENOUS at 00:18

## 2025-08-06 RX ADMIN — DEXAMETHASONE SODIUM PHOSPHATE 10 MG: 10 INJECTION INTRAMUSCULAR; INTRAVENOUS at 00:19

## 2025-08-06 RX ADMIN — KETOROLAC TROMETHAMINE 15 MG: 15 INJECTION, SOLUTION INTRAMUSCULAR; INTRAVENOUS at 00:18

## 2025-08-06 RX ADMIN — SODIUM CHLORIDE 1000 ML: 0.9 INJECTION, SOLUTION INTRAVENOUS at 00:17

## 2025-08-06 ASSESSMENT — ACTIVITIES OF DAILY LIVING (ADL): ADLS_ACUITY_SCORE: 41

## 2025-08-18 ENCOUNTER — MYC REFILL (OUTPATIENT)
Dept: FAMILY MEDICINE | Facility: CLINIC | Age: 40
End: 2025-08-18
Payer: COMMERCIAL

## 2025-08-18 DIAGNOSIS — F50.811 MODERATE BINGE-EATING DISORDER: ICD-10-CM

## 2025-08-18 RX ORDER — LISDEXAMFETAMINE DIMESYLATE 60 MG/1
60 CAPSULE ORAL EVERY MORNING
Qty: 30 CAPSULE | Refills: 0 | Status: SHIPPED | OUTPATIENT
Start: 2025-08-18